# Patient Record
Sex: FEMALE | Race: WHITE | NOT HISPANIC OR LATINO | Employment: FULL TIME | URBAN - METROPOLITAN AREA
[De-identification: names, ages, dates, MRNs, and addresses within clinical notes are randomized per-mention and may not be internally consistent; named-entity substitution may affect disease eponyms.]

---

## 2017-01-09 ENCOUNTER — ALLSCRIPTS OFFICE VISIT (OUTPATIENT)
Dept: OTHER | Facility: OTHER | Age: 27
End: 2017-01-09

## 2017-02-01 ENCOUNTER — ALLSCRIPTS OFFICE VISIT (OUTPATIENT)
Dept: OTHER | Facility: OTHER | Age: 27
End: 2017-02-01

## 2017-06-28 ENCOUNTER — HOSPITAL ENCOUNTER (EMERGENCY)
Facility: HOSPITAL | Age: 27
Discharge: HOME | End: 2017-06-28
Attending: EMERGENCY MEDICINE | Admitting: EMERGENCY MEDICINE
Payer: COMMERCIAL

## 2017-06-28 VITALS
DIASTOLIC BLOOD PRESSURE: 83 MMHG | BODY MASS INDEX: 24.81 KG/M2 | RESPIRATION RATE: 18 BRPM | OXYGEN SATURATION: 100 % | WEIGHT: 168 LBS | SYSTOLIC BLOOD PRESSURE: 133 MMHG | TEMPERATURE: 97.6 F | HEART RATE: 99 BPM

## 2017-06-28 DIAGNOSIS — M54.9 BACK PAIN: Primary | ICD-10-CM

## 2017-06-28 PROCEDURE — 96372 THER/PROPH/DIAG INJ SC/IM: CPT

## 2017-06-28 PROCEDURE — 99283 EMERGENCY DEPT VISIT LOW MDM: CPT

## 2017-06-28 RX ORDER — DIAZEPAM 5 MG/1
5 TABLET ORAL EVERY 8 HOURS PRN
Qty: 20 TABLET | Refills: 0 | Status: SHIPPED | OUTPATIENT
Start: 2017-06-28 | End: 2018-01-30

## 2017-06-28 RX ORDER — OXYCODONE HYDROCHLORIDE AND ACETAMINOPHEN 5; 325 MG/1; MG/1
1 TABLET ORAL EVERY 6 HOURS PRN
Qty: 28 TABLET | Refills: 0 | Status: SHIPPED | OUTPATIENT
Start: 2017-06-28 | End: 2017-07-08

## 2017-06-28 RX ORDER — METHYLPREDNISOLONE 4 MG/1
TABLET ORAL
Qty: 21 TABLET | Refills: 0 | Status: SHIPPED | OUTPATIENT
Start: 2017-06-28 | End: 2018-01-30

## 2017-06-28 RX ORDER — DEXAMETHASONE SODIUM PHOSPHATE 4 MG/ML
4 INJECTION, SOLUTION INTRA-ARTICULAR; INTRALESIONAL; INTRAMUSCULAR; INTRAVENOUS; SOFT TISSUE ONCE
Status: COMPLETED | OUTPATIENT
Start: 2017-06-28 | End: 2017-06-28

## 2017-06-28 RX ORDER — DIAZEPAM 5 MG/ML
5 INJECTION, SOLUTION INTRAMUSCULAR; INTRAVENOUS ONCE
Status: COMPLETED | OUTPATIENT
Start: 2017-06-28 | End: 2017-06-28

## 2017-06-28 RX ADMIN — DEXAMETHASONE SODIUM PHOSPHATE 4 MG: 4 INJECTION, SOLUTION INTRAMUSCULAR; INTRAVENOUS at 14:53

## 2017-06-28 RX ADMIN — HYDROMORPHONE HYDROCHLORIDE 1 MG: 1 INJECTION, SOLUTION INTRAMUSCULAR; INTRAVENOUS; SUBCUTANEOUS at 14:54

## 2017-06-28 RX ADMIN — DIAZEPAM 5 MG: 5 INJECTION, SOLUTION INTRAMUSCULAR; INTRAVENOUS at 15:00

## 2017-07-25 ENCOUNTER — TRANSCRIBE ORDERS (OUTPATIENT)
Dept: ADMINISTRATIVE | Facility: HOSPITAL | Age: 27
End: 2017-07-25

## 2017-07-25 ENCOUNTER — ALLSCRIPTS OFFICE VISIT (OUTPATIENT)
Dept: OTHER | Facility: OTHER | Age: 27
End: 2017-07-25

## 2017-07-25 DIAGNOSIS — M54.16 LUMBAR RADICULOPATHY: Primary | ICD-10-CM

## 2017-07-25 DIAGNOSIS — Z98.890 OTHER SPECIFIED POSTPROCEDURAL STATES: ICD-10-CM

## 2017-07-25 DIAGNOSIS — M54.41 LOW BACK PAIN WITH RIGHT-SIDED SCIATICA: ICD-10-CM

## 2017-07-25 DIAGNOSIS — M54.16 RADICULOPATHY OF LUMBAR REGION: ICD-10-CM

## 2017-08-08 ENCOUNTER — HOSPITAL ENCOUNTER (OUTPATIENT)
Dept: MRI IMAGING | Facility: HOSPITAL | Age: 27
Discharge: HOME | End: 2017-08-08
Payer: COMMERCIAL

## 2017-08-08 DIAGNOSIS — M54.16 LUMBAR RADICULOPATHY: ICD-10-CM

## 2017-08-08 PROCEDURE — A9585 GADOBUTROL INJECTION: HCPCS | Performed by: RADIOLOGY

## 2017-08-08 PROCEDURE — 72158 MRI LUMBAR SPINE W/O & W/DYE: CPT

## 2017-08-08 RX ADMIN — GADOBUTROL 7 ML: 604.72 INJECTION INTRAVENOUS at 21:07

## 2017-08-25 ENCOUNTER — ALLSCRIPTS OFFICE VISIT (OUTPATIENT)
Dept: OTHER | Facility: OTHER | Age: 27
End: 2017-08-25

## 2017-08-25 DIAGNOSIS — M54.16 RADICULOPATHY OF LUMBAR REGION: ICD-10-CM

## 2017-08-25 DIAGNOSIS — Z00.00 ENCOUNTER FOR GENERAL ADULT MEDICAL EXAMINATION WITHOUT ABNORMAL FINDINGS: ICD-10-CM

## 2017-09-06 ENCOUNTER — HOSPITAL ENCOUNTER (OUTPATIENT)
Dept: RADIOLOGY | Facility: HOSPITAL | Age: 27
Discharge: HOME | End: 2017-09-06
Attending: RADIOLOGY | Admitting: RADIOLOGY
Payer: COMMERCIAL

## 2017-09-06 VITALS — SYSTOLIC BLOOD PRESSURE: 124 MMHG | OXYGEN SATURATION: 100 % | DIASTOLIC BLOOD PRESSURE: 82 MMHG | HEART RATE: 80 BPM

## 2017-09-06 DIAGNOSIS — M54.16 LUMBAR RADICULOPATHY: ICD-10-CM

## 2017-09-06 PROCEDURE — 64483 NJX AA&/STRD TFRM EPI L/S 1: CPT

## 2017-09-06 RX ORDER — METHYLPREDNISOLONE ACETATE 80 MG/ML
80 INJECTION, SUSPENSION INTRA-ARTICULAR; INTRALESIONAL; INTRAMUSCULAR; SOFT TISSUE ONCE
Status: COMPLETED | OUTPATIENT
Start: 2017-09-06 | End: 2017-09-06

## 2017-09-06 RX ORDER — BUPIVACAINE HYDROCHLORIDE 2.5 MG/ML
20 INJECTION, SOLUTION EPIDURAL; INFILTRATION; INTRACAUDAL ONCE
Status: COMPLETED | OUTPATIENT
Start: 2017-09-06 | End: 2017-09-06

## 2017-09-06 RX ADMIN — METHYLPREDNISOLONE ACETATE 80 MG: 80 INJECTION, SUSPENSION INTRA-ARTICULAR; INTRALESIONAL; INTRAMUSCULAR; SOFT TISSUE at 13:26

## 2017-09-06 RX ADMIN — IOHEXOL 4 ML: 300 INJECTION, SOLUTION INTRAVENOUS at 13:30

## 2017-09-06 RX ADMIN — BUPIVACAINE HYDROCHLORIDE 1 ML: 2.5 INJECTION, SOLUTION EPIDURAL; INFILTRATION; INTRACAUDAL at 13:28

## 2017-09-07 ENCOUNTER — GENERIC CONVERSION - ENCOUNTER (OUTPATIENT)
Dept: OTHER | Facility: OTHER | Age: 27
End: 2017-09-07

## 2018-01-12 ENCOUNTER — GENERIC CONVERSION - ENCOUNTER (OUTPATIENT)
Dept: OTHER | Facility: OTHER | Age: 28
End: 2018-01-12

## 2018-01-12 ENCOUNTER — ALLSCRIPTS OFFICE VISIT (OUTPATIENT)
Dept: OTHER | Facility: OTHER | Age: 28
End: 2018-01-12

## 2018-01-12 VITALS
SYSTOLIC BLOOD PRESSURE: 116 MMHG | DIASTOLIC BLOOD PRESSURE: 70 MMHG | WEIGHT: 170 LBS | BODY MASS INDEX: 25.18 KG/M2 | HEIGHT: 69 IN

## 2018-01-12 VITALS
DIASTOLIC BLOOD PRESSURE: 85 MMHG | WEIGHT: 176 LBS | HEART RATE: 105 BPM | TEMPERATURE: 99 F | RESPIRATION RATE: 16 BRPM | SYSTOLIC BLOOD PRESSURE: 152 MMHG | HEIGHT: 69 IN | BODY MASS INDEX: 26.07 KG/M2

## 2018-01-12 DIAGNOSIS — R53.83 OTHER FATIGUE: ICD-10-CM

## 2018-01-12 DIAGNOSIS — M25.549 ARTHRALGIA OF HAND: ICD-10-CM

## 2018-01-12 NOTE — RESULT NOTES
Verified Results  IR OTHER [id number] 11:55AM [facility]     Test Name Result Flag Reference   IR OTHER (Report)     Procedure: Right L5-S1 transforaminal epidural steroid injection  OPERATORS: Ania  Medications:   2 0 ml Omnipaque 300   1 0 mL 0 25% Bupivicaine    1 mL depo-medrol 80 mg/mL     Fluoroscopy time: 3 2 minutes     Complications: None  Indication: Right lower extremity radiculopathy  Procedure: The site was marked by the attending physician  Next, after standard time out procedure, the skin overlying the target was prepped and draped in the usual sterile technique  Using a 3 5-in 22-gauge needle, with fluoroscopic guidance, the right L5-S1    neural foramen was targeted and advanced to the appropriate landmarks  Contrast was injected under subtraction fluoroscopy  There was contrast opacification of the nerve root sleeve and epidural space  No vascular enhancement demonstrated  1 ml of    bupivacaine was injected through the needle, followed by 1 0 ml of depo-medrol  The needle was removed  The patient tolerated the procedure well without complications  IMPRESSION:   Impression:    Successful right L5-S1 transforaminal epidural steroid injection         Workstation performed: [id number]     Signed by:   Khadra Diaz MD   9/6/17

## 2018-01-13 NOTE — CONSULTS
Assessment    1  Chronic right-sided low back pain with right-sided sciatica (724 2,724 3,338 29)   (M54 41,G89 29)   2  Lumbar radicular pain (724 4) (M54 16)   3  History of lumbar surgery (V15 29) (Z98 890)    Plan   Chronic right-sided low back pain with right-sided sciatica, History of lumbar surgery,  Lumbar radicular pain    · Follow Up After Tests Complete Evaluation and Treatment  Follow-up, after MRI lumbar  spine with and without, Dr Lilliam Flood, [facility])  Status: Hold For - Scheduling   Requested for: [id number]   Ordered; For: Chronic right-sided low back pain with right-sided sciatica, History of lumbar surgery, Lumbar radicular pain; Ordered By: Roman Eugene Performed:  Due: 03BQH1360   · * MRI LUMBAR SPINE W WO CONTRAST; Status:Need Information - Financial  Authorization; Requested for:[id number];    Perform:[facility] Radiology; Due:85Ykv5078; Last Updated By:Delmy Michaels; 7/25/2017 10:11:01 AM;Ordered; For:Chronic right-sided low back pain with right-sided sciatica, History of lumbar surgery, Lumbar radicular pain; Ordered By:Billy Cabrera;    1 Amina Bahena MD, Erik CAMPOS (Pain Management) Co-Management  Consult and treat  Status: Active  Requested for: [id number]  Ordered; For: Chronic right-sided low back pain with right-sided sciatica, History of lumbar surgery, Lumbar radicular pain;  Ordered By: Roman Eugene  Performed:   Due: 98NPU1527     Discussion/Summary    Very pleasant 29-year-old female, accompanied by her mother, presents for evaluation of right low back, right leg, and right foot pain  There is no recent imaging for review  Patient has had a recent approximate 3 month course of chiropractic care with Dr Jimmye Merlin in the [facility] area with no significant improvement  MRI lumbar spine with and without contrast is requested to assess for the etiology of her chronic pain, and clinical follow-up  Consultation with pain management is also requested with Dr Tolu Contreras also understands should she have significant increase/persistent pain, difficulty with gait or balance, motor or sensory difficulties in the lower extremities, or bowel or bladder incontinence recent call and return sooner for reassessment  These findings, impressions and recommendations are reviewed in great detail with the patient, she and her mother, expressed understanding and agreement, their questions were answered completely and to their satisfaction  Follow up has been scheduled  The patient has the current Goals: Improvement/resolution chronic right low back, let right leg, and foot pain  Patient is able to Self-Care  Chief Complaint    1  Back Pain  Initial consultation, referral by ER, right low back pain, right leg pain, right foot pain      History of Present Illness  Very pleasant 51-year-old female, accompanied by her mother, presents as noted above  She reports a history of sudden onset of right low back, right leg, in radiation to the right foot pain on 6/28/17, while playing golf, she reported she had ball out of a bunker and had sudden onset of this pain  She was seen at [place] [address] emergency department 7/28/17, treated with a dexamethasone injection, [place] injection, and diazepam  She was discharged with methylprednisolone pack (Medrol Dosepak), diazepam as well as Percocet  She has history of surgery for a "disc herniation" in [place] in 2012, this was while she was a student at the [employer], [place], [place], and was also associated with a golfing injury  (records have been requested from the [employer] athletic department)    She reports following the surgery she had a 4-6 month physical therapy rehabilitation program prior to returning to golf  She reports she continues to be occasionally symptomatic since the event, particularly with chronic right sciatic, associated with twisting while golfing      She reports she did see her chiropractor in the [facility] area Dr Td Sanchez and receive therapy for 4-6 weeks prior to this injury in and attempt to settle things down with her low back, with no significant improvement  Celester Letha presents with complaints of sudden onset of intermittent episodes of severe bilateral lower back pain, radiating to the right buttock, right thigh, right lower leg and right foot  On a scale of 1 to 10, the patient rates the pain as 9  The symptoms resulted from a direct blow  Symptoms are improved by rest and NSAIDs, but not by ice and heat Symptoms are made worse by standing and twisting (plays golf in college, noticed when playing golf, 2011, after an impact in a golf swing, hasn't been golfing in past month)   Associated symptoms include spasm, stiffness, general malaise and leg numbness, but no fever, no night sweats, no abdominal pain, no weight loss, no arm numbness, no arm weakness, no leg weakness, no urinary incontinence, no fecal incontinence, no urinary retention and no rash localized to the area of pain  Review of Systems    Constitutional: feeling poorly and feeling tired  Eyes: No complaints of eye pain, no red eyes, no eyesight problems, no discharge, no dry eyes, no itching of eyes  ENT: no complaints of earache, no loss of hearing, no nose bleeds, no nasal discharge, no sore throat, no hoarseness  Cardiovascular: No complaints of slow heart rate, no fast heart rate, no chest pain, no palpitations, no leg claudication, no lower extremity edema  Respiratory: No complaints of shortness of breath, no wheezing, no cough, no SOB on exertion, no orthopnea, no PND  Gastrointestinal: No complaints of abdominal pain, no constipation, no nausea or vomiting, no diarrhea, no bloody stools  Genitourinary: No complaints of dysuria, no incontinence, no pelvic pain, no dysmenorrhea, no vaginal discharge or bleeding     Musculoskeletal: No complaints of arthralgias, no myalgias, no joint swelling or stiffness, no limb pain or swelling  Integumentary: No complaints of skin rash or lesions, no itching, no skin wounds, no breast pain or lump  Neurological: numbness, tingling, difficulty walking and tingling just back side of right leg  Psychiatric: depression and back pain keeps from doing normal activities, but no sleep disturbances  Endocrine: No complaints of proptosis, no hot flashes, no muscle weakness, no deepening of the voice, no feelings of weakness  Hematologic/Lymphatic: No complaints of swollen glands, no swollen glands in the neck, does not bleed easily, does not bruise easily  ROS reviewed  Active Problems    1  Abdominal discomfort, bilateral lower quadrant (789 03,789 04) (R10 31,R10 32)   2  Anxiety (300 00) (F41 9)   3  Arthralgia of hand, unspecified laterality (719 44) (M25 549)   4  Celiac disease (579 0) (K90 0)   5  Change in bowel habits (787 99) (R19 4)   6  Cold intolerance (780 99) (R68 89)   7  Constipation, unspecified constipation type (564 00) (K59 00)   8  Depression (311) (F32 9)   9  Fatigue (780 79) (R53 83)   10  OCD (obsessive compulsive disorder) (300 3) (F42 9)   11  Other constipation (564 09) (K59 09)   12  Other fatigue (780 79) (R53 83)   13  Weight gain (783 1) (R63 5)    Past Medical History    1  History of Abdominal bloating (787 3) (R14 0)   2  History of Abdominal discomfort, bilateral lower quadrant (789 03,789 04)   (R10 31,R10 32)   3  History of Abnormal weight gain (783 1) (R63 5)   4  History of Acute frontal sinusitis (461 1) (J01 10)   5  History of Anxiety (300 00) (F41 9)   6  History of acute pharyngitis (V12 69) (Z87 09)    The active problems and past medical history were reviewed and updated today  Surgical History    1  History of Back Surgery    The surgical history was reviewed and updated today  Family History  Father    1  Family history of diabetes mellitus (V18 0) (Z83 3)   2   Family history of hypertension (V17 49) (Z82 49)    The family history was reviewed and updated today  Social History    · Never a smoker   · Occasional alcohol use  The social history was reviewed and updated today  Current Meds   1  DULoxetine HCl - 60 MG Oral Capsule Delayed Release Particles; take 1 capsule daily; Therapy: [id number] to (Lucía Atkinson)  Requested for: 85Ulh2804; Last   Rx:[id number] Ordered   2  Seasonique 0 15-0 03 &0 01 MG Oral Tablet; Therapy: (Recorded:16Tgv2226) to Recorded    The medication list was reviewed and updated today  Allergies    1  Amoxicillin TABS    2  Gluten    Vitals  Vital Signs    Recorded: 25Jul2017 09:25AM   Temperature 98 3 F, Tympanic   Heart Rate 96, L Radial   Respiration 16   Systolic 281, LUE, Sitting   Diastolic 80, LUE, Sitting   Height 5 ft 8 5 in   Weight 168 lb 9 6 oz   BMI Calculated 25 26   BSA Calculated 1 91   Pain Scale 0     Physical Exam     Constitutional Patient appears healthy and well developed  No signs of acute distress present  appears healthy, comfortable, within normal limits of ideal weight and appearance reflects stated age  Respiratory Respiratory effort: Normal   Auscultation of lungs: Clear to auscultation bilaterally  Cardiovascular Auscultation of heart: Rate is regular  Rhythm is regular  No heart murmur appreciated  Musculo: Spine   Spine:    Lumbar/Sacral Spine examination demonstrates Lumbosacral Spine: Appearance: Normal  Tenderness: right sciatic notch, but not the lumbar spine, not the left paraspinal and not the right paraspinal  Palpatory Findings include no bilateral muscle spasms  ROM: Full  Motor: Normal    Motor Exam: all motor groups within normal limits of strength & tone bilaterally  Sensory Exam: all sensory within normal limits bilaterally  Deep Tendon Reflexes: all reflexes within normal limits bilaterally  Neurologic - Mental Status: Alert and Oriented x3    Mood and affect: Affect is normal  Grossly nonfocal    Motor System General Motor Strength: No pronator drift and no parietal drift  Motor System - Upper Extremities: Normal to inspection and palpation  Strength: Deltoids 5/5 bilaterally  Biceps 5/5 bilaterally  Triceps 5/5 bilaterally  Extensor carpi radials is 5/5 bilaterally  Extensor digitorum 5/5 bilaterally  Intrinsic 5/5 bilaterally   5/5 bilaterally  Muscle tone: Normal bilaterally  Muscle Bulk: Normal bilaterally  Motor System - Lower Extremities: Normal to inspection and palpation  Strength: iliopsoas 5/5 bilaterally  Quadriceps 5/5 bilaterally  Hamstrings 5/5 bilaterally  Gastrocnemius 5/5 bilaterally  Muscle tone: Normal bilaterally  Muscle Bulk: Normal bilaterally  Reflexes: Biceps reflexes are 2+ bilaterally  Triceps reflexes are 2+ bilaterally  Achilles reflexes are 2+ bilaterally  Babinski's reflex is 2+ down going bilaterally  Ankle clonus is absent bilaterally  Coordination: Finger to nose was normal    Sensory: Sensation grossly intact to light touch  Sensation grossly intact to light touch  Gait and Station: Jennings with a normal gait  Future Appointments    Date/Time Provider Specialty Site   08/15/2017 08:30 AM Juventino Cabrera, [facility] Neurosurgery [facility] NEUR[facility]   08/15/2017 08:45 AM MARY JANE Sosa   Neurosurgery [facility]     Signatures   Electronically signed by : Kam Hobbs [facility]; Jul 25 2017 10:54AM EST                       (Author)    Electronically signed by : MARY JANE Martin ; Jul 25 2017  5:00PM EST

## 2018-01-13 NOTE — RESULT NOTES
Verified Results  (Q) TSH, 3RD GENERATION [id number] 07:38AM Deshawn Contreras     Test Name Result Flag Reference   TSH 1 41 mIU/L     Reference Range                         > or = 20 Years  0 40-4 50                              Pregnancy Ranges            First trimester    0 26-2 66            Second trimester   0 55-2 73            Third trimester    0 43-2 91     (Q) IDA BARR VIRUS ANTIBODY PANEL [id number] 07:38AM Deshawn Contreras     Test Name Result Flag Reference   IDA BAEZA VIRUS VCA$ANTIBODY (IGM) < OR = 0 90     Value         Interpretation                             -----         --------------                             < or = 0 90   Negative                             0  91-1 09     Equivocal                             > or = 1 10   Positive   IDA BAEZA VIRUS VCA$ANTIBODY (IGG) 3 20 H    Value         Interpretation                             -----         --------------                             < or = 0 90   Negative                             0  91-1 09     Equivocal                             > or = 1 10   Positive   IDA BARR VIRUS (EBNA)$ANTIBODY (IGG) 3 78 H    Value         Interpretation                             -----         --------------                             < or = 0 90   Negative                             0  91-1 09     Equivocal                             > or = 1 10   Positive   INTERPRETATION:      Suggestive of a past Ida-Barr virus infection  In infants, a similar pattern may occur as a result  of passive maternal transfer of antibody       (Q) LYME DISEASE ANTIBODIES (IGG, IGM) WESTERN BLOT [id number] 07:38AM Deshawn Contreras     Test Name Result Flag Reference   LYME DISEASE AB (IGG) WB NEGATIVE  NEGATIVE   18 KD (IGG) BAND NON-REACTIVE     23 KD (IGG) BAND NON-REACTIVE     28 KD (IGG) BAND NON-REACTIVE     30 KD (IGG) BAND NON-REACTIVE     39 KD (IGG) BAND NON-REACTIVE     23 KD (IGM) BAND NON-REACTIVE     39 KD (IGM) BAND NON-REACTIVE     41 KD (IGM) BAND NON-REACTIVE     As per CDC criteria, a Lyme disease IgG Immunoblot must  show reactivity to at least 5 of 10 specific borrelial  proteins to be considered positive; similarly, a   positive Lyme disease IgM immunoblot requires  reactivity to 2 of 3 specific borrelial proteins  Although considered negative, IgG reactivity to fewer  specific borrelial proteins or IgM reactivity to only  1 protein may indicate recent B  burgdorferi infection  and warrant testing of a later sample  A positive IgM  but negative IgG result obtained more than a month  after onset of symptoms likely represents a false-  positive IgM result rather than acute Lyme disease  In rare instances, Lyme disease immunoblot reactivity  may represent antibodies induced by exposure to other  spirochetes  41 KD (IGG) BAND REACTIVE A    45 KD (IGG) BAND NON-REACTIVE     58 KD (IGG) BAND REACTIVE A    66 KD (IGG) BAND NON-REACTIVE     93 KD (IGG) BAND NON-REACTIVE     LYME DISEASE AB (IGM) WB NEGATIVE  NEGATIVE     *(Q) VITAMIN D, 25-HYDROXY, LC/MS/MS [id number] 07:38AM CordManfred mora   REPORT COMMENT:  FASTING:YES     Test Name Result Flag Reference   VITAMIN D, 25-OH, TOTAL 27 ng/mL L    Vitamin D Status         25-OH Vitamin D:     Deficiency:                    <20 ng/mL  Insufficiency:             20 - 29 ng/mL  Optimal:                 > or = 30 ng/mL     For 25-OH Vitamin D testing on patients on   D2-supplementation and patients for whom quantitation   of D2 and D3 fractions is required, the QuestAssureD(TM)  25-OH VIT D, (D2,D3), LC/MS/MS is recommended: order   code 53506 (patients >2yrs)  For more information on this test, go to:  http://Planana/faq/TWL700  (This link is being provided for   informational/educational purposes only )     (Q) VITAMIN B12/FOLATE, SERUM PANEL [id number] 07:38AM Cordisco, [place]     Test Name Result Flag Reference   VITAMIN B12 359 pg/mL  200-1100   Please Note: Although the reference range for vitamin  B12 is 200-1100 pg/mL, it has been reported that between  5 and 10% of patients with values between 200 and 400  pg/mL may experience neuropsychiatric and hematologic  abnormalities due to occult B12 deficiency; less than 1%  of patients with values above 400 pg/mL will have symptoms  FOLATE, SERUM 14 7 ng/mL     Reference Range                             Low:           <3 4                             Borderline:    3 4-5 4                             Normal:        >5 4     (1) COMPREHENSIVE METABOLIC PANEL [id number] 33:31NW [facility]     Test Name Result Flag Reference   GLUCOSE 89 mg/dL  65-99   Fasting reference interval   UREA NITROGEN (BUN) 15 mg/dL  7-25   CREATININE 0 78 mg/dL  0 50-1 10   eGFR NON-AFR   AMERICAN 106 mL/min/1 73m2  > OR = 60   eGFR AFRICAN AMERICAN 123 mL/min/1 73m2  > OR = 60   BUN/CREATININE RATIO   8-52   NOT APPLICABLE (calc)   ALT 11 U/L  6-29   ALBUMIN 4 6 g/dL  3 6-5 1   GLOBULIN 2 3 g/dL (calc)  1 9-3 7   ALBUMIN/GLOBULIN RATIO 2 0 (calc)  1 0-2 5   BILIRUBIN, TOTAL 0 3 mg/dL  0 2-1 2   ALKALINE PHOSPHATASE 37 U/L     AST 15 U/L  10-30   SODIUM 141 mmol/L  135-146   POTASSIUM 4 8 mmol/L  3 5-5 3   CHLORIDE 108 mmol/L     CARBON DIOXIDE 23 mmol/L  20-31   CALCIUM 9 5 mg/dL  8 6-10 2   PROTEIN, TOTAL 6 9 g/dL  6 1-8 1     (1) HEPATIC FUNCTION PANEL [id number] 07:38AM [facility]     Test Name Result Flag Reference   PROTEIN, TOTAL 6 9 g/dL  6 1-8 1   ALBUMIN 4 6 g/dL  3 6-5 1   GLOBULIN 2 3 g/dL (calc)  1 9-3 7   ALBUMIN/GLOBULIN RATIO 2 0 (calc)  1 0-2 5   BILIRUBIN, TOTAL 0 3 mg/dL  0 2-1 2   BILIRUBIN, DIRECT 0 1 mg/dL  < OR = 0 2   BILIRUBIN, INDIRECT 0 2 mg/dL (calc)  0 2-1 2   ALKALINE PHOSPHATASE 37 U/L     AST 15 U/L  10-30   ALT 11 U/L  6-29     (1) CBC/PLT/DIFF [id number] 07:38AM [facility]     Test Name Result Flag Reference   WHITE BLOOD CELL COUNT 4 6 Thousand/uL  3 8-10 8   RED BLOOD CELL COUNT 4 34 Million/uL 3  80-5 10   HEMOGLOBIN 13 6 g/dL  11 7-15 5   HEMATOCRIT 41 3 %  35 0-45 0   MCV 95 2 fL  80 0-100 0   MCH 31 4 pg  27 0-33 0   EOSINOPHILS 1 7 %     BASOPHILS 0 6 %     ABSOLUTE MONOCYTES 267 cells/uL  200-950   ABSOLUTE EOSINOPHILS 78 cells/uL     ABSOLUTE BASOPHILS 28 cells/uL  0-200   NEUTROPHILS 55 0 %     LYMPHOCYTES 36 9 %     MONOCYTES 5 8 %     MCHC 33 0 g/dL  32 0-36 0   RDW 12 9 %  11 0-15 0   PLATELET COUNT 650 Thousand/uL  140-400   MPV 9 2 fL  7 5-11 5   ABSOLUTE NEUTROPHILS 2530 cells/uL  9353-0976   ABSOLUTE LYMPHOCYTES 1697 cells/uL  850-3900     (1) OP RICHARD FERRITIN [id number] 07:38AM Cordisco, Lethea Herter     Test Name Result Flag Reference   FERRITIN 19 ng/mL

## 2018-01-14 VITALS
DIASTOLIC BLOOD PRESSURE: 80 MMHG | SYSTOLIC BLOOD PRESSURE: 122 MMHG | HEART RATE: 96 BPM | RESPIRATION RATE: 16 BRPM | WEIGHT: 168.6 LBS | HEIGHT: 69 IN | BODY MASS INDEX: 24.97 KG/M2 | TEMPERATURE: 98.3 F

## 2018-01-14 VITALS
WEIGHT: 171.25 LBS | SYSTOLIC BLOOD PRESSURE: 132 MMHG | OXYGEN SATURATION: 99 % | HEIGHT: 69 IN | DIASTOLIC BLOOD PRESSURE: 74 MMHG | HEART RATE: 84 BPM | RESPIRATION RATE: 16 BRPM | TEMPERATURE: 97.7 F | BODY MASS INDEX: 25.36 KG/M2

## 2018-01-16 ENCOUNTER — LAB CONVERSION - ENCOUNTER (OUTPATIENT)
Dept: OTHER | Facility: OTHER | Age: 28
End: 2018-01-16

## 2018-01-16 LAB
A/G RATIO (HISTORICAL): 2.2 (CALC) (ref 1–2.5)
ALBUMIN SERPL BCP-MCNC: 5 G/DL (ref 3.6–5.1)
ALP SERPL-CCNC: 39 U/L (ref 33–115)
ALT SERPL W P-5'-P-CCNC: 14 U/L (ref 6–29)
ANTI DNA DOUBLE STRANDED (HISTORICAL): <1 IU/ML
ANTI-NUCLEAR ANTIBODY (ANA) (HISTORICAL): NEGATIVE
AST SERPL W P-5'-P-CCNC: 19 U/L (ref 10–30)
BASOPHILS # BLD AUTO: 1.2 %
BASOPHILS # BLD AUTO: 52 CELLS/UL (ref 0–200)
BILIRUB SERPL-MCNC: 0.5 MG/DL (ref 0.2–1.2)
BUN SERPL-MCNC: 14 MG/DL (ref 7–25)
BUN/CREA RATIO (HISTORICAL): NORMAL (CALC) (ref 6–22)
CALCIUM SERPL-MCNC: 9.9 MG/DL (ref 8.6–10.2)
CHLORIDE SERPL-SCNC: 104 MMOL/L (ref 98–110)
CHOLEST SERPL-MCNC: 175 MG/DL
CHOLEST/HDLC SERPL: 3.2 (CALC)
CO2 SERPL-SCNC: 26 MMOL/L (ref 20–31)
CREAT SERPL-MCNC: 0.93 MG/DL (ref 0.5–1.1)
DEPRECATED RDW RBC AUTO: 11.9 % (ref 11–15)
EGFR AFRICAN AMERICAN (HISTORICAL): 99 ML/MIN/1.73M2
EGFR-AMERICAN CALC (HISTORICAL): 85 ML/MIN/1.73M2
EOSINOPHIL # BLD AUTO: 1.4 %
EOSINOPHIL # BLD AUTO: 60 CELLS/UL (ref 15–500)
ERYTHROCYTE SEDIMENTATION RATE (HISTORICAL): 6 MM/H
GAMMA GLOBULIN (HISTORICAL): 2.3 G/DL (CALC) (ref 1.9–3.7)
GLUCOSE (HISTORICAL): 87 MG/DL (ref 65–99)
HCT VFR BLD AUTO: 40.9 % (ref 35–45)
HDLC SERPL-MCNC: 54 MG/DL
HGB BLD-MCNC: 13.8 G/DL (ref 11.7–15.5)
LDL CHOLESTEROL (HISTORICAL): 102 MG/DL (CALC)
LYMPHOCYTES # BLD AUTO: 1630 CELLS/UL (ref 850–3900)
LYMPHOCYTES # BLD AUTO: 37.9 %
MCH RBC QN AUTO: 31.6 PG (ref 27–33)
MCHC RBC AUTO-ENTMCNC: 33.7 G/DL (ref 32–36)
MCV RBC AUTO: 93.6 FL (ref 80–100)
MONOCYTES # BLD AUTO: 314 CELLS/UL (ref 200–950)
MONOCYTES (HISTORICAL): 7.3 %
NEUTROPHILS # BLD AUTO: 2245 CELLS/UL (ref 1500–7800)
NEUTROPHILS # BLD AUTO: 52.2 %
NON-HDL-CHOL (CHOL-HDL) (HISTORICAL): 121 MG/DL (CALC)
PLATELET # BLD AUTO: 350 THOUSAND/UL (ref 140–400)
PMV BLD AUTO: 10.6 FL (ref 7.5–12.5)
POTASSIUM SERPL-SCNC: 4.4 MMOL/L (ref 3.5–5.3)
RBC # BLD AUTO: 4.37 MILLION/UL (ref 3.8–5.1)
RHEUMATOID FACTOR (HISTORICAL): <14 IU/ML
SODIUM SERPL-SCNC: 139 MMOL/L (ref 135–146)
T4 FREE SERPL-MCNC: 1 NG/DL (ref 0.8–1.8)
TOTAL PROTEIN (HISTORICAL): 7.3 G/DL (ref 6.1–8.1)
TRIGL SERPL-MCNC: 98 MG/DL
TSH SERPL DL<=0.05 MIU/L-ACNC: 1.09 MIU/L
WBC # BLD AUTO: 4.3 THOUSAND/UL (ref 3.8–10.8)

## 2018-01-16 NOTE — RESULT NOTES
Verified Results  (1) CBC/PLT/DIFF [id number] 08:30AM Molly Contreras     Test Name Result Flag Reference   WHITE BLOOD CELL COUNT 4 8 Thousand/uL  3 8-10 8   RED BLOOD CELL COUNT 4 09 Million/uL  3 80-5 10   HEMOGLOBIN 12 7 g/dL  11 7-15 5   HEMATOCRIT 39 0 %  35 0-45 0   MCV 95 2 fL  80 0-100 0   MCH 31 1 pg  27 0-33 0   MCHC 32 7 g/dL  32 0-36 0   RDW 13 2 %  11 0-15 0   PLATELET COUNT 875 Thousand/uL  140-400   MPV 9 3 fL  7 5-11 5   ABSOLUTE NEUTROPHILS 2952 cells/uL  6804-7307   ABSOLUTE LYMPHOCYTES 1450 cells/uL  850-3900   ABSOLUTE MONOCYTES 307 cells/uL  200-950   ABSOLUTE EOSINOPHILS 77 cells/uL     ABSOLUTE BASOPHILS 14 cells/uL  0-200   NEUTROPHILS 61 5 %     LYMPHOCYTES 30 2 %     MONOCYTES 6 4 %     EOSINOPHILS 1 6 %     BASOPHILS 0 3 %     WE RECEIVED YOUR HANDWRITTEN TEST ORDER AND  PERFORMED A CBC (INCLUDES HEMOGRAM, PLATELET  AND DIFFERENTIAL)  IF THIS IS NOT WHAT YOU  INTENDED TO ORDER, PLEASE CONTACT YOUR LOCAL   IMMEDIATELY SO   THAT WE CAN ADJUST OUR BILLING APPROPRIATELY  YOU MAY ALSO INQUIRE ABOUT ALTERNATIVE OR   ADDITIONAL TESTING  (1) COMPREHENSIVE METABOLIC PANEL [id number] 80:66BA Molly Contreras     Test Name Result Flag Reference   GLUCOSE 84 mg/dL  65-99   Fasting reference interval   UREA NITROGEN (BUN) 8 mg/dL  7-25   CREATININE 0 85 mg/dL  0 50-1 10   eGFR NON-AFR   AMERICAN 96 mL/min/1 73m2  > OR = 60   eGFR AFRICAN AMERICAN 111 mL/min/1 73m2  > OR = 60   BUN/CREATININE RATIO   4-97   NOT APPLICABLE (calc)   SODIUM 139 mmol/L  135-146   POTASSIUM 4 2 mmol/L  3 5-5 3   CHLORIDE 106 mmol/L     CARBON DIOXIDE 23 mmol/L  19-30   CALCIUM 9 1 mg/dL  8 6-10 2   PROTEIN, TOTAL 6 7 g/dL  6 1-8 1   ALBUMIN 4 4 g/dL  3 6-5 1   GLOBULIN 2 3 g/dL (calc)  1 9-3 7   ALBUMIN/GLOBULIN RATIO 1 9 (calc)  1 0-2 5   BILIRUBIN, TOTAL 0 4 mg/dL  0 2-1 2   ALKALINE PHOSPHATASE 33 U/L     AST 16 U/L  10-30   ALT 13 U/L  6-29     (Q) TSH, 3RD GENERATION [id number] 08:30AM Alma Rosa Contreras   REPORT COMMENT:  FASTING:YES     Test Name Result Flag Reference   TSH 0 59 mIU/L     Reference Range                         > or = 20 Years  0 40-4 50                              Pregnancy Ranges            First trimester    0 26-2 66            Second trimester   0 55-2 73            Third trimester    0 43-2 91

## 2018-01-24 VITALS
OXYGEN SATURATION: 98 % | HEART RATE: 90 BPM | DIASTOLIC BLOOD PRESSURE: 90 MMHG | SYSTOLIC BLOOD PRESSURE: 124 MMHG | WEIGHT: 176 LBS | BODY MASS INDEX: 26.07 KG/M2 | HEIGHT: 69 IN | RESPIRATION RATE: 16 BRPM | TEMPERATURE: 98.1 F

## 2018-01-29 ENCOUNTER — TELEPHONE (OUTPATIENT)
Dept: GASTROENTEROLOGY | Facility: CLINIC | Age: 28
End: 2018-01-29

## 2018-01-30 ENCOUNTER — HOSPITAL ENCOUNTER (EMERGENCY)
Facility: HOSPITAL | Age: 28
Discharge: HOME | End: 2018-01-30
Attending: EMERGENCY MEDICINE | Admitting: EMERGENCY MEDICINE
Payer: COMMERCIAL

## 2018-01-30 ENCOUNTER — APPOINTMENT (EMERGENCY)
Dept: ULTRASOUND IMAGING | Facility: HOSPITAL | Age: 28
End: 2018-01-30
Payer: COMMERCIAL

## 2018-01-30 ENCOUNTER — APPOINTMENT (EMERGENCY)
Dept: CT IMAGING | Facility: HOSPITAL | Age: 28
End: 2018-01-30
Payer: COMMERCIAL

## 2018-01-30 VITALS
BODY MASS INDEX: 26.13 KG/M2 | RESPIRATION RATE: 16 BRPM | TEMPERATURE: 98.3 F | DIASTOLIC BLOOD PRESSURE: 69 MMHG | OXYGEN SATURATION: 98 % | HEART RATE: 85 BPM | SYSTOLIC BLOOD PRESSURE: 124 MMHG | WEIGHT: 174.38 LBS

## 2018-01-30 DIAGNOSIS — N83.202 CYST OF LEFT OVARY: ICD-10-CM

## 2018-01-30 DIAGNOSIS — N84.0 ENDOMETRIAL POLYP: ICD-10-CM

## 2018-01-30 DIAGNOSIS — R10.9 ABDOMINAL PAIN: Primary | ICD-10-CM

## 2018-01-30 LAB
ALBUMIN SERPL BCP-MCNC: 3.8 G/DL (ref 3.5–5)
ALP SERPL-CCNC: 33 U/L (ref 46–116)
ALT SERPL W P-5'-P-CCNC: 22 U/L (ref 12–78)
ANION GAP SERPL CALCULATED.3IONS-SCNC: 6 MMOL/L (ref 4–13)
AST SERPL W P-5'-P-CCNC: 15 U/L (ref 5–45)
BASOPHILS # BLD AUTO: 0.02 THOUSANDS/ΜL (ref 0–0.1)
BASOPHILS NFR BLD AUTO: 0 % (ref 0–1)
BILIRUB SERPL-MCNC: 0.3 MG/DL (ref 0.2–1)
BUN SERPL-MCNC: 7 MG/DL (ref 5–25)
CALCIUM SERPL-MCNC: 8.9 MG/DL (ref 8.3–10.1)
CHLORIDE SERPL-SCNC: 105 MMOL/L (ref 100–108)
CLARITY, POC: CLEAR
CO2 SERPL-SCNC: 27 MMOL/L (ref 21–32)
COLOR, POC: YELLOW
CREAT SERPL-MCNC: 0.78 MG/DL (ref 0.6–1.3)
EOSINOPHIL # BLD AUTO: 0.07 THOUSAND/ΜL (ref 0–0.61)
EOSINOPHIL NFR BLD AUTO: 1 % (ref 0–6)
ERYTHROCYTE [DISTWIDTH] IN BLOOD BY AUTOMATED COUNT: 12.5 % (ref 11.6–15.1)
EXT BILIRUBIN, UA: NORMAL
EXT BLOOD URINE: NORMAL
EXT GLUCOSE, UA: NORMAL
EXT KETONES: NORMAL
EXT NITRITE, UA: NORMAL
EXT PH, UA: 6
EXT PREG TEST URINE: NEGATIVE
EXT PROTEIN, UA: NORMAL
EXT SPECIFIC GRAVITY, UA: 1.02
EXT UROBILINOGEN: 0.2
GFR SERPL CREATININE-BSD FRML MDRD: 106 ML/MIN/1.73SQ M
GLUCOSE SERPL-MCNC: 91 MG/DL (ref 65–140)
HCT VFR BLD AUTO: 37 % (ref 34.8–46.1)
HGB BLD-MCNC: 12.4 G/DL (ref 11.5–15.4)
LIPASE SERPL-CCNC: 119 U/L (ref 73–393)
LYMPHOCYTES # BLD AUTO: 1.47 THOUSANDS/ΜL (ref 0.6–4.47)
LYMPHOCYTES NFR BLD AUTO: 25 % (ref 14–44)
MCH RBC QN AUTO: 31.3 PG (ref 26.8–34.3)
MCHC RBC AUTO-ENTMCNC: 33.5 G/DL (ref 31.4–37.4)
MCV RBC AUTO: 93 FL (ref 82–98)
MONOCYTES # BLD AUTO: 0.38 THOUSAND/ΜL (ref 0.17–1.22)
MONOCYTES NFR BLD AUTO: 7 % (ref 4–12)
NEUTROPHILS # BLD AUTO: 3.94 THOUSANDS/ΜL (ref 1.85–7.62)
NEUTS SEG NFR BLD AUTO: 67 % (ref 43–75)
PLATELET # BLD AUTO: 280 THOUSANDS/UL (ref 149–390)
PMV BLD AUTO: 9.7 FL (ref 8.9–12.7)
POTASSIUM SERPL-SCNC: 3.8 MMOL/L (ref 3.5–5.3)
PROT SERPL-MCNC: 6.8 G/DL (ref 6.4–8.2)
RBC # BLD AUTO: 3.96 MILLION/UL (ref 3.81–5.12)
SODIUM SERPL-SCNC: 138 MMOL/L (ref 136–145)
WBC # BLD AUTO: 5.88 THOUSAND/UL (ref 4.31–10.16)
WBC # BLD EST: NORMAL 10*3/UL

## 2018-01-30 PROCEDURE — 87591 N.GONORRHOEAE DNA AMP PROB: CPT | Performed by: PHYSICIAN ASSISTANT

## 2018-01-30 PROCEDURE — 96376 TX/PRO/DX INJ SAME DRUG ADON: CPT

## 2018-01-30 PROCEDURE — 96375 TX/PRO/DX INJ NEW DRUG ADDON: CPT

## 2018-01-30 PROCEDURE — 99284 EMERGENCY DEPT VISIT MOD MDM: CPT

## 2018-01-30 PROCEDURE — 81002 URINALYSIS NONAUTO W/O SCOPE: CPT | Performed by: PHYSICIAN ASSISTANT

## 2018-01-30 PROCEDURE — 80053 COMPREHEN METABOLIC PANEL: CPT | Performed by: PHYSICIAN ASSISTANT

## 2018-01-30 PROCEDURE — 96361 HYDRATE IV INFUSION ADD-ON: CPT

## 2018-01-30 PROCEDURE — 76856 US EXAM PELVIC COMPLETE: CPT

## 2018-01-30 PROCEDURE — 83690 ASSAY OF LIPASE: CPT | Performed by: PHYSICIAN ASSISTANT

## 2018-01-30 PROCEDURE — 85025 COMPLETE CBC W/AUTO DIFF WBC: CPT | Performed by: PHYSICIAN ASSISTANT

## 2018-01-30 PROCEDURE — 87491 CHLMYD TRACH DNA AMP PROBE: CPT | Performed by: PHYSICIAN ASSISTANT

## 2018-01-30 PROCEDURE — 36415 COLL VENOUS BLD VENIPUNCTURE: CPT | Performed by: PHYSICIAN ASSISTANT

## 2018-01-30 PROCEDURE — 74177 CT ABD & PELVIS W/CONTRAST: CPT

## 2018-01-30 PROCEDURE — 96374 THER/PROPH/DIAG INJ IV PUSH: CPT

## 2018-01-30 PROCEDURE — 81025 URINE PREGNANCY TEST: CPT | Performed by: PHYSICIAN ASSISTANT

## 2018-01-30 PROCEDURE — 76830 TRANSVAGINAL US NON-OB: CPT

## 2018-01-30 RX ORDER — OXYCODONE HYDROCHLORIDE 5 MG/1
5 TABLET ORAL EVERY 6 HOURS PRN
Qty: 8 TABLET | Refills: 0 | Status: SHIPPED | OUTPATIENT
Start: 2018-01-30 | End: 2018-02-01

## 2018-01-30 RX ORDER — MORPHINE SULFATE 4 MG/ML
4 INJECTION, SOLUTION INTRAMUSCULAR; INTRAVENOUS ONCE
Status: COMPLETED | OUTPATIENT
Start: 2018-01-30 | End: 2018-01-30

## 2018-01-30 RX ORDER — DULOXETIN HYDROCHLORIDE 60 MG/1
20 CAPSULE, DELAYED RELEASE ORAL DAILY
COMMUNITY
End: 2018-07-12 | Stop reason: SDUPTHER

## 2018-01-30 RX ORDER — KETOROLAC TROMETHAMINE 30 MG/ML
30 INJECTION, SOLUTION INTRAMUSCULAR; INTRAVENOUS ONCE
Status: COMPLETED | OUTPATIENT
Start: 2018-01-30 | End: 2018-01-30

## 2018-01-30 RX ORDER — ONDANSETRON 2 MG/ML
4 INJECTION INTRAMUSCULAR; INTRAVENOUS ONCE
Status: COMPLETED | OUTPATIENT
Start: 2018-01-30 | End: 2018-01-30

## 2018-01-30 RX ADMIN — IOHEXOL 50 ML: 240 INJECTION, SOLUTION INTRATHECAL; INTRAVASCULAR; INTRAVENOUS; ORAL at 13:50

## 2018-01-30 RX ADMIN — ONDANSETRON 4 MG: 2 INJECTION INTRAMUSCULAR; INTRAVENOUS at 12:41

## 2018-01-30 RX ADMIN — IOHEXOL 100 ML: 350 INJECTION, SOLUTION INTRAVENOUS at 15:11

## 2018-01-30 RX ADMIN — MORPHINE SULFATE 4 MG: 4 INJECTION INTRAVENOUS at 15:50

## 2018-01-30 RX ADMIN — SODIUM CHLORIDE 1000 ML: 0.9 INJECTION, SOLUTION INTRAVENOUS at 12:41

## 2018-01-30 RX ADMIN — MORPHINE SULFATE 4 MG: 4 INJECTION INTRAVENOUS at 12:45

## 2018-01-30 RX ADMIN — KETOROLAC TROMETHAMINE 30 MG: 30 INJECTION, SOLUTION INTRAMUSCULAR at 17:29

## 2018-01-30 NOTE — ED NOTES
D/C instructions discussed  Educated on f/u, medication, and s/s of when to return to the ED  All questions answered  Ambulatory off unit with steady gait and no s/s of acute distress  Pt understands she may not drive  Friend at bedside to drive pt home        Trixie Hernandez RN  01/30/18 1327

## 2018-01-30 NOTE — ED PROVIDER NOTES
History  Chief Complaint   Patient presents with    Abdominal Pain     Pt  reports low abdominal pain, worse on right  Pt  reports nausea/gas  Symptoms started on Saturday  History of celiac     79-year-old female presents to the emergency department with complaints of abdominal pain  States she has had abdominal pain over the past 3 days which seems to be worse on the right side but radiates throughout the entire abdomen  States she has some nausea and feels like she needs to have a bowel movement when she had an episode of intense pain  Describes a constant dull and aching pain with intermittent sharp and squeezing pain in the right lower quadrant  No previous abdominal surgeries  No fevers  Denies any urinary symptoms  States that pain initially was every 4-6 hours and lasted approximately 20 minutes but now seems to occur every 2 hours lasting approximately 20 minutes  Did try Percocet that she had at home from a previous prescription without relief of her symptoms  History provided by:  Patient   used: No    Abdominal Pain   Pain location:  RLQ  Pain quality: aching and sharp    Pain severity:  Moderate  Onset quality:  Gradual  Duration:  3 days  Timing:  Intermittent  Progression:  Waxing and waning  Chronicity:  New  Context: not alcohol use, not awakening from sleep, not diet changes, not eating, not laxative use, not medication withdrawal, not previous surgeries, not recent illness, not recent sexual activity, not retching, not sick contacts, not suspicious food intake and not trauma    Relieved by:  Nothing  Ineffective treatments: Percocet    Associated symptoms: nausea    Associated symptoms: no anorexia, no belching, no chest pain, no chills, no constipation, no cough, no diarrhea, no dysuria, no fatigue, no fever, no flatus, no hematemesis, no hematochezia, no hematuria, no melena, no shortness of breath, no sore throat, no vaginal bleeding, no vaginal discharge and no vomiting        Prior to Admission Medications   Prescriptions Last Dose Informant Patient Reported? Taking? ALPRAZolam (XANAX) 0 25 mg tablet   Yes No   Sig: Take 0 25 mg by mouth daily at bedtime as needed for anxiety  DULoxetine (CYMBALTA) 60 mg delayed release capsule   Yes Yes   Sig: Take 20 mg by mouth daily   Levonorgest-Eth Estrad 91-Day (SEASONIQUE PO)   Yes Yes   Sig: Take 1 tablet by mouth daily  Methylprednisolone 4 MG TBPK   No No   Sig: Use as directed on package   diazepam (VALIUM) 5 mg tablet   No No   Sig: Take 1 tablet by mouth every 8 (eight) hours as needed for muscle spasms for up to 10 days   omeprazole (PriLOSEC) 20 mg delayed release capsule   Yes No   Sig: Take 20 mg by mouth daily  ondansetron (ZOFRAN) 4 mg tablet   Yes No   Sig: Take 4 mg by mouth every 8 (eight) hours as needed for nausea or vomiting    sertraline (ZOLOFT) 25 mg tablet   Yes No   Sig: Take 25 mg by mouth daily  Facility-Administered Medications: None       Past Medical History:   Diagnosis Date    Anxiety        Past Surgical History:   Procedure Laterality Date    BACK SURGERY      discectomy L5-S1    VT COLONOSCOPY FLX DX W/COLLJ SPEC WHEN PFRMD N/A 7/28/2016    Procedure: COLONOSCOPY;  Surgeon: Stone Meeks MD;  Location: [facility]; Service: Gastroenterology       History reviewed  No pertinent family history  I have reviewed and agree with the history as documented  Social History   Substance Use Topics    Smoking status: Never Smoker    Smokeless tobacco: Never Used    Alcohol use No        Review of Systems   Constitutional: Negative for activity change, appetite change, chills, fatigue and fever  HENT: Negative for congestion, dental problem, drooling, ear discharge, ear pain, mouth sores, nosebleeds, rhinorrhea, sore throat and trouble swallowing  Eyes: Negative for pain, discharge and itching     Respiratory: Negative for cough, chest tightness, shortness of breath and wheezing  Cardiovascular: Negative for chest pain and palpitations  Gastrointestinal: Positive for abdominal pain and nausea  Negative for anorexia, blood in stool, constipation, diarrhea, flatus, hematemesis, hematochezia, melena and vomiting  Endocrine: Negative for cold intolerance and heat intolerance  Genitourinary: Negative for difficulty urinating, dysuria, flank pain, frequency, hematuria, urgency, vaginal bleeding and vaginal discharge  Skin: Negative for rash and wound  Allergic/Immunologic: Negative for food allergies and immunocompromised state  Neurological: Negative for dizziness, seizures, syncope, weakness, numbness and headaches  Psychiatric/Behavioral: Negative for agitation, behavioral problems and confusion  Physical Exam  ED Triage Vitals [01/30/18 0943]   Temperature Pulse Respirations Blood Pressure SpO2   98 3 °F (36 8 °C) 94 18 135/76 97 %      Temp Source Heart Rate Source Patient Position - Orthostatic VS BP Location FiO2 (%)   Oral Monitor Sitting Left arm --      Pain Score       2           Orthostatic Vital Signs  Vitals:    01/30/18 0943 01/30/18 1349   BP: 135/76 128/83   Pulse: 94 89   Patient Position - Orthostatic VS: Sitting Lying       Physical Exam   Constitutional: She is oriented to person, place, and time  She appears well-developed and well-nourished  No distress  HENT:   Head: Normocephalic and atraumatic  Right Ear: External ear normal    Left Ear: External ear normal    Mouth/Throat: Oropharynx is clear and moist  No oropharyngeal exudate  Eyes: Conjunctivae are normal    Neck: No JVD present  No tracheal deviation present  Cardiovascular: Normal rate, regular rhythm and normal heart sounds  Exam reveals no gallop and no friction rub  No murmur heard  Pulmonary/Chest: Effort normal and breath sounds normal  No respiratory distress  She has no wheezes  She has no rales  She exhibits no tenderness  Abdominal: Soft   Bowel sounds are normal  She exhibits no distension  There is generalized tenderness and tenderness in the right lower quadrant  There is no guarding and no CVA tenderness  Musculoskeletal: Normal range of motion  She exhibits no edema, tenderness or deformity  Lymphadenopathy:     She has no cervical adenopathy  Neurological: She is alert and oriented to person, place, and time  Skin: Skin is warm and dry  No rash noted  She is not diaphoretic  No erythema  Psychiatric: She has a normal mood and affect  Her behavior is normal    Nursing note and vitals reviewed  ED Medications  Medications   sodium chloride 0 9 % bolus 1,000 mL (0 mL Intravenous Stopped 1/30/18 1349)   ondansetron (ZOFRAN) injection 4 mg (4 mg Intravenous Given 1/30/18 1241)   morphine (PF) 4 mg/mL injection 4 mg (4 mg Intravenous Given 1/30/18 1245)   iohexol (OMNIPAQUE) 240 MG/ML solution 50 mL (50 mL Oral Given 1/30/18 1350)   iohexol (OMNIPAQUE) 350 MG/ML injection (MULTI-DOSE) 100 mL (100 mL Intravenous Given 1/30/18 1511)   morphine (PF) 4 mg/mL injection 4 mg (4 mg Intravenous Given 1/30/18 1550)       Diagnostic Studies  Results Reviewed     Procedure Component Value Units Date/Time    Chlamydia/GC amplified DNA by PCR [[id number]] Collected:  01/30/18 1638    Lab Status:   In process Specimen:  Urine from Urine, Other Updated:  01/30/18 1642    Comprehensive metabolic panel [[id number]]  (Abnormal) Collected:  01/30/18 1241    Lab Status:  Final result Specimen:  Blood from Arm, Right Updated:  01/30/18 1308     Sodium 138 mmol/L      Potassium 3 8 mmol/L      Chloride 105 mmol/L      CO2 27 mmol/L      Anion Gap 6 mmol/L      BUN 7 mg/dL      Creatinine 0 78 mg/dL      Glucose 91 mg/dL      Calcium 8 9 mg/dL      AST 15 U/L      ALT 22 U/L      Alkaline Phosphatase 33 (L) U/L      Total Protein 6 8 g/dL      Albumin 3 8 g/dL      Total Bilirubin 0 30 mg/dL      eGFR 106 ml/min/1 73sq m     Narrative:         National Kidney Disease Education Program recommendations are as follows:  GFR calculation is accurate only with a steady state creatinine  Chronic Kidney disease less than 60 ml/min/1 73 sq  meters  Kidney failure less than 15 ml/min/1 73 sq  meters      Lipase [[id number]]  (Normal) Collected:  01/30/18 1241    Lab Status:  Final result Specimen:  Blood from Arm, Right Updated:  01/30/18 1308     Lipase 119 u/L     CBC and differential [[id number]]  (Normal) Collected:  01/30/18 1241    Lab Status:  Final result Specimen:  Blood from Arm, Right Updated:  01/30/18 1253     WBC 5 88 Thousand/uL      RBC 3 96 Million/uL      Hemoglobin 12 4 g/dL      Hematocrit 37 0 %      MCV 93 fL      MCH 31 3 pg      MCHC 33 5 g/dL      RDW 12 5 %      MPV 9 7 fL      Platelets 604 Thousands/uL      Neutrophils Relative 67 %      Lymphocytes Relative 25 %      Monocytes Relative 7 %      Eosinophils Relative 1 %      Basophils Relative 0 %      Neutrophils Absolute 3 94 Thousands/µL      Lymphocytes Absolute 1 47 Thousands/µL      Monocytes Absolute 0 38 Thousand/µL      Eosinophils Absolute 0 07 Thousand/µL      Basophils Absolute 0 02 Thousands/µL     POCT pregnancy, urine [[id number]]  (Normal) Resulted:  01/30/18 1220    Lab Status:  Final result Updated:  01/30/18 1220     EXT PREG TEST UR (Ref: Negative) negative    POCT urinalysis dipstick [[id number]]  (Normal) Resulted:  01/30/18 1219    Lab Status:  Final result Specimen:  Urine Updated:  01/30/18 1220     Color, UA yellow     Clarity, UA clear     EXT Glucose, UA neg     EXT Bilirubin, UA (Ref: Negative) neg     EXT Ketones, UA (Ref: Negative) neg     EXT Spec Grav, UA 1 025     EXT Blood, UA (Ref: Negative) neg     EXT pH, UA 6 0     EXT Protein, UA (Ref: Negative) neg     EXT Urobilinogen, UA (Ref: 0 2- 1 0) 0 2     EXT Leukocytes, UA (Ref: Negative) neg     EXT Nitrite, UA (Ref: Negative) neg                 US pelvis complete w transvaginal   Final Result by Gabe Kohli MD (01/30 1658)       Rounded echogenic lesion in the endometrial measuring 6 mm x 6 mm x 2 mm without vascularity of uncertain etiology but could represent a small polyp or submucosal fibroid  Simple left ovarian cyst measures 3 2 x 2 9 x 2 7 cm  Workstation performed: [id number]         CT abdomen pelvis with contrast   Final Result by Srinivasan Gar MD (01/30 1457)      3 4 cm left ovarian cyst/corpus luteum  Workstation performed: [id number]                    Procedures  Procedures       Phone Contacts  ED Phone Contact    ED Course  ED Course                                MDM  Number of Diagnoses or Management Options  Abdominal pain:   Cyst of left ovary:   Endometrial polyp:   Diagnosis management comments: Differential diagnosis includes but not limited to:  Appendicitis, volvulus, intussusception, kidney stone, ovarian cyst, ovarian cyst with rupture  Ovarian torsion less likely  Amount and/or Complexity of Data Reviewed  Clinical lab tests: ordered and reviewed  Tests in the radiology section of CPT®: ordered and reviewed      CritCare Time    Disposition  Final diagnoses:   Abdominal pain   Endometrial polyp   Cyst of left ovary     Time reflects when diagnosis was documented in both MDM as applicable and the Disposition within this note     Time User Action Codes Description Comment    1/30/2018  5:13 PM Harvey Section Add [R10 9] Abdominal pain     1/30/2018  5:13 PM Harvey Section Add [N84 0] Endometrial polyp     1/30/2018  5:13 PM Harvey Section Add [N83 202] Cyst of left ovary       ED Disposition     ED Disposition Condition Comment    Discharge  [address] discharge to home/self care  Condition at discharge: Stable        Follow-up Information     Follow up With Specialties Details Why Noah Matthew MD Gastroenterology Schedule an appointment as soon as possible for a visit  [address] [place] [address]  [phone]      Jefe Barksdale MD Obstetrics and Gynecology Schedule an appointment as soon as possible for a visit  [facility]  [address]          Patient's Medications   Discharge Prescriptions    OXYCODONE (ROXICODONE) 5 MG IMMEDIATE RELEASE TABLET    Take 1 tablet (5 mg total) by mouth every 6 (six) hours as needed for moderate pain for up to 2 days Max Daily Amount: 20 mg       Start Date: 1/30/2018 End Date: 2/1/2018       Order Dose: 5 mg       Quantity: 8 tablet    Refills: 0     No discharge procedures on file      ED Provider  Electronically Signed by           Dheeraj George PA-C  01/30/18 8158

## 2018-01-30 NOTE — DISCHARGE INSTRUCTIONS
Abdominal Pain   WHAT YOU NEED TO KNOW:   Abdominal pain can be dull, achy, or sharp  You may have pain in one area of your abdomen, or in your entire abdomen  Your pain may be caused by a condition such as constipation, food sensitivity or poisoning, infection, or a blockage  Abdominal pain can also be from a hernia, appendicitis, or an ulcer  Liver, gallbladder, or kidney conditions can also cause abdominal pain  The cause of your abdominal pain may be unknown  DISCHARGE INSTRUCTIONS:   Return to the emergency department if:   · You have new chest pain or shortness of breath  · You have pulsing pain in your upper abdomen or lower back that suddenly becomes constant  · Your pain is in the right lower abdominal area and worsens with movement  · You have a fever over 100 4°F (38°C) or shaking chills  · You are vomiting and cannot keep food or liquids down  · Your pain does not improve or gets worse over the next 8 to 12 hours  · You see blood in your vomit or bowel movements, or they look black and tarry  · Your skin or the whites of your eyes turn yellow  · You are a woman and have a large amount of vaginal bleeding that is not your monthly period  Contact your healthcare provider if:   · You have pain in your lower back  · You are a man and have pain in your testicles  · You have pain when you urinate  · You have questions or concerns about your condition or care  Follow up with your healthcare provider within 24 hours or as directed:  Write down your questions so you remember to ask them during your visits  Medicines:   · Medicines  may be given to calm your stomach and prevent vomiting or to decrease pain  Ask how to take pain medicine safely  · Take your medicine as directed  Contact your healthcare provider if you think your medicine is not helping or if you have side effects  Tell him of her if you are allergic to any medicine   Keep a list of the medicines, vitamins, and herbs you take  Include the amounts, and when and why you take them  Bring the list or the pill bottles to follow-up visits  Carry your medicine list with you in case of an emergency  © 2017 [employer] Information is for End User's use only and may not be sold, redistributed or otherwise used for commercial purposes  All illustrations and images included in CareNotes® are the copyrighted property of A D A M , Inc  or [employer]  The above information is an  only  It is not intended as medical advice for individual conditions or treatments  Talk to your doctor, nurse or pharmacist before following any medical regimen to see if it is safe and effective for you  Ovarian Cyst   WHAT YOU NEED TO KNOW:   An ovarian cyst is a sac that grows on an ovary  This sac usually contains fluid, but may sometimes have blood or tissue in it  Most ovarian cysts are harmless and go away without treatment in a few months  Some cysts can grow large, cause pain, or break open  DISCHARGE INSTRUCTIONS:   Call 911 for any of the following:   · You are too weak or dizzy to stand up  Return to the emergency department if:   · You have severe abdominal pain  The pain may be sharp and sudden  · You have a fever  Contact your healthcare provider if:   · Your periods are early, late, or more painful than usual     · You have bleeding from your vagina that is not your period  · You have abdominal pain all the time  · Your abdomen is swollen  · You have feelings of fullness, pressure, or discomfort in your abdomen  · You have trouble urinating or emptying your bladder completely  · You have pain during sex  · You are losing weight without trying  · You have questions or concerns about your condition or care  Medicines: You may need any of the following:  · NSAIDs , such as ibuprofen, help decrease swelling, pain, and fever   This medicine is available with or without a doctor's order  NSAIDs can cause stomach bleeding or kidney problems in certain people  If you take blood thinner medicine, always ask if NSAIDs are safe for you  Always read the medicine label and follow directions  Do not give these medicines to children under 10months of age without direction from your child's healthcare provider  · Birth control pills  may help to control your periods, prevent cysts, or cause them to shrink  · Take your medicine as directed  Contact your healthcare provider if you think your medicine is not helping or if you have side effects  Tell him or her if you are allergic to any medicine  Keep a list of the medicines, vitamins, and herbs you take  Include the amounts, and when and why you take them  Bring the list or the pill bottles to follow-up visits  Carry your medicine list with you in case of an emergency  Follow up with your healthcare provider as directed:  Write down your questions so you remember to ask them during your visits  Apply heat to decrease pain and cramping:  Sit in a warm bath, or place a heating pad (turned on low) or a hot water bottle on your abdomen  Do this for 15 to 20 minutes every hour for as many days as directed  © 2017 [address] [employer]  Information is for End User's use only and may not be sold, redistributed or otherwise used for commercial purposes  All illustrations and images included in CareNotes® are the copyrighted property of A D [employer] M , Inc  or [employer] Morrison  The above information is an  only  It is not intended as medical advice for individual conditions or treatments  Talk to your doctor, nurse or pharmacist before following any medical regimen to see if it is safe and effective for you

## 2018-01-31 LAB
CHLAMYDIA DNA CVX QL NAA+PROBE: NORMAL
N GONORRHOEA DNA GENITAL QL NAA+PROBE: NORMAL

## 2018-04-22 ENCOUNTER — APPOINTMENT (EMERGENCY)
Dept: CT IMAGING | Facility: HOSPITAL | Age: 28
End: 2018-04-22
Payer: COMMERCIAL

## 2018-04-22 ENCOUNTER — OFFICE VISIT (OUTPATIENT)
Dept: URGENT CARE | Facility: MEDICAL CENTER | Age: 28
End: 2018-04-22
Payer: COMMERCIAL

## 2018-04-22 ENCOUNTER — HOSPITAL ENCOUNTER (EMERGENCY)
Facility: HOSPITAL | Age: 28
Discharge: HOME | End: 2018-04-22
Attending: EMERGENCY MEDICINE
Payer: COMMERCIAL

## 2018-04-22 VITALS
TEMPERATURE: 98.6 F | HEART RATE: 82 BPM | RESPIRATION RATE: 16 BRPM | BODY MASS INDEX: 28.44 KG/M2 | WEIGHT: 189.82 LBS | SYSTOLIC BLOOD PRESSURE: 121 MMHG | DIASTOLIC BLOOD PRESSURE: 73 MMHG | OXYGEN SATURATION: 97 %

## 2018-04-22 VITALS
HEART RATE: 106 BPM | DIASTOLIC BLOOD PRESSURE: 84 MMHG | SYSTOLIC BLOOD PRESSURE: 126 MMHG | RESPIRATION RATE: 18 BRPM | TEMPERATURE: 98.5 F | BODY MASS INDEX: 25.92 KG/M2 | WEIGHT: 173 LBS | OXYGEN SATURATION: 98 %

## 2018-04-22 DIAGNOSIS — S06.0X9A CONCUSSION: Primary | ICD-10-CM

## 2018-04-22 DIAGNOSIS — S09.90XA TRAUMATIC INJURY OF HEAD, INITIAL ENCOUNTER: Primary | ICD-10-CM

## 2018-04-22 LAB — EXT PREG TEST URINE: NEGATIVE

## 2018-04-22 PROCEDURE — 99284 EMERGENCY DEPT VISIT MOD MDM: CPT

## 2018-04-22 PROCEDURE — 99213 OFFICE O/P EST LOW 20 MIN: CPT

## 2018-04-22 PROCEDURE — 81025 URINE PREGNANCY TEST: CPT | Performed by: EMERGENCY MEDICINE

## 2018-04-22 PROCEDURE — 70450 CT HEAD/BRAIN W/O DYE: CPT

## 2018-04-22 RX ORDER — DEXAMETHASONE 4 MG/1
10 TABLET ORAL ONCE
Status: COMPLETED | OUTPATIENT
Start: 2018-04-22 | End: 2018-04-22

## 2018-04-22 RX ORDER — IBUPROFEN 400 MG/1
400 TABLET ORAL ONCE
Status: COMPLETED | OUTPATIENT
Start: 2018-04-22 | End: 2018-04-22

## 2018-04-22 RX ADMIN — DEXAMETHASONE 10 MG: 4 TABLET ORAL at 22:36

## 2018-04-22 RX ADMIN — IBUPROFEN 400 MG: 400 TABLET ORAL at 22:31

## 2018-04-22 NOTE — PROGRESS NOTES
[facility]        NAME: Hailey Joshua is a 24 y o  female  : 1990    MRN: [id number]  DATE: 2018  TIME: 7:17 PM    Assessment and Plan   Traumatic injury of head, initial encounter [S09 90XA]  1  Traumatic injury of head, initial encounter           Patient Instructions       Follow up with PCP in 3-5 days  Proceed to  ER if symptoms worsen  Chief Complaint     Chief Complaint   Patient presents with    Head Injury         History of Present Illness       This is a 23 y/o F here c/o headache, dizziness x 1 day  Pt reports yesterday she bent down and hit her head on a shelf  Pt reports it is the worst headache of her life  Pt denies any previous concussion or head injuries  Pt denies any nausea, vomiting  Pt reports she is unsure if she had LOC  Pt reports she fell to floor instantly  Review of Systems   Review of Systems   Constitutional: Negative for chills, fatigue and fever  HENT: Negative for congestion, ear pain, hearing loss, postnasal drip, sinus pain, sinus pressure and sore throat  Eyes: Negative for pain and discharge  Respiratory: Negative for chest tightness and shortness of breath  Cardiovascular: Negative for chest pain  Gastrointestinal: Negative for abdominal pain, constipation, nausea and vomiting  Genitourinary: Negative for difficulty urinating  Musculoskeletal: Negative for arthralgias and myalgias  Skin: Negative for rash  Neurological: Positive for dizziness, light-headedness and headaches  Psychiatric/Behavioral: Negative for behavioral problems  Current Medications       Current Outpatient Prescriptions:     ALPRAZolam (XANAX) 0 25 mg tablet, Take 0 25 mg by mouth daily at bedtime as needed for anxiety  , Disp: , Rfl:     DULoxetine (CYMBALTA) 60 mg delayed release capsule, Take 20 mg by mouth daily, Disp: , Rfl:     Levonorgest-Eth Estrad -Day (SEASONIQUE PO), Take 1 tablet by mouth daily  , Disp: , Rfl:    omeprazole (PriLOSEC) 20 mg delayed release capsule, Take 20 mg by mouth daily  , Disp: , Rfl:     sertraline (ZOLOFT) 25 mg tablet, Take 25 mg by mouth daily  , Disp: , Rfl:     Current Allergies     Allergies as of 04/22/2018 - Reviewed 04/22/2018   Allergen Reaction Noted    Gluten meal  06/28/2017    Hyoscyamine  07/28/2016    Amoxicillin Rash 07/28/2016            The following portions of the patient's history were reviewed and updated as appropriate: allergies, current medications, past family history, past medical history, past social history, past surgical history and problem list      Past Medical History:   Diagnosis Date    Anxiety        Past Surgical History:   Procedure Laterality Date    BACK SURGERY      discectomy L5-S1    [place] COLONOSCOPY FLX DX W/COLLJ SPEC WHEN PFRMD N/A 7/28/2016    Procedure: COLONOSCOPY;  Surgeon: Earlene Villagomez MD;  Location: [facility]; Service: Gastroenterology       No family history on file  Medications have been verified  Objective   /84   Pulse (!) 106   Temp 98 5 °F (36 9 °C) (Temporal)   Resp 18   Wt 78 5 kg (173 lb)   SpO2 98%   BMI 25 92 kg/m²        Physical Exam     Physical Exam   Constitutional: She is oriented to person, place, and time  She appears well-developed and well-nourished  HENT:   Right Ear: Tympanic membrane and external ear normal    Left Ear: Tympanic membrane and external ear normal    Eyes: EOM are normal  Pupils are equal, round, and reactive to light  Neck: Normal range of motion  No edema present  Cardiovascular: Normal rate, regular rhythm, S1 normal, S2 normal and normal heart sounds  No murmur heard  Pulmonary/Chest: Effort normal and breath sounds normal  No respiratory distress  She has no wheezes  She has no rales  She exhibits no tenderness  Lymphadenopathy:     She has no cervical adenopathy  Neurological: She is alert and oriented to person, place, and time  No cranial nerve deficit  Skin: Skin is warm, dry and intact  No rash noted  Psychiatric: She has a normal mood and affect  Her speech is normal and behavior is normal    Nursing note and vitals reviewed

## 2018-04-22 NOTE — PATIENT INSTRUCTIONS
Pt sent to the ER for further evaluation and work up that could not be performed at our facility  Pt is stable to go by car  Pt is going to [id number] [facility]

## 2018-04-22 NOTE — PROGRESS NOTES
Pt  States she hit her head on glass shelf yesterday, No LOC  C/o headache and photophobia, denies dizziness  Nausea from "pain" no relief with tylenol and motrin

## 2018-04-23 RX ORDER — CYCLOBENZAPRINE HCL 5 MG
TABLET ORAL
COMMUNITY
Start: 2018-02-05 | End: 2018-12-14

## 2018-04-23 NOTE — ED PROVIDER NOTES
History  Chief Complaint   Patient presents with    Head Injury     Patient states yesterday around 2:30 pm she struck her forehead on a glass shelf in a store, denies any LOC at that time  Here tonight for continued severe pain in front of head and also reports sensitivity to light and sound  History provided by:  Patient and parent   used: No    23 y/o female sent from urgent care for further eval of injury occurring yesterday  Patient on vacation in [address] [place] [address] and was shopping, struck head on thick glass shelf in a store  Was ok yesterday but today had worsening frontal pain, photophobia  No changes in mental status per mom  No hx of significant injuries  No anticoagulation  Photophobic here  Normal neuro exam  Plan CT head, pain control  Prior to Admission Medications   Prescriptions Last Dose Informant Patient Reported? Taking? ALPRAZolam (XANAX) 0 25 mg tablet  Self Yes Yes   Sig: Take 0 25 mg by mouth daily at bedtime as needed for anxiety  DULoxetine (CYMBALTA) 60 mg delayed release capsule  Self Yes Yes   Sig: Take 20 mg by mouth daily   Levonorgest-Eth Estrad 91-Day (SEASONIQUE PO)  Self Yes Yes   Sig: Take 1 tablet by mouth daily  Facility-Administered Medications: None       Past Medical History:   Diagnosis Date    Anxiety        Past Surgical History:   Procedure Laterality Date    BACK SURGERY      discectomy L5-S1    AK COLONOSCOPY FLX DX W/COLLJ SPEC WHEN PFRMD N/A 7/28/2016    Procedure: COLONOSCOPY;  Surgeon: Madai Ariza MD;  [facility]; Service: Gastroenterology       History reviewed  No pertinent family history  I have reviewed and agree with the history as documented  Social History   Substance Use Topics    Smoking status: Never Smoker    Smokeless tobacco: Never Used    Alcohol use Yes      Comment: socially        Review of Systems   Constitutional: Negative for activity change and appetite change     HENT: Negative for hearing loss  Eyes: Positive for photophobia  Negative for visual disturbance  Respiratory: Negative for chest tightness and shortness of breath  Gastrointestinal: Negative for abdominal pain, nausea and vomiting  Musculoskeletal: Negative for back pain, gait problem and neck pain  Skin: Negative for color change and wound  Neurological: Positive for headaches  Negative for dizziness and weakness  Psychiatric/Behavioral: Negative for behavioral problems and confusion  All other systems reviewed and are negative  Physical Exam  ED Triage Vitals   Temperature Pulse Respirations Blood Pressure SpO2   04/22/18 2006 04/22/18 2006 04/22/18 2006 04/22/18 2006 04/22/18 2006   98 6 °F (37 °C) 81 18 125/77 97 %      Temp Source Heart Rate Source Patient Position - Orthostatic VS BP Location FiO2 (%)   04/22/18 2006 04/22/18 2030 04/22/18 2006 04/22/18 2006 --   Oral Monitor Lying Right arm       Pain Score       04/22/18 2006       6           Orthostatic Vital Signs  Vitals:    04/22/18 2006 04/22/18 2030   BP: 125/77 121/73   Pulse: 81 82   Patient Position - Orthostatic VS: Lying Lying       Physical Exam   Constitutional: She is oriented to person, place, and time  She appears well-developed and well-nourished  No distress  HENT:   Head: Normocephalic and atraumatic  Eyes: Pupils are equal, round, and reactive to light  Photophobic  Neck: Normal range of motion  Nontender c-spine  Cardiovascular: Normal rate and regular rhythm  Pulmonary/Chest: Effort normal  No respiratory distress  Abdominal: Soft  She exhibits no distension  Musculoskeletal: Normal range of motion  She exhibits no deformity  Neurological: She is alert and oriented to person, place, and time  No cranial nerve deficit or sensory deficit  She exhibits normal muscle tone  Coordination normal    Skin: Skin is warm and dry  Psychiatric: She has a normal mood and affect   Her behavior is normal    Nursing note and vitals reviewed  ED Medications  Medications   ibuprofen (MOTRIN) tablet 400 mg (400 mg Oral Given 4/22/18 2231)   dexamethasone (DECADRON) tablet 10 mg (10 mg Oral Given 4/22/18 2236)       Diagnostic Studies  Results Reviewed     Procedure Component Value Units Date/Time    POCT pregnancy, urine [[id number]]  (Normal) Resulted:  04/22/18 2136    Lab Status:  Final result Updated:  04/22/18 2136     EXT PREG TEST UR (Ref: Negative) negative                 CT head without contrast   ED Interpretation by Valentina Vanessa MD (04/22 2219)   Normal       Final Result by Leora Haskins DO (04/23 0013)   No acute intracranial abnormality  Findings are consistent with the preliminary report from [facility] which was provided shortly after completion of the exam                Workstation performed: [id number]                    Procedures  Procedures       Phone Contacts  ED Phone Contact    ED Course  ED Course                                MDM  Number of Diagnoses or Management Options  Concussion:   Diagnosis management comments: 25 y/o female with head injury occurring yesterday with worsening headaches and photophobia  Normal neuro exam  Normal head CT  Suspect concussion  Given note for work, sports med f/u as needed  Amount and/or Complexity of Data Reviewed  Tests in the radiology section of CPT®: ordered and reviewed  Obtain history from someone other than the patient: yes    Patient Progress  Patient progress: stable    CritCare Time    Disposition  Final diagnoses:   Concussion     Time reflects when diagnosis was documented in both MDM as applicable and the Disposition within this note     Time User Action Codes Description Comment    4/22/2018 10:20  Doctor Santo Altamirano [facility] 24  [[id number]] Concussion       ED Disposition     ED Disposition Condition Comment    Discharge  [address] discharge to home/self care      Condition at discharge: Stable        Follow-up Information Follow up With Specialties Details Why Contact Info Additional Information    Jody Carl [id number]  [phone] [facility], [address], [facility], [address]        Discharge Medication List as of 4/22/2018 10:20 PM      CONTINUE these medications which have NOT CHANGED    Details   ALPRAZolam (XANAX) 0 25 mg tablet Take 0 25 mg by mouth daily at bedtime as needed for anxiety  , Historical Med      DULoxetine (CYMBALTA) 60 mg delayed release capsule Take 20 mg by mouth daily, Historical Med      Levonorgest-Eth Estrad 91-Day (SEASONIQUE PO) Take 1 tablet by mouth daily  , Historical Med           No discharge procedures on file      ED Provider  Electronically Signed by           Myron Ji MD  04/23/18 5022

## 2018-04-23 NOTE — DISCHARGE INSTRUCTIONS
Concussion   WHAT YOU NEED TO KNOW:   A concussion is a mild brain injury  It is usually caused by a bump or blow to the head from a fall, a motor vehicle crash, or a sports injury  Sometimes being shaken forcefully may cause a concussion  DISCHARGE INSTRUCTIONS:   Have someone else call 911 for the following:   · Someone tries to wake you and cannot do so  · You have a seizure, increasing confusion, or a change in personality  · Your speech becomes slurred, or you have new vision problems  Return to the emergency department if:   · You have a severe headache that does not go away  · You have arm or leg weakness, numbness, or new problems with coordination  · You have blood or clear fluid coming out of the ears or nose  Contact your healthcare provider if:   · You have nausea or are vomiting  · You feel more sleepy than usual     · Your symptoms get worse  · Your symptoms last longer than 6 weeks after the injury  · You have questions or concerns about your condition or care  Medicines:   · Acetaminophen  helps to decrease pain  It is available without a doctor's order  Ask how much to take and how often to take it  Follow directions  Acetaminophen can cause liver damage if not taken correctly  · NSAIDs , such as ibuprofen, help decrease swelling and pain  NSAIDs can cause stomach bleeding or kidney problems in certain people  If you take blood thinner medicine, always ask your healthcare provider if NSAIDs are safe for you  Always read the medicine label and follow directions  · Take your medicine as directed  Contact your healthcare provider if you think your medicine is not helping or if you have side effects  Tell him or her if you are allergic to any medicine  Keep a list of the medicines, vitamins, and herbs you take  Include the amounts, and when and why you take them  Bring the list or the pill bottles to follow-up visits   Carry your medicine list with you in case of an emergency  Follow up with your healthcare provider as directed:  Write down your questions so you remember to ask them during your visits  Self-care:   · Rest  from physical and mental activities as directed  Mental activities are those that require thinking, concentration, and attention  You will need to rest until your symptoms are gone  Rest will allow you to recover from your concussion  Ask your healthcare provider when you can return to work and other daily activities  · Have someone stay with you for the first 24 hours after your injury  Your healthcare provider should be contacted if your symptoms get worse, or you develop new symptoms  · Do not participate in sports and physical activities until your healthcare provider says it is okay  They could make your symptoms worse or lead to another concussion  Your healthcare provider will tell you when it is okay for you to return to sports or physical activities  Prevent another concussion:   · Wear protective sports equipment that fit properly  Helmets help decrease your risk of a serious brain injury  Talk to your healthcare provider about ways you can decrease your risk for a concussion if you play sports  · Wear your seat belt  every time you travel  This helps to decrease your risk of a head injury if you are in a car accident  © 2017 [facility] Information is for End User's use only and may not be sold, redistributed or otherwise used for commercial purposes  All illustrations and images included in CareNotes® are the copyrighted property of [employer] , [employer]  or [employer]  The above information is an  only  It is not intended as medical advice for individual conditions or treatments  Talk to your doctor, nurse or pharmacist before following any medical regimen to see if it is safe and effective for you

## 2018-04-24 ENCOUNTER — OFFICE VISIT (OUTPATIENT)
Dept: FAMILY MEDICINE CLINIC | Facility: CLINIC | Age: 28
End: 2018-04-24
Payer: COMMERCIAL

## 2018-04-24 VITALS
OXYGEN SATURATION: 98 % | DIASTOLIC BLOOD PRESSURE: 78 MMHG | RESPIRATION RATE: 16 BRPM | WEIGHT: 178 LBS | SYSTOLIC BLOOD PRESSURE: 120 MMHG | HEART RATE: 102 BPM | BODY MASS INDEX: 26.36 KG/M2 | TEMPERATURE: 99.1 F | HEIGHT: 69 IN

## 2018-04-24 DIAGNOSIS — F41.9 ANXIETY: ICD-10-CM

## 2018-04-24 DIAGNOSIS — S06.0X0D CONCUSSION WITHOUT LOSS OF CONSCIOUSNESS, SUBSEQUENT ENCOUNTER: Primary | ICD-10-CM

## 2018-04-24 DIAGNOSIS — R11.0 NAUSEA: ICD-10-CM

## 2018-04-24 PROCEDURE — 3008F BODY MASS INDEX DOCD: CPT | Performed by: INTERNAL MEDICINE

## 2018-04-24 PROCEDURE — 99214 OFFICE O/P EST MOD 30 MIN: CPT | Performed by: INTERNAL MEDICINE

## 2018-04-24 RX ORDER — ONDANSETRON 4 MG/1
4 TABLET, ORALLY DISINTEGRATING ORAL EVERY 6 HOURS PRN
Qty: 20 TABLET | Refills: 0 | Status: SHIPPED | OUTPATIENT
Start: 2018-04-24 | End: 2018-12-14

## 2018-04-24 RX ORDER — OXYCODONE HYDROCHLORIDE AND ACETAMINOPHEN 5; 325 MG/1; MG/1
1 TABLET ORAL EVERY 4 HOURS PRN
Qty: 20 TABLET | Refills: 0 | Status: SHIPPED | OUTPATIENT
Start: 2018-04-24 | End: 2018-12-14

## 2018-04-24 NOTE — PROGRESS NOTES
Assessment/Plan:         Diagnoses and all orders for this visit:    Concussion without loss of consciousness, subsequent encounter  Comments:  she will continue aleve  to take prilosec 20mg daily  add zofran for nausea  Anxiety    Other orders  -     cyclobenzaprine (FLEXERIL) 5 mg tablet;           Subjective:      Patient ID: Cee Millard is a 24 y o  female  Pt states no real improvement with her h/a since Sunday  She was diagnosed with concussion  The following portions of the patient's history were reviewed and updated as appropriate:   She  has a past medical history of Abnormal weight gain; Anxiety; and Depression  She   Patient Active Problem List    Diagnosis Date Noted    Anxiety 12/09/2016    Celiac disease 11/30/2016    Depression 07/07/2015     She  has a past surgical history that includes Back surgery; pr colonoscopy flx dx w/collj spec when pfrmd (N/A, 7/28/2016); and Lumbar discectomy (2012)  Her family history includes Depression in her brother and mother; Diabetes in her father; Heart attack in her paternal grandmother; Hypertension in her father; Stroke in her maternal grandmother  She  reports that she has never smoked  She has never used smokeless tobacco  She reports that she drinks alcohol  She reports that she does not use drugs  Current Outpatient Prescriptions   Medication Sig Dispense Refill    ALPRAZolam (XANAX) 0 25 mg tablet Take 0 25 mg by mouth daily at bedtime as needed for anxiety   DULoxetine (CYMBALTA) 60 mg delayed release capsule Take 20 mg by mouth daily      Levonorgest-Eth Estrad 91-Day (SEASONIQUE PO) Take 1 tablet by mouth daily   cyclobenzaprine (FLEXERIL) 5 mg tablet        No current facility-administered medications for this visit  Current Outpatient Prescriptions on File Prior to Visit   Medication Sig    ALPRAZolam (XANAX) 0 25 mg tablet Take 0 25 mg by mouth daily at bedtime as needed for anxiety      DULoxetine (CYMBALTA) 60 mg delayed release capsule Take 20 mg by mouth daily    Levonorgest-Eth Estrad 91-Day (SEASONIQUE PO) Take 1 tablet by mouth daily  No current facility-administered medications on file prior to visit  She is allergic to gluten meal; hyoscyamine; and amoxicillin       Review of Systems   Constitutional: Negative  HENT: Negative  Eyes: Positive for photophobia  Respiratory: Negative  Objective:      /78 (BP Location: Left arm, Patient Position: Sitting, Cuff Size: Large)   Pulse 102   Temp 99 1 °F (37 3 °C) (Tympanic)   Resp 16   Ht 5' 9" (1 753 m)   Wt 80 7 kg (178 lb)   LMP 03/20/2018 (Approximate)   SpO2 98%   BMI 26 29 kg/m²          Physical Exam   Constitutional: She is oriented to person, place, and time  She appears well-developed and well-nourished  HENT:   Head: Normocephalic and atraumatic  Right Ear: External ear normal    Left Ear: External ear normal    Nose: Nose normal    Mouth/Throat: Oropharynx is clear and moist    Neck: Normal range of motion  Neck supple  Cardiovascular: Normal rate and regular rhythm  Pulmonary/Chest: Effort normal and breath sounds normal    Abdominal: Soft  Bowel sounds are normal    Neurological: She is alert and oriented to person, place, and time  She has normal reflexes  She displays normal reflexes  No cranial nerve deficit  She exhibits normal muscle tone   Coordination normal

## 2018-04-24 NOTE — LETTER
April 24, 2018     Patient: Vanessa Paige   YOB: 1990   Date of Visit: 4/24/2018       To Whom it May Concern:    Nano Eis is under my professional care  She was seen in my office on 4/24/2018  She may return to work on 4/27/18  If you have any questions or concerns, please don't hesitate to call           Sincerely,          Afshan Garrett, DO        CC: No Recipients

## 2018-06-25 ENCOUNTER — OFFICE VISIT (OUTPATIENT)
Dept: URGENT CARE | Facility: MEDICAL CENTER | Age: 28
End: 2018-06-25
Payer: COMMERCIAL

## 2018-06-25 VITALS
RESPIRATION RATE: 20 BRPM | WEIGHT: 173.8 LBS | BODY MASS INDEX: 25.67 KG/M2 | HEART RATE: 102 BPM | TEMPERATURE: 98.1 F | DIASTOLIC BLOOD PRESSURE: 82 MMHG | OXYGEN SATURATION: 98 % | SYSTOLIC BLOOD PRESSURE: 124 MMHG

## 2018-06-25 DIAGNOSIS — L23.9 ALLERGIC CONTACT DERMATITIS, UNSPECIFIED TRIGGER: Primary | ICD-10-CM

## 2018-06-25 PROCEDURE — 99203 OFFICE O/P NEW LOW 30 MIN: CPT | Performed by: PHYSICIAN ASSISTANT

## 2018-06-25 RX ORDER — TRIAMCINOLONE ACETONIDE 1 MG/G
CREAM TOPICAL 2 TIMES DAILY
Qty: 30 G | Refills: 0 | Status: SHIPPED | OUTPATIENT
Start: 2018-06-25 | End: 2018-12-14

## 2018-06-25 NOTE — PROGRESS NOTES
[facility]        NAME: Marge Rodriguez is a 34 y o  female  : 1990    MRN: [id number]      Assessment and Plan   Allergic contact dermatitis, unspecified trigger [L23 9]  1  Allergic contact dermatitis, unspecified trigger  triamcinolone (KENALOG) 0 1 % cream         Patient Instructions     Patient Instructions     Use steroid cream as directed  No steroid cream in or on genitals  Follow up with PCP in 3-5 days  Go to the ER for worsening symptoms  Contact Dermatitis   AMBULATORY CARE:   Contact dermatitis  is a rash  It develops when you touch something that irritates your skin or causes an allergic reaction  Common signs and symptoms include the following:   · Red, swollen, painful rash           · Skin that itches, stings, or burns    · Dry, scaly, or crusty skin patches    · Bumps or blisters    · Fluid draining from blisters  Call 911 for any of the following:   · You have sudden trouble breathing  · Your throat swells and you have trouble eating  · Your face is swollen  Contact your healthcare provider if:   · You have a fever  · Your blisters are draining pus  · Your rash spreads or does not get better, even after treatment  · You have questions or concerns about your condition or care  Treatment for contact dermatitis  involves removing any irritants or allergens that cause your rash  You may also need medicines to decrease itching and swelling  They will be given as a topical medicine to apply to your rash or as a pill  Manage contact dermatitis:   · Take short baths or showers in cool water  Use mild soap or soap-free cleansers  Add oatmeal, baking soda, or cornstarch to the bath water to help decrease skin irritation  · Avoid skin irritants , such as makeup, hair products, soaps, and cleansers  Use products that do not contain perfume or dye  · Apply a cool compress to your rash  This will help soothe your skin       · Keep your skin moist   Rub unscented cream or lotion on your skin to prevent dryness and itching  Do this right after a bath or shower when your skin is still damp  Follow up with your healthcare provider as directed:  Write down your questions so you remember to ask them during your visits  © 2017 [address] [employer] Information is for End User's use only and may not be sold, redistributed or otherwise used for commercial purposes  All illustrations and images included in CareNotes® are the copyrighted property of [employer]  or [employer]  The above information is an  only  It is not intended as medical advice for individual conditions or treatments  Talk to your doctor, nurse or pharmacist before following any medical regimen to see if it is safe and effective for you  Chief Complaint     Chief Complaint   Patient presents with    Rash     Rash on upper inner thighs X 4 weeks  Does not itch or burn; not painful  History of Present Illness       Rash   This is a new problem  The current episode started 1 to 4 weeks ago  The problem is unchanged (Pt reports she has been running outside the past month  Pt wears shorts while running)  The affected locations include the groin, left upper leg and right upper leg  Pertinent negatives include no diarrhea, facial edema, joint pain or shortness of breath  Past treatments include nothing  The treatment provided no relief  Review of Systems   Review of Systems   Constitutional: Negative  Respiratory: Negative for shortness of breath  Gastrointestinal: Negative for diarrhea  Musculoskeletal: Negative for joint pain  Skin: Positive for rash  Current Medications       Current Outpatient Prescriptions:     ALPRAZolam (XANAX) 0 25 mg tablet, Take 0 25 mg by mouth daily at bedtime as needed for anxiety  , Disp: , Rfl:     DULoxetine (CYMBALTA) 60 mg delayed release capsule, Take 20 mg by mouth daily, Disp: , Rfl:    Levonorgest-Eth Estrad 91-Day (SEASONIQUE PO), Take 1 tablet by mouth daily  , Disp: , Rfl:     cyclobenzaprine (FLEXERIL) 5 mg tablet, , Disp: , Rfl:     ondansetron (ZOFRAN-ODT) 4 mg disintegrating tablet, Take 1 tablet (4 mg total) by mouth every 6 (six) hours as needed for nausea or vomiting, Disp: 20 tablet, Rfl: 0    oxyCODONE-acetaminophen (PERCOCET) 5-325 mg per tablet, Take 1 tablet by mouth every 4 (four) hours as needed for moderate pain Max Daily Amount: 6 tablets, Disp: 20 tablet, Rfl: 0    triamcinolone (KENALOG) 0 1 % cream, Apply topically 2 (two) times a day for 7 days, Disp: 30 g, Rfl: 0    Current Allergies     Allergies as of 06/25/2018 - Reviewed 06/25/2018   Allergen Reaction Noted    Amoxicillin Rash 03/05/2015    Gluten meal GI Intolerance 06/28/2017    Hyoscyamine Hallucinations 07/21/2016            The following portions of the patient's history were reviewed and updated as appropriate: allergies, current medications, past family history, past medical history, past social history, past surgical history and problem list      Past Medical History:   Diagnosis Date    Abnormal weight gain     last assessed: 11/30/2016    Anxiety     last assessed: 12/09/2016    Depression        Past Surgical History:   Procedure Laterality Date    BACK SURGERY      discectomy L5-S1; last assessed: 07/07/2015    LUMBAR DIS[facility]CTOMY  2012    [facility] @ L5/S1    AK COLONOSCOPY FLX DX W/COLLJ [facility] WHEN PFRMD N/A 7/28/2016    Procedure: COLONOSCOPY;  Surgeon: Surya Guidry MD;  Location: [facility]; Service: Gastroenterology       Family History   Problem Relation Age of Onset    Depression Mother     Hypertension Father     Diabetes Father     Depression Brother     Stroke Maternal Grandmother     Heart attack Paternal Grandmother          Medications have been verified          Objective   /82 (BP Location: Right arm, Patient Position: Sitting, Cuff Size: Standard)   Pulse 102   Temp 98 1 °F (36 7 °C) (Temporal)   Resp 20   Wt 78 8 kg (173 lb 12 8 oz)   SpO2 98%   BMI 25 67 kg/m²        Physical Exam     Physical Exam   Constitutional: She is oriented to person, place, and time  She appears well-developed and well-nourished  No distress  Cardiovascular: Normal rate, regular rhythm and normal heart sounds  Pulmonary/Chest: Effort normal and breath sounds normal  No respiratory distress  Neurological: She is alert and oriented to person, place, and time  Skin: Rash noted  Rash is maculopapular  Rash is not pustular  There is erythema  Nursing note and vitals reviewed

## 2018-06-25 NOTE — PATIENT INSTRUCTIONS
Use steroid cream as directed  No steroid cream in or on genitals  Follow up with PCP in 3-5 days  Go to the ER for worsening symptoms  Contact Dermatitis   AMBULATORY CARE:   Contact dermatitis  is a rash  It develops when you touch something that irritates your skin or causes an allergic reaction  Common signs and symptoms include the following:   · Red, swollen, painful rash           · Skin that itches, stings, or burns    · Dry, scaly, or crusty skin patches    · Bumps or blisters    · Fluid draining from blisters  Call 911 for any of the following:   · You have sudden trouble breathing  · Your throat swells and you have trouble eating  · Your face is swollen  Contact your healthcare provider if:   · You have a fever  · Your blisters are draining pus  · Your rash spreads or does not get better, even after treatment  · You have questions or concerns about your condition or care  Treatment for contact dermatitis  involves removing any irritants or allergens that cause your rash  You may also need medicines to decrease itching and swelling  They will be given as a topical medicine to apply to your rash or as a pill  Manage contact dermatitis:   · Take short baths or showers in cool water  Use mild soap or soap-free cleansers  Add oatmeal, baking soda, or cornstarch to the bath water to help decrease skin irritation  · Avoid skin irritants , such as makeup, hair products, soaps, and cleansers  Use products that do not contain perfume or dye  · Apply a cool compress to your rash  This will help soothe your skin  · Keep your skin moist   Rub unscented cream or lotion on your skin to prevent dryness and itching  Do this right after a bath or shower when your skin is still damp  Follow up with your healthcare provider as directed:  Write down your questions so you remember to ask them during your visits     © 2017 [employer] Information is for End User's use only and may not be sold, redistributed or otherwise used for commercial purposes  All illustrations and images included in CareNotes® are the copyrighted property of [employer]  or [employer]  The above information is an  only  It is not intended as medical advice for individual conditions or treatments  Talk to your doctor, nurse or pharmacist before following any medical regimen to see if it is safe and effective for you

## 2018-07-12 DIAGNOSIS — F32.A DEPRESSION, UNSPECIFIED DEPRESSION TYPE: ICD-10-CM

## 2018-07-12 DIAGNOSIS — F33.9 EPISODE OF RECURRENT MAJOR DEPRESSIVE DISORDER, UNSPECIFIED DEPRESSION EPISODE SEVERITY (HCC): Primary | ICD-10-CM

## 2018-07-14 RX ORDER — DULOXETIN HYDROCHLORIDE 60 MG/1
60 CAPSULE, DELAYED RELEASE ORAL DAILY
Qty: 30 CAPSULE | Refills: 5 | Status: SHIPPED | OUTPATIENT
Start: 2018-07-14 | End: 2018-12-14 | Stop reason: SDUPTHER

## 2018-12-14 ENCOUNTER — OFFICE VISIT (OUTPATIENT)
Dept: FAMILY MEDICINE CLINIC | Facility: CLINIC | Age: 28
End: 2018-12-14
Payer: COMMERCIAL

## 2018-12-14 VITALS
SYSTOLIC BLOOD PRESSURE: 134 MMHG | HEART RATE: 99 BPM | DIASTOLIC BLOOD PRESSURE: 96 MMHG | RESPIRATION RATE: 16 BRPM | OXYGEN SATURATION: 99 % | HEIGHT: 69 IN | TEMPERATURE: 98.6 F | WEIGHT: 175 LBS | BODY MASS INDEX: 25.92 KG/M2

## 2018-12-14 DIAGNOSIS — F32.A DEPRESSION, UNSPECIFIED DEPRESSION TYPE: Primary | ICD-10-CM

## 2018-12-14 DIAGNOSIS — F41.9 ANXIETY: ICD-10-CM

## 2018-12-14 PROCEDURE — 99214 OFFICE O/P EST MOD 30 MIN: CPT | Performed by: INTERNAL MEDICINE

## 2018-12-14 RX ORDER — LEVONORGESTREL AND ETHINYL ESTRADIOL 150-30(84)
1 KIT ORAL DAILY
Refills: 4 | COMMUNITY
Start: 2018-10-06 | End: 2021-10-04 | Stop reason: ALTCHOICE

## 2018-12-14 RX ORDER — ALPRAZOLAM 0.25 MG/1
0.25 TABLET ORAL 3 TIMES DAILY PRN
Qty: 30 TABLET | Refills: 0 | Status: SHIPPED | OUTPATIENT
Start: 2018-12-14 | End: 2019-10-28 | Stop reason: SDUPTHER

## 2018-12-14 RX ORDER — DULOXETIN HYDROCHLORIDE 60 MG/1
60 CAPSULE, DELAYED RELEASE ORAL 2 TIMES DAILY
Qty: 60 CAPSULE | Refills: 1 | Status: SHIPPED | OUTPATIENT
Start: 2018-12-14 | End: 2018-12-27 | Stop reason: SDUPTHER

## 2018-12-14 NOTE — PATIENT INSTRUCTIONS
We have signed a contract(mom/pt/me) that she will not act on harming herself or anyone unless she speaks to me or maom  Will give mom crisis #

## 2018-12-14 NOTE — PROGRESS NOTES
Assessment/Plan:         There are no diagnoses linked to this encounter  Subjective:      Patient ID: Audie Rinne is a 34 y o  female  Pt in said that she is filing a harrassment suit against her boss  She said she was having suicidal thoughts  She said that she will not act on it  She said that her boss tried to intinidate her yesterday and she left crying  She said that she will not act on this  Depression         The following portions of the patient's history were reviewed and updated as appropriate:   She  has a past medical history of Abnormal weight gain; Anxiety; and Depression  She   Patient Active Problem List    Diagnosis Date Noted    Anxiety 12/09/2016    Celiac disease 11/30/2016    Depression 07/07/2015     She  has a past surgical history that includes Back surgery; pr colonoscopy flx dx w/collj spec when pfrmd (N/A, 7/28/2016); and Lumbar discectomy (2012)  Her family history includes Depression in her brother and mother; Diabetes in her father; Heart attack in her paternal grandmother; Hypertension in her father; Stroke in her maternal grandmother  She  reports that she has never smoked  She has never used smokeless tobacco  She reports that she drinks alcohol  She reports that she does not use drugs  Current Outpatient Prescriptions   Medication Sig Dispense Refill    ALPRAZolam (XANAX) 0 25 mg tablet Take 0 25 mg by mouth daily at bedtime as needed for anxiety   DAYSEE 0 15-0 03 &0 01 MG TABS Take 1 tablet by mouth daily  4    DULoxetine (CYMBALTA) 60 mg delayed release capsule Take 1 capsule (60 mg total) by mouth daily 30 capsule 5     No current facility-administered medications for this visit  Current Outpatient Prescriptions on File Prior to Visit   Medication Sig    ALPRAZolam (XANAX) 0 25 mg tablet Take 0 25 mg by mouth daily at bedtime as needed for anxiety      DULoxetine (CYMBALTA) 60 mg delayed release capsule Take 1 capsule (60 mg total) by mouth daily    [DISCONTINUED] Levonorgest-Eth Estrad 91-Day (SEASONIQUE PO) Take 1 tablet by mouth daily   [DISCONTINUED] cyclobenzaprine (FLEXERIL) 5 mg tablet     [DISCONTINUED] ondansetron (ZOFRAN-ODT) 4 mg disintegrating tablet Take 1 tablet (4 mg total) by mouth every 6 (six) hours as needed for nausea or vomiting    [DISCONTINUED] oxyCODONE-acetaminophen (PERCOCET) 5-325 mg per tablet Take 1 tablet by mouth every 4 (four) hours as needed for moderate pain Max Daily Amount: 6 tablets    [DISCONTINUED] triamcinolone (KENALOG) 0 1 % cream Apply topically 2 (two) times a day for 7 days     No current facility-administered medications on file prior to visit  She is allergic to amoxicillin; gluten meal; and hyoscyamine       Review of Systems   Constitutional: Negative  HENT: Negative  Respiratory: Negative  Cardiovascular: Negative  Genitourinary: Negative  Psychiatric/Behavioral: Positive for depression  Objective:      /96 (BP Location: Left arm, Patient Position: Sitting, Cuff Size: Large)   Pulse 99   Temp 98 6 °F (37 °C) (Tympanic)   Resp 16   Ht 5' 9" (1 753 m)   Wt 79 4 kg (175 lb)   LMP 10/29/2018 (Approximate)   SpO2 99%   BMI 25 84 kg/m²          Physical Exam   Constitutional: She appears well-developed and well-nourished  HENT:   Head: Normocephalic and atraumatic  Right Ear: External ear normal    Left Ear: External ear normal    Nose: Nose normal    Mouth/Throat: Oropharynx is clear and moist    Neck: Normal range of motion  Neck supple  Cardiovascular: Normal rate, regular rhythm and normal heart sounds  Pulmonary/Chest: Effort normal and breath sounds normal    Abdominal: Soft  Bowel sounds are normal    Musculoskeletal: Normal range of motion

## 2018-12-27 ENCOUNTER — OFFICE VISIT (OUTPATIENT)
Dept: FAMILY MEDICINE CLINIC | Facility: CLINIC | Age: 28
End: 2018-12-27
Payer: COMMERCIAL

## 2018-12-27 VITALS
BODY MASS INDEX: 26.36 KG/M2 | HEIGHT: 69 IN | SYSTOLIC BLOOD PRESSURE: 126 MMHG | RESPIRATION RATE: 16 BRPM | HEART RATE: 97 BPM | OXYGEN SATURATION: 99 % | WEIGHT: 178 LBS | TEMPERATURE: 98 F | DIASTOLIC BLOOD PRESSURE: 84 MMHG

## 2018-12-27 DIAGNOSIS — F32.A DEPRESSION, UNSPECIFIED DEPRESSION TYPE: ICD-10-CM

## 2018-12-27 PROCEDURE — 99213 OFFICE O/P EST LOW 20 MIN: CPT | Performed by: INTERNAL MEDICINE

## 2018-12-27 RX ORDER — DULOXETIN HYDROCHLORIDE 30 MG/1
30 CAPSULE, DELAYED RELEASE ORAL DAILY
Qty: 30 CAPSULE | Refills: 1 | Status: SHIPPED | OUTPATIENT
Start: 2018-12-27 | End: 2019-01-28

## 2018-12-27 RX ORDER — DULOXETIN HYDROCHLORIDE 60 MG/1
60 CAPSULE, DELAYED RELEASE ORAL DAILY
Qty: 30 CAPSULE | Refills: 0 | Status: SHIPPED | OUTPATIENT
Start: 2018-12-27 | End: 2019-05-09 | Stop reason: ALTCHOICE

## 2018-12-27 NOTE — PROGRESS NOTES
Assessment/Plan:         Diagnoses and all orders for this visit:    Depression, unspecified depression type  -     DULoxetine (CYMBALTA) 30 mg delayed release capsule; Take 1 capsule (30 mg total) by mouth daily Take one cap daily in addition to one 60mg cap daily(total of 90mg)  -     DULoxetine (CYMBALTA) 60 mg delayed release capsule; Take 1 capsule (60 mg total) by mouth daily Take in addition to 30mg/day(total of 90mg daily)          Subjective:      Patient ID: Shelley Bryant is a 34 y o  female  Pt feels improved now that she is not at school  The following portions of the patient's history were reviewed and updated as appropriate:   She  has a past medical history of Abnormal weight gain; Anxiety; and Depression  She   Patient Active Problem List    Diagnosis Date Noted    Anxiety 12/09/2016    Celiac disease 11/30/2016    Depression 07/07/2015     She  has a past surgical history that includes Back surgery; pr colonoscopy flx dx w/collj spec when pfrmd (N/A, 7/28/2016); and Lumbar discectomy (2012)  Her family history includes Depression in her brother and mother; Diabetes in her father; Heart attack in her paternal grandmother; Hypertension in her father; Stroke in her maternal grandmother  She  reports that she has never smoked  She has never used smokeless tobacco  She reports that she drinks alcohol  She reports that she does not use drugs  Current Outpatient Prescriptions   Medication Sig Dispense Refill    ALPRAZolam (XANAX) 0 25 mg tablet Take 0 25 mg by mouth daily at bedtime as needed for anxiety        ALPRAZolam (XANAX) 0 25 mg tablet Take 1 tablet (0 25 mg total) by mouth 3 (three) times a day as needed for anxiety One tab po q 8 hr prn anxiety 30 tablet 0    DAYSEE 0 15-0 03 &0 01 MG TABS Take 1 tablet by mouth daily  4    DULoxetine (CYMBALTA) 60 mg delayed release capsule Take 1 capsule (60 mg total) by mouth daily Take in addition to 30mg/day(total of 90mg daily) 30 capsule 0    DULoxetine (CYMBALTA) 30 mg delayed release capsule Take 1 capsule (30 mg total) by mouth daily Take one cap daily in addition to one 60mg cap daily(total of 90mg) 30 capsule 1     No current facility-administered medications for this visit  Current Outpatient Prescriptions on File Prior to Visit   Medication Sig    ALPRAZolam (XANAX) 0 25 mg tablet Take 0 25 mg by mouth daily at bedtime as needed for anxiety   ALPRAZolam (XANAX) 0 25 mg tablet Take 1 tablet (0 25 mg total) by mouth 3 (three) times a day as needed for anxiety One tab po q 8 hr prn anxiety    DAYSEE 0 15-0 03 &0 01 MG TABS Take 1 tablet by mouth daily     No current facility-administered medications on file prior to visit  She is allergic to amoxicillin; gluten meal; and hyoscyamine       Review of Systems   Constitutional: Negative  HENT: Negative  Respiratory: Negative  Cardiovascular: Negative  Psychiatric/Behavioral: Negative for self-injury and suicidal ideas  Objective:      /84 (BP Location: Right arm, Patient Position: Sitting, Cuff Size: Large)   Pulse 97   Temp 98 °F (36 7 °C) (Tympanic)   Resp 16   Ht 5' 9" (1 753 m)   Wt 80 7 kg (178 lb)   LMP 12/22/2018 (Exact Date)   SpO2 99%   BMI 26 29 kg/m²          Physical Exam   Constitutional: She appears well-developed and well-nourished  HENT:   Head: Normocephalic and atraumatic  Right Ear: External ear normal    Left Ear: External ear normal    Cardiovascular: Normal rate, regular rhythm and normal heart sounds      Pulmonary/Chest: Effort normal and breath sounds normal

## 2019-01-24 ENCOUNTER — TELEPHONE (OUTPATIENT)
Dept: FAMILY MEDICINE CLINIC | Facility: CLINIC | Age: 29
End: 2019-01-24

## 2019-01-28 ENCOUNTER — OFFICE VISIT (OUTPATIENT)
Dept: FAMILY MEDICINE CLINIC | Facility: CLINIC | Age: 29
End: 2019-01-28
Payer: COMMERCIAL

## 2019-01-28 VITALS
SYSTOLIC BLOOD PRESSURE: 128 MMHG | DIASTOLIC BLOOD PRESSURE: 88 MMHG | WEIGHT: 182 LBS | HEART RATE: 103 BPM | TEMPERATURE: 98.1 F | RESPIRATION RATE: 16 BRPM | BODY MASS INDEX: 26.96 KG/M2 | OXYGEN SATURATION: 98 % | HEIGHT: 69 IN

## 2019-01-28 DIAGNOSIS — F32.A DEPRESSION, UNSPECIFIED DEPRESSION TYPE: Primary | ICD-10-CM

## 2019-01-28 DIAGNOSIS — F41.9 ANXIETY: ICD-10-CM

## 2019-01-28 PROCEDURE — 99213 OFFICE O/P EST LOW 20 MIN: CPT | Performed by: INTERNAL MEDICINE

## 2019-01-28 PROCEDURE — 3008F BODY MASS INDEX DOCD: CPT | Performed by: INTERNAL MEDICINE

## 2019-01-28 NOTE — PROGRESS NOTES
Assessment/Plan:         Diagnoses and all orders for this visit:    Depression, unspecified depression type    Anxiety          Subjective:      Patient ID: Wyona Olszewski is a 34 y o  female  Pt on cymbalta  She never went to 90mg  She wants to try to d/c cymbalta  Discussed will decrease to 30mg and if she feels okay on 30mg to d/c  The following portions of the patient's history were reviewed and updated as appropriate: She  has a past medical history of Abnormal weight gain; Anxiety; and Depression  She   Patient Active Problem List    Diagnosis Date Noted    Anxiety 12/09/2016    Celiac disease 11/30/2016    Depression 07/07/2015     She  has a past surgical history that includes Back surgery; pr colonoscopy flx dx w/collj spec when pfrmd (N/A, 7/28/2016); and Lumbar discectomy (2012)  Her family history includes Depression in her brother and mother; Diabetes in her father; Heart attack in her paternal grandmother; Hypertension in her father; Stroke in her maternal grandmother  She  reports that she has never smoked  She has never used smokeless tobacco  She reports that she drinks alcohol  She reports that she does not use drugs  Current Outpatient Prescriptions   Medication Sig Dispense Refill    ALPRAZolam (XANAX) 0 25 mg tablet Take 1 tablet (0 25 mg total) by mouth 3 (three) times a day as needed for anxiety One tab po q 8 hr prn anxiety 30 tablet 0    DAYSEE 0 15-0 03 &0 01 MG TABS Take 1 tablet by mouth daily  4    DULoxetine (CYMBALTA) 60 mg delayed release capsule Take 1 capsule (60 mg total) by mouth daily Take in addition to 30mg/day(total of 90mg daily) 30 capsule 0     No current facility-administered medications for this visit        Current Outpatient Prescriptions on File Prior to Visit   Medication Sig    ALPRAZolam (XANAX) 0 25 mg tablet Take 1 tablet (0 25 mg total) by mouth 3 (three) times a day as needed for anxiety One tab po q 8 hr prn anxiety    Patricia Lopez 0  15-0 03 &0 01 MG TABS Take 1 tablet by mouth daily    DULoxetine (CYMBALTA) 60 mg delayed release capsule Take 1 capsule (60 mg total) by mouth daily Take in addition to 30mg/day(total of 90mg daily)     No current facility-administered medications on file prior to visit  She is allergic to amoxicillin; gluten meal; and hyoscyamine       Review of Systems   Constitutional: Negative  HENT: Negative  Eyes: Negative  Respiratory: Negative  Cardiovascular: Negative  Gastrointestinal: Negative  Objective:      /88 (BP Location: Left arm, Patient Position: Sitting, Cuff Size: Large)   Pulse 103   Temp 98 1 °F (36 7 °C) (Tympanic)   Resp 16   Ht 5' 9" (1 753 m)   Wt 82 6 kg (182 lb)   LMP 01/07/2019 (Approximate)   SpO2 98%   BMI 26 88 kg/m²          Physical Exam   Constitutional: She appears well-developed and well-nourished  No distress  HENT:   Head: Normocephalic and atraumatic  Right Ear: External ear normal    Left Ear: External ear normal    Nose: Nose normal    Mouth/Throat: No oropharyngeal exudate  Neck: Normal range of motion  Neck supple  No thyromegaly present  Cardiovascular: Normal rate and regular rhythm  Pulmonary/Chest: Effort normal and breath sounds normal  No respiratory distress  She has no wheezes  She has no rales  Abdominal: Soft  Bowel sounds are normal    Skin: She is not diaphoretic

## 2019-01-29 DIAGNOSIS — F32.A DEPRESSION, UNSPECIFIED DEPRESSION TYPE: ICD-10-CM

## 2019-01-29 RX ORDER — DULOXETIN HYDROCHLORIDE 60 MG/1
60 CAPSULE, DELAYED RELEASE ORAL DAILY
Qty: 30 CAPSULE | Refills: 0 | OUTPATIENT
Start: 2019-01-29

## 2019-02-08 DIAGNOSIS — F32.A DEPRESSION, UNSPECIFIED DEPRESSION TYPE: Primary | ICD-10-CM

## 2019-02-08 RX ORDER — DULOXETIN HYDROCHLORIDE 30 MG/1
30 CAPSULE, DELAYED RELEASE ORAL DAILY
Qty: 90 CAPSULE | Refills: 0 | Status: SHIPPED | OUTPATIENT
Start: 2019-02-08 | End: 2019-05-09 | Stop reason: HOSPADM

## 2019-03-26 ENCOUNTER — OFFICE VISIT (OUTPATIENT)
Dept: FAMILY MEDICINE CLINIC | Facility: CLINIC | Age: 29
End: 2019-03-26
Payer: COMMERCIAL

## 2019-03-26 VITALS
HEART RATE: 100 BPM | RESPIRATION RATE: 16 BRPM | TEMPERATURE: 99.2 F | BODY MASS INDEX: 26.07 KG/M2 | WEIGHT: 176 LBS | HEIGHT: 69 IN | SYSTOLIC BLOOD PRESSURE: 110 MMHG | OXYGEN SATURATION: 99 % | DIASTOLIC BLOOD PRESSURE: 76 MMHG

## 2019-03-26 DIAGNOSIS — E66.3 OVERWEIGHT: ICD-10-CM

## 2019-03-26 DIAGNOSIS — J02.9 SORE THROAT: Primary | ICD-10-CM

## 2019-03-26 DIAGNOSIS — J02.0 PHARYNGITIS DUE TO STREPTOCOCCUS SPECIES: ICD-10-CM

## 2019-03-26 LAB — S PYO AG THROAT QL: POSITIVE

## 2019-03-26 PROCEDURE — 87880 STREP A ASSAY W/OPTIC: CPT | Performed by: INTERNAL MEDICINE

## 2019-03-26 PROCEDURE — 3008F BODY MASS INDEX DOCD: CPT | Performed by: INTERNAL MEDICINE

## 2019-03-26 PROCEDURE — 1036F TOBACCO NON-USER: CPT | Performed by: INTERNAL MEDICINE

## 2019-03-26 PROCEDURE — 99213 OFFICE O/P EST LOW 20 MIN: CPT | Performed by: INTERNAL MEDICINE

## 2019-03-26 RX ORDER — CLARITHROMYCIN 500 MG/1
500 TABLET, COATED ORAL EVERY 12 HOURS SCHEDULED
Qty: 20 TABLET | Refills: 0 | Status: SHIPPED | OUTPATIENT
Start: 2019-03-26 | End: 2019-04-05

## 2019-03-26 NOTE — PROGRESS NOTES
Assessment/Plan:         Diagnoses and all orders for this visit:    Sore throat  -     POCT rapid strepA    Pharyngitis due to Streptococcus species  -     clarithromycin (BIAXIN) 500 mg tablet; Take 1 tablet (500 mg total) by mouth every 12 (twelve) hours for 10 days  -     CBC; Future    Overweight  -     Comprehensive metabolic panel; Future  -     Lipid panel; Future  -     TSH, 3rd generation with Free T4 reflex; Future          Subjective:      Patient ID: Caterina Potter is a 34 y o  female  Patient complains of feeling ill she states that she has had 4-positive chills 5 major illnesses in the last 6 months  Sore throat myalgias positive for headache loose bowel movements   +chills  Worried that something is the matter  Denies weight loss  +strep      The following portions of the patient's history were reviewed and updated as appropriate: She  has a past medical history of Abnormal weight gain, Anxiety, and Depression  She   Patient Active Problem List    Diagnosis Date Noted    Anxiety 12/09/2016    Celiac disease 11/30/2016    Depression 07/07/2015     She  has a past surgical history that includes Back surgery; pr colonoscopy flx dx w/collj spec when pfrmd (N/A, 7/28/2016); and Lumbar discectomy (2012)  Her family history includes Depression in her brother and mother; Diabetes in her father; Heart attack in her paternal grandmother; Hypertension in her father; Stroke in her maternal grandmother  She  reports that she has never smoked  She has never used smokeless tobacco  She reports that she drinks alcohol  She reports that she does not use drugs    Current Outpatient Medications   Medication Sig Dispense Refill    ALPRAZolam (XANAX) 0 25 mg tablet Take 1 tablet (0 25 mg total) by mouth 3 (three) times a day as needed for anxiety One tab po q 8 hr prn anxiety 30 tablet 0    DAYSEE 0 15-0 03 &0 01 MG TABS Take 1 tablet by mouth daily  4    clarithromycin (BIAXIN) 500 mg tablet Take 1 tablet (500 mg total) by mouth every 12 (twelve) hours for 10 days 20 tablet 0    DULoxetine (CYMBALTA) 30 mg delayed release capsule Take 1 capsule (30 mg total) by mouth daily (Patient not taking: Reported on 3/26/2019) 90 capsule 0    DULoxetine (CYMBALTA) 60 mg delayed release capsule Take 1 capsule (60 mg total) by mouth daily Take in addition to 30mg/day(total of 90mg daily) (Patient not taking: Reported on 3/26/2019) 30 capsule 0     No current facility-administered medications for this visit  Current Outpatient Medications on File Prior to Visit   Medication Sig    ALPRAZolam (XANAX) 0 25 mg tablet Take 1 tablet (0 25 mg total) by mouth 3 (three) times a day as needed for anxiety One tab po q 8 hr prn anxiety    DAYSEE 0 15-0 03 &0 01 MG TABS Take 1 tablet by mouth daily    DULoxetine (CYMBALTA) 30 mg delayed release capsule Take 1 capsule (30 mg total) by mouth daily (Patient not taking: Reported on 3/26/2019)    DULoxetine (CYMBALTA) 60 mg delayed release capsule Take 1 capsule (60 mg total) by mouth daily Take in addition to 30mg/day(total of 90mg daily) (Patient not taking: Reported on 3/26/2019)     No current facility-administered medications on file prior to visit  She is allergic to amoxicillin; gluten meal; and hyoscyamine       Review of Systems   Constitutional: Positive for chills  Negative for fever  HENT: Positive for sore throat  Negative for congestion and postnasal drip  Respiratory: Negative for cough  Musculoskeletal: Positive for myalgias  Neurological: Positive for headaches  Objective:      /76 (BP Location: Right arm, Patient Position: Sitting, Cuff Size: Large)   Pulse 100   Temp 99 2 °F (37 3 °C) (Tympanic)   Resp 16   Ht 5' 9" (1 753 m)   Wt 79 8 kg (176 lb)   LMP 01/15/2019 (Approximate)   SpO2 99%   BMI 25 99 kg/m²          Physical Exam   Constitutional: She appears well-developed and well-nourished  No distress     HENT:   Head: Normocephalic and atraumatic  Right Ear: External ear normal    Left Ear: External ear normal    Nose: Nose normal    Mouth/Throat: Oropharynx is clear and moist  No oropharyngeal exudate  Neck: Normal range of motion  Neck supple  No thyromegaly present  Cardiovascular: Normal rate, regular rhythm, normal heart sounds and intact distal pulses  Exam reveals no gallop and no friction rub  No murmur heard  Pulmonary/Chest: Effort normal and breath sounds normal  No respiratory distress  She has no wheezes  She has no rales  She exhibits no tenderness  Abdominal: Soft  Bowel sounds are normal  She exhibits no distension and no mass  There is no tenderness  There is no rebound and no guarding  Lymphadenopathy:     She has no cervical adenopathy  Skin: She is not diaphoretic

## 2019-05-09 ENCOUNTER — OFFICE VISIT (OUTPATIENT)
Dept: FAMILY MEDICINE CLINIC | Facility: CLINIC | Age: 29
End: 2019-05-09
Payer: COMMERCIAL

## 2019-05-09 VITALS
TEMPERATURE: 97.2 F | BODY MASS INDEX: 25.53 KG/M2 | OXYGEN SATURATION: 98 % | HEIGHT: 69 IN | HEART RATE: 82 BPM | DIASTOLIC BLOOD PRESSURE: 74 MMHG | SYSTOLIC BLOOD PRESSURE: 112 MMHG | WEIGHT: 172.4 LBS | RESPIRATION RATE: 16 BRPM

## 2019-05-09 DIAGNOSIS — F41.9 ANXIETY: Primary | ICD-10-CM

## 2019-05-09 DIAGNOSIS — F42.2 MIXED OBSESSIONAL THOUGHTS AND ACTS: ICD-10-CM

## 2019-05-09 DIAGNOSIS — F33.0 MILD EPISODE OF RECURRENT MAJOR DEPRESSIVE DISORDER (HCC): ICD-10-CM

## 2019-05-09 PROCEDURE — 99214 OFFICE O/P EST MOD 30 MIN: CPT | Performed by: FAMILY MEDICINE

## 2019-05-31 DIAGNOSIS — F41.9 ANXIETY: ICD-10-CM

## 2019-05-31 DIAGNOSIS — F42.2 MIXED OBSESSIONAL THOUGHTS AND ACTS: ICD-10-CM

## 2019-05-31 DIAGNOSIS — F33.0 MILD EPISODE OF RECURRENT MAJOR DEPRESSIVE DISORDER (HCC): ICD-10-CM

## 2019-06-13 ENCOUNTER — OFFICE VISIT (OUTPATIENT)
Dept: FAMILY MEDICINE CLINIC | Facility: CLINIC | Age: 29
End: 2019-06-13
Payer: COMMERCIAL

## 2019-06-13 VITALS
WEIGHT: 169.4 LBS | TEMPERATURE: 97.6 F | RESPIRATION RATE: 16 BRPM | DIASTOLIC BLOOD PRESSURE: 70 MMHG | BODY MASS INDEX: 25.09 KG/M2 | HEART RATE: 76 BPM | OXYGEN SATURATION: 97 % | SYSTOLIC BLOOD PRESSURE: 106 MMHG | HEIGHT: 69 IN

## 2019-06-13 DIAGNOSIS — F33.0 MILD EPISODE OF RECURRENT MAJOR DEPRESSIVE DISORDER (HCC): ICD-10-CM

## 2019-06-13 DIAGNOSIS — F42.2 MIXED OBSESSIONAL THOUGHTS AND ACTS: ICD-10-CM

## 2019-06-13 DIAGNOSIS — F41.9 ANXIETY: ICD-10-CM

## 2019-06-13 PROCEDURE — 3008F BODY MASS INDEX DOCD: CPT | Performed by: FAMILY MEDICINE

## 2019-06-13 PROCEDURE — 1036F TOBACCO NON-USER: CPT | Performed by: FAMILY MEDICINE

## 2019-06-13 PROCEDURE — 99213 OFFICE O/P EST LOW 20 MIN: CPT | Performed by: FAMILY MEDICINE

## 2019-08-16 ENCOUNTER — OFFICE VISIT (OUTPATIENT)
Dept: URGENT CARE | Facility: MEDICAL CENTER | Age: 29
End: 2019-08-16
Payer: COMMERCIAL

## 2019-08-16 VITALS
BODY MASS INDEX: 25.18 KG/M2 | WEIGHT: 170 LBS | TEMPERATURE: 98.5 F | RESPIRATION RATE: 20 BRPM | HEART RATE: 83 BPM | OXYGEN SATURATION: 100 % | HEIGHT: 69 IN

## 2019-08-16 DIAGNOSIS — T20.10XA SUPERFICIAL BURN OF FACE, INITIAL ENCOUNTER: Primary | ICD-10-CM

## 2019-08-16 PROCEDURE — 99213 OFFICE O/P EST LOW 20 MIN: CPT | Performed by: PHYSICIAN ASSISTANT

## 2019-08-16 PROCEDURE — 96372 THER/PROPH/DIAG INJ SC/IM: CPT | Performed by: PHYSICIAN ASSISTANT

## 2019-08-16 RX ORDER — METHYLPREDNISOLONE SODIUM SUCCINATE 40 MG/ML
40 INJECTION, POWDER, LYOPHILIZED, FOR SOLUTION INTRAMUSCULAR; INTRAVENOUS ONCE
Status: COMPLETED | OUTPATIENT
Start: 2019-08-16 | End: 2019-08-16

## 2019-08-16 RX ORDER — PREDNISONE 20 MG/1
40 TABLET ORAL DAILY
Qty: 10 TABLET | Refills: 0 | Status: SHIPPED | OUTPATIENT
Start: 2019-08-16 | End: 2019-08-21

## 2019-08-16 RX ADMIN — METHYLPREDNISOLONE SODIUM SUCCINATE 40 MG: 40 INJECTION, POWDER, LYOPHILIZED, FOR SOLUTION INTRAMUSCULAR; INTRAVENOUS at 21:09

## 2019-08-17 ENCOUNTER — HOSPITAL ENCOUNTER (EMERGENCY)
Facility: HOSPITAL | Age: 29
Discharge: HOME | End: 2019-08-17
Attending: EMERGENCY MEDICINE
Payer: COMMERCIAL

## 2019-08-17 VITALS
DIASTOLIC BLOOD PRESSURE: 82 MMHG | RESPIRATION RATE: 16 BRPM | SYSTOLIC BLOOD PRESSURE: 127 MMHG | TEMPERATURE: 98.2 F | HEART RATE: 87 BPM | OXYGEN SATURATION: 98 % | WEIGHT: 170 LBS | BODY MASS INDEX: 25.1 KG/M2

## 2019-08-17 DIAGNOSIS — T20.00XA: Primary | ICD-10-CM

## 2019-08-17 DIAGNOSIS — L24.1 IRRITANT CONTACT DERMATITIS DUE TO OILS: ICD-10-CM

## 2019-08-17 PROCEDURE — 99283 EMERGENCY DEPT VISIT LOW MDM: CPT | Performed by: EMERGENCY MEDICINE

## 2019-08-17 PROCEDURE — 99282 EMERGENCY DEPT VISIT SF MDM: CPT

## 2019-08-17 RX ORDER — GINSENG 100 MG
1 CAPSULE ORAL 2 TIMES DAILY
Qty: 15 G | Refills: 0 | Status: SHIPPED | OUTPATIENT
Start: 2019-08-17 | End: 2020-07-23 | Stop reason: ALTCHOICE

## 2019-08-17 NOTE — PROGRESS NOTES
[facility]        NAME: Jerry Thomas is a 34 y o  female  : 1990    MRN: [id number]  DATE: 2019  TIME: 8:51 PM    Assessment and Plan   Superficial burn of face, initial encounter [T20 10XA]  1  Superficial burn of face, initial encounter  methylPREDNISolone sodium succinate (Solu-MEDROL) injection 40 mg    predniSONE 20 mg tablet         Patient Instructions     Benadryl as directed  Cool compresses to affected areas  Apply Aloe and Vitamin E oil to promote healing  If swelling persists tomorrow, start the steroid prescription  Follow up with PCP in 3-5 days  Proceed to  ER if symptoms worsen  Chief Complaint     Chief Complaint   Patient presents with    Facial Burn     Pt put Oil of Oregano on her face 30 minutes ago  Now, pt states her face is burning and red  History of Present Illness       Patient is a 77-year-old female presenting for a burn on her face that occurred just before coming here  She applied oregano oil to her face and then felt immediate burning  She washed off the oral for right away with soapy water  There is still a burning sensation along with redness and swelling to the face  She reports a beautician advised she applied the oil to help with her skin however she did not know she was supposed to dilute the oil prior to applying it  Patient is concerned about scarring and she has never burning before  Review of Systems   Review of Systems   Constitutional: Negative for chills and fever  Respiratory: Negative for shortness of breath  Cardiovascular: Negative for chest pain  Skin: Positive for color change           Current Medications       Current Outpatient Medications:     ALPRAZolam (XANAX) 0 25 mg tablet, Take 1 tablet (0 25 mg total) by mouth 3 (three) times a day as needed for anxiety One tab po q 8 hr prn anxiety, Disp: 30 tablet, Rfl: 0    DAYSEE 0 15-0 03 &0 01 MG TABS, Take 1 tablet by mouth daily, Disp: , Rfl: 4    sertraline (ZOLOFT) 50 mg tablet, Take 1 tablet (50 mg total) by mouth daily, Disp: 90 tablet, Rfl: 0    predniSONE 20 mg tablet, Take 2 tablets (40 mg total) by mouth daily for 5 days, Disp: 10 tablet, Rfl: 0    Current Facility-Administered Medications:     methylPREDNISolone sodium succinate (Solu-MEDROL) injection 40 mg, 40 mg, Intramuscular, Once, Woodrow Rodrigues PA-C    Current Allergies     Allergies as of 08/16/2019 - Reviewed 08/16/2019   Allergen Reaction Noted    Amoxicillin Rash 03/05/2015    Gluten meal GI Intolerance 06/28/2017    Hyoscyamine Hallucinations 07/21/2016            The following portions of the patient's history were reviewed and updated as appropriate: allergies, current medications, past family history, past medical history, past social history, past surgical history and problem list      Past Medical History:   Diagnosis Date    Abnormal weight gain     last assessed: 11/30/2016    Anxiety     last assessed: 12/09/2016    Depression        Past Surgical History:   Procedure Laterality Date    BACK SURGERY      discectomy L5-S1; last assessed: 07/07/2015    [facility] L[facility]    VT COLONOSCOPY FLX DX W/COLLJ [facility] WHEN PFRMD N/A 7/28/2016    Procedure: COLONOSCOPY;  Surgeon: Gunjan Valenzuela MD;  [facility]; Service: Gastroenterology       Family History   Problem Relation Age of Onset    Depression Mother     Hypertension Father     Diabetes Father     Depression Brother     Stroke Maternal Grandmother     Heart attack Paternal Grandmother          Medications have been verified  Objective   Pulse 83   Temp 98 5 °F (36 9 °C) (Oral)   Resp 20   Ht 5' 9" (1 753 m)   Wt 77 1 kg (170 lb)   LMP 07/20/2019 (Exact Date)   SpO2 100%   BMI 25 10 kg/m²        Physical Exam     Physical Exam   Constitutional: She appears well-developed and well-nourished  No distress  HENT:   Head: Normocephalic and atraumatic  Eyes: Conjunctivae are normal    Pulmonary/Chest: Effort normal    Neurological: She is alert  Skin: She is not diaphoretic  Blotchy areas of mild erythema spread over face and neck  Slight swelling present underneath the left eye  Erythematous areas are slightly warm  Most of the areas feel cold however as she has been applying ice  There is no blistering

## 2019-08-17 NOTE — ED PROVIDER NOTES
History  Chief Complaint   Patient presents with    Facial Burn     applied oil of oregano to her face as advised by her  for acne  Has burns to her face from the application  81-year-old female presents to the emergency department for evaluation of facial pain  Patient states yesterday evening she put oil of oregano on her face  The patient had a facial at a local spa and her  recommended that she apply the oil of oregano to her face to treat acne  The patient purchased the oil and placed a her face last night and immediately developed a burning sensation  She washed her face with soap and water and her face continued to burn  She was evaluated at Urgent Care and was given a 40 mg injection of Solu-Medrol and a prescription for prednisone  She was instructed to apply aloe and vitamin E oil to the face for comfort  Patient presents this morning due to increased pain and swelling with redness and discoloration of the skin surrounding the eyes  Patient appears to have a 1st degree burn of the skin of the face around the eyes nose and neck  She has no respiratory distress  History provided by:  Patient and medical records   used: No    Burn   Burn location:  Head/neck and face  Facial burn location:  Face, L eyelid, R eyelid, L eyebrow and forehead  Burn quality:  Painful and red  Time since incident:  12 hours  Progression:  Worsening  Pain details:     Severity:  Moderate    Timing:  Constant    Progression:  Unchanged  Mechanism of burn:  Chemical  Incident location:  Home  Relieved by:  Nothing  Worsened by: Tactile pressure  Ineffective treatments:  Cold compresses  Associated symptoms: no difficulty swallowing, no eye pain, no nasal burns and no shortness of breath        Prior to Admission Medications   Prescriptions Last Dose Informant Patient Reported? Taking?    ALPRAZolam (XANAX) 0 25 mg tablet  Self No No   Sig: Take 1 tablet (0 25 mg total) by mouth 3 (three) times a day as needed for anxiety One tab po q 8 hr prn anxiety   DAYSEE 0 15-0 03 &0 01 MG TABS  Self Yes No   Sig: Take 1 tablet by mouth daily   predniSONE 20 mg tablet   No No   Sig: Take 2 tablets (40 mg total) by mouth daily for 5 days   sertraline (ZOLOFT) 50 mg tablet  Self No No   Sig: Take 1 tablet (50 mg total) by mouth daily      Facility-Administered Medications Last Administration Doses Remaining   methylPREDNISolone sodium succinate (Solu-MEDROL) injection 40 mg 8/16/2019  9:09 PM 0          Past Medical History:   Diagnosis Date    Abnormal weight gain     last assessed: 11/30/2016    Anxiety     last assessed: 12/09/2016    Depression        Past Surgical History:   Procedure Laterality Date    BACK SURGERY      discectomy L5-S1; last assessed: 07/07/2015    LUMBAR DISCECTOMY  2012    [facility] @ L5/S1    NV COLONOSCOPY FLX DX W/COLLJ [facility] WHEN PFRMD N/A 7/28/2016    Procedure: COLONOSCOPY;  Surgeon: Gunjan Valenzuela MD;  Location: [facility]; Service: Gastroenterology       Family History   Problem Relation Age of Onset    Depression Mother     Hypertension Father     Diabetes Father     Depression Brother     Stroke Maternal Grandmother     Heart attack Paternal Grandmother      I have reviewed and agree with the history as documented  Social History     Tobacco Use    Smoking status: Never Smoker    Smokeless tobacco: Never Used   Substance Use Topics    Alcohol use: Yes     Comment: socially (does not drink alcohol-as per [employer])    Drug use: No        Review of Systems   Constitutional: Negative for appetite change and fever  HENT: Negative for trouble swallowing  Eyes: Negative for pain  Respiratory: Negative for shortness of breath  Gastrointestinal: Negative for nausea and vomiting  Genitourinary: Negative for dysuria  Skin: Positive for color change  All other systems reviewed and are negative        Physical Exam  Physical Exam   Constitutional: She is oriented to person, place, and time  She appears well-developed and well-nourished  No distress  HENT:   Head: Normocephalic and atraumatic  Right Ear: External ear and ear canal normal    Left Ear: External ear and ear canal normal    Nose: Nose normal  No sinus tenderness or nasal deformity  Right sinus exhibits no maxillary sinus tenderness and no frontal sinus tenderness  Left sinus exhibits no maxillary sinus tenderness and no frontal sinus tenderness  Mouth/Throat: Uvula is midline, oropharynx is clear and moist and mucous membranes are normal  Normal dentition  No oropharyngeal exudate, posterior oropharyngeal edema or posterior oropharyngeal erythema  Eyes: Pupils are equal, round, and reactive to light  EOM are normal  Right eye exhibits no discharge  Left eye exhibits no discharge  No scleral icterus  Neck: Normal range of motion  Neck supple  Pulmonary/Chest: Effort normal  No respiratory distress  She has no wheezes  Musculoskeletal: Normal range of motion  She exhibits no deformity  Neurological: She is alert and oriented to person, place, and time  Skin: Skin is warm and dry  Burn noted  No bruising and no petechiae noted  Rash is not vesicular  There is erythema  There are several patches of well demarcated erythema with swelling and tenderness of the upper face sparing the mouth, jaw and nose  There are patches of erythema along the left anterior neck better tender to palpation  Findings consistent with 1st degree burn   Psychiatric: She has a normal mood and affect  Nursing note and vitals reviewed        Vital Signs  ED Triage Vitals [08/17/19 0819]   Temperature Pulse Respirations Blood Pressure SpO2   98 2 °F (36 8 °C) 87 16 127/82 98 %      Temp Source Heart Rate Source Patient Position - Orthostatic VS BP Location FiO2 (%)   Oral Monitor Sitting Right arm --      Pain Score       --           Vitals:    08/17/19 0819   BP: 127/82 Pulse: 87   Patient Position - Orthostatic VS: Sitting         Visual Acuity      ED Medications  Medications - No data to display    Diagnostic Studies  Results Reviewed     None                 No orders to display              Procedures  Procedures       ED Course                               MDM  Number of Diagnoses or Management Options  Face burns: new and requires workup  Irritant contact dermatitis due to oils: new and requires workup     Amount and/or Complexity of Data Reviewed  Decide to obtain previous medical records or to obtain history from someone other than the patient: yes  Obtain history from someone other than the patient: yes  Independent visualization of images, tracings, or specimens: yes    Risk of Complications, Morbidity, and/or Mortality  General comments: 56-year-old female presents with irritant dermatitis after placing oil of oregano on her face  Patient presents with 1st degree burns to the face, I did discuss that she may develop superficial 2nd degree burns has been less than 12 hours since the application of the will  Recommend keeping the skin clean and dry  Will recommend ice for comfort, topical bacitracin  Patient was given prednisone at the urgent care to take for inflammation  Recommend that she follow up with Dermatology  I did encourage her to use sunscreen when her skin heels but for now she should try to avoid all sun exposure if possible      Patient Progress  Patient progress: stable      Disposition  Final diagnoses:   Face burns   Irritant contact dermatitis due to oils     Time reflects when diagnosis was documented in both MDM as applicable and the Disposition within this note     Time User Action Codes Description Comment    8/17/2019  8:31 AM Windy Durbin Add [T20 00XA] Face burns     8/17/2019  8:35 AM Windy Durbin Add [L24 1] Irritant contact dermatitis due to oils       ED Disposition     ED Disposition Condition Date/Time Comment    Discharge Stable Sat Aug 17, 2019  8:30 AM Rito Taveras discharge to home/self care  Follow-up Information     Follow up With Specialties Details Why Contact Info Additional [address] [facility]   [place] [id number]  [facility] [address] , [place], [address]          Discharge Medication List as of 8/17/2019  8:38 AM      START taking these medications    Details   bacitracin topical ointment 500 units/g topical ointment Apply 1 large application topically 2 (two) times a day, Starting Sat 8/17/2019, Normal         CONTINUE these medications which have NOT CHANGED    Details   ALPRAZolam (XANAX) 0 25 mg tablet Take 1 tablet (0 25 mg total) by mouth 3 (three) times a day as needed for anxiety One tab po q 8 hr prn anxiety, Starting Fri 12/14/2018, Normal      DAYSEE 0 15-0 03 &0 01 MG TABS Take 1 tablet by mouth daily, Starting Sat 10/6/2018, Historical Med      predniSONE 20 mg tablet Take 2 tablets (40 mg total) by mouth daily for 5 days, Starting Fri 8/16/2019, Until Wed 8/21/2019, Normal      sertraline (ZOLOFT) 50 mg tablet Take 1 tablet (50 mg total) by mouth daily, Starting Thu 6/13/2019, Normal           No discharge procedures on file      ED Provider  Electronically Signed by           Madelyn Hassan DO  08/17/19 [id number]

## 2019-08-17 NOTE — PATIENT INSTRUCTIONS
Benadryl as directed  Cool compresses to affected areas  Apply Aloe and Vitamin E oil to promote healing  If swelling persists tomorrow, start the steroid prescription  Follow up with PCP in 3-5 days  Proceed to  ER if symptoms worsen

## 2019-10-28 ENCOUNTER — OFFICE VISIT (OUTPATIENT)
Dept: FAMILY MEDICINE CLINIC | Facility: CLINIC | Age: 29
End: 2019-10-28
Payer: COMMERCIAL

## 2019-10-28 VITALS
TEMPERATURE: 97.5 F | RESPIRATION RATE: 16 BRPM | HEIGHT: 69 IN | SYSTOLIC BLOOD PRESSURE: 126 MMHG | OXYGEN SATURATION: 97 % | DIASTOLIC BLOOD PRESSURE: 84 MMHG | HEART RATE: 87 BPM | BODY MASS INDEX: 25.62 KG/M2 | WEIGHT: 173 LBS

## 2019-10-28 DIAGNOSIS — F42.2 MIXED OBSESSIONAL THOUGHTS AND ACTS: ICD-10-CM

## 2019-10-28 DIAGNOSIS — F41.9 ANXIETY: ICD-10-CM

## 2019-10-28 DIAGNOSIS — F33.0 MILD EPISODE OF RECURRENT MAJOR DEPRESSIVE DISORDER (HCC): ICD-10-CM

## 2019-10-28 DIAGNOSIS — L70.0 ACNE VULGARIS: ICD-10-CM

## 2019-10-28 DIAGNOSIS — Z23 ENCOUNTER FOR IMMUNIZATION: Primary | ICD-10-CM

## 2019-10-28 PROCEDURE — 90686 IIV4 VACC NO PRSV 0.5 ML IM: CPT | Performed by: FAMILY MEDICINE

## 2019-10-28 PROCEDURE — 90471 IMMUNIZATION ADMIN: CPT | Performed by: FAMILY MEDICINE

## 2019-10-28 PROCEDURE — 3008F BODY MASS INDEX DOCD: CPT | Performed by: FAMILY MEDICINE

## 2019-10-28 PROCEDURE — 99214 OFFICE O/P EST MOD 30 MIN: CPT | Performed by: FAMILY MEDICINE

## 2019-10-28 RX ORDER — ALPRAZOLAM 0.25 MG/1
0.25 TABLET ORAL 3 TIMES DAILY PRN
Qty: 30 TABLET | Refills: 0 | Status: SHIPPED | OUTPATIENT
Start: 2019-10-28 | End: 2020-07-23 | Stop reason: SDUPTHER

## 2019-10-28 NOTE — PROGRESS NOTES
Assessment/Plan:         Diagnoses and all orders for this visit:    Encounter for immunization  -     influenza vaccine, [id number], quadrivalent, 0 5 mL, preservative-free, for adult and pediatric patients 6 mos+ (AFLURIA, FLUARIX, FLULAVAL, FLUZONE)    Mixed obsessional thoughts and acts  Comments:  cont the sertraline alternating doses if that feels the best  Orders:  -     sertraline (ZOLOFT) 50 mg tablet; Take 1 tablet (50 mg total) by mouth daily    Anxiety  Comments:  panic disorder, looking into CBT multidisciplinary program  Orders:  -     ALPRAZolam (XANAX) 0 25 mg tablet; Take 1 tablet (0 25 mg total) by mouth 3 (three) times a day as needed for anxiety One tab po q 8 hr prn anxiety  -     sertraline (ZOLOFT) 50 mg tablet; Take 1 tablet (50 mg total) by mouth daily    Mild episode of recurrent major depressive disorder (HCC)  -     ALPRAZolam (XANAX) 0 25 mg tablet; Take 1 tablet (0 25 mg total) by mouth 3 (three) times a day as needed for anxiety One tab po q 8 hr prn anxiety  -     sertraline (ZOLOFT) 50 mg tablet; Take 1 tablet (50 mg total) by mouth daily    Anxiety  -     ALPRAZolam (XANAX) 0 25 mg tablet; Take 1 tablet (0 25 mg total) by mouth 3 (three) times a day as needed for anxiety One tab po q 8 hr prn anxiety  -     sertraline (ZOLOFT) 50 mg tablet; Take 1 tablet (50 mg total) by mouth daily    Mixed obsessional thoughts and acts  Comments:  much improved  Orders:  -     sertraline (ZOLOFT) 50 mg tablet; Take 1 tablet (50 mg total) by mouth daily    Acne vulgaris  Comments:  related to OCP? not likely sertraline, minocin no help, or keeping it at bay? Subjective:      Patient ID: Leon Grace is a 34 y o  female  A month after starting the sertraline, noted increased cysts on the face, has seen derm  Switched to buspar, acne got better but the anxiety and panic got worse  Pt switched back to sertraline and stopped the buspar  Cysts have not come back   50mg is better then 25mg, only the cysts were the complication  Has seen a derm who can burn off the cysts prn, still unclear if it was related to the sertraline  Panic on the buspar and OCD was similar to before she was on the sertraline  Looking into CBT  Pt compulsively exercising for stress relief, not necessarily for weight loss  Purging due to chronic belly pain, now doesn't wasn't to eat bc belly pain  Has celiac, aggravated by stress? Has not tried PPI  Provo food ok, but anything savory or heavy upsets her stomach  Back on sertaline 25, 50mg alternating days  Could the OCP be related to the facial breakouts? More likely than the sertraline  Never had acne as a teen, only now in her 21's  The following portions of the patient's history were reviewed and updated as appropriate: allergies, current medications, past family history, past medical history, past social history, past surgical history and problem list     Review of Systems   Constitutional: Negative for fatigue  Respiratory: Positive for shortness of breath (during anxiety)  Negative for chest tightness  Cardiovascular: Positive for chest pain (during anxiety) and palpitations  Psychiatric/Behavioral: Positive for agitation and dysphoric mood  Negative for decreased concentration, hallucinations, self-injury and sleep disturbance  The patient is nervous/anxious  Objective:      /84 (BP Location: Right arm, Patient Position: Sitting, Cuff Size: Standard)   Pulse 87   Temp 97 5 °F (36 4 °C) (Tympanic)   Resp 16   Ht 5' 9" (1 753 m)   Wt 78 5 kg (173 lb)   LMP 09/07/2019 (Exact Date)   SpO2 97%   BMI 25 55 kg/m²          Physical Exam   Constitutional: She is oriented to person, place, and time  She appears well-developed and well-nourished  Cardiovascular: Normal rate, regular rhythm and normal heart sounds  Pulmonary/Chest: Effort normal and breath sounds normal    Neurological: She is alert and oriented to person, place, and time  Skin: Skin is warm  Psychiatric: She has a normal mood and affect   Her behavior is normal  Judgment and thought content normal

## 2019-10-29 NOTE — PROGRESS NOTES
Chart updated per office request  Discrete reportable data documented      *Updated Paps, DOS  7-17-18 4-20-17 12-19-13

## 2020-03-25 DIAGNOSIS — F33.0 MILD EPISODE OF RECURRENT MAJOR DEPRESSIVE DISORDER (HCC): ICD-10-CM

## 2020-03-25 DIAGNOSIS — F41.9 ANXIETY: ICD-10-CM

## 2020-03-25 DIAGNOSIS — F42.2 MIXED OBSESSIONAL THOUGHTS AND ACTS: ICD-10-CM

## 2020-07-07 DIAGNOSIS — F42.2 MIXED OBSESSIONAL THOUGHTS AND ACTS: ICD-10-CM

## 2020-07-07 DIAGNOSIS — F33.0 MILD EPISODE OF RECURRENT MAJOR DEPRESSIVE DISORDER (HCC): ICD-10-CM

## 2020-07-07 DIAGNOSIS — F41.9 ANXIETY: ICD-10-CM

## 2020-07-23 ENCOUNTER — OFFICE VISIT (OUTPATIENT)
Dept: FAMILY MEDICINE CLINIC | Facility: CLINIC | Age: 30
End: 2020-07-23
Payer: COMMERCIAL

## 2020-07-23 VITALS
HEART RATE: 80 BPM | OXYGEN SATURATION: 98 % | BODY MASS INDEX: 25.3 KG/M2 | DIASTOLIC BLOOD PRESSURE: 80 MMHG | SYSTOLIC BLOOD PRESSURE: 122 MMHG | WEIGHT: 170.8 LBS | HEIGHT: 69 IN | TEMPERATURE: 97.8 F

## 2020-07-23 DIAGNOSIS — F42.2 MIXED OBSESSIONAL THOUGHTS AND ACTS: ICD-10-CM

## 2020-07-23 DIAGNOSIS — F33.0 MILD EPISODE OF RECURRENT MAJOR DEPRESSIVE DISORDER (HCC): ICD-10-CM

## 2020-07-23 DIAGNOSIS — F41.9 ANXIETY: ICD-10-CM

## 2020-07-23 PROCEDURE — 99214 OFFICE O/P EST MOD 30 MIN: CPT | Performed by: FAMILY MEDICINE

## 2020-07-23 PROCEDURE — 3008F BODY MASS INDEX DOCD: CPT | Performed by: FAMILY MEDICINE

## 2020-07-23 PROCEDURE — 1036F TOBACCO NON-USER: CPT | Performed by: FAMILY MEDICINE

## 2020-07-23 RX ORDER — SULFAMETHOXAZOLE AND TRIMETHOPRIM 800; 160 MG/1; MG/1
1 TABLET ORAL 2 TIMES DAILY
COMMUNITY
Start: 2020-05-18 | End: 2021-01-27

## 2020-07-23 RX ORDER — ALPRAZOLAM 0.25 MG/1
0.25 TABLET ORAL 3 TIMES DAILY PRN
Qty: 30 TABLET | Refills: 0 | Status: SHIPPED | OUTPATIENT
Start: 2020-07-23 | End: 2022-06-07

## 2020-07-23 NOTE — PROGRESS NOTES
Assessment/Plan:    1  Anxiety  -     sertraline (ZOLOFT) 50 mg tablet; Take 0 5 tablets (25 mg total) by mouth daily  -     ALPRAZolam (XANAX) 0 25 mg tablet; Take 1 tablet (0 25 mg total) by mouth 3 (three) times a day as needed for anxiety One tab po q 8 hr prn anxiety    2  Mild episode of recurrent major depressive disorder (HCC)  -     sertraline (ZOLOFT) 50 mg tablet; Take 0 5 tablets (25 mg total) by mouth daily  -     ALPRAZolam (XANAX) 0 25 mg tablet; Take 1 tablet (0 25 mg total) by mouth 3 (three) times a day as needed for anxiety One tab po q 8 hr prn anxiety    3  Mixed obsessional thoughts and acts  Comments:  much improved  Orders:  -     sertraline (ZOLOFT) 50 mg tablet; Take 0 5 tablets (25 mg total) by mouth daily        There are no Patient Instructions on file for this visit  Return in about 6 months (around 1/23/2021)  Subjective:      Patient ID: Karen Forrest is a 31 y o  female  Chief Complaint   Patient presents with    Follow-up       Doing well on zoloft 25mg  Completed a self-guided course of Cognitive Behavioral Therapy 12 week program in the fall  Sx notably an issue more so in the winter  Derm put her on oral abx (Bactrim) for the facial cysts, which is helping, and OB switched her OCP to one that is better for hormonal acne  Pt is a teacher, 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  The following portions of the patient's history were reviewed and updated as appropriate: allergies, current medications, past family history, past medical history, past social history, past surgical history and problem list     Review of Systems   Constitutional: Negative for fatigue and fever  HENT: Negative for congestion  Eyes: Negative for visual disturbance  Respiratory: Negative for chest tightness and shortness of breath  Cardiovascular: Negative for chest pain and palpitations  Gastrointestinal: Negative for abdominal pain     Genitourinary: Negative for difficulty urinating  Musculoskeletal: Negative for arthralgias  Neurological: Negative for headaches  Hematological: Does not bruise/bleed easily  Psychiatric/Behavioral: Negative for agitation, behavioral problems, decreased concentration, dysphoric mood, hallucinations, self-injury, sleep disturbance and suicidal ideas  The patient is nervous/anxious  The patient is not hyperactive  Current Outpatient Medications   Medication Sig Dispense Refill    ALPRAZolam (XANAX) 0 25 mg tablet Take 1 tablet (0 25 mg total) by mouth 3 (three) times a day as needed for anxiety One tab po q 8 hr prn anxiety 30 tablet 0    DAYSEE 0 15-0 03 &0 01 MG TABS Take 1 tablet by mouth daily  4    sertraline (ZOLOFT) 50 mg tablet Take 0 5 tablets (25 mg total) by mouth daily 90 tablet 0    sulfamethoxazole-trimethoprim (BACTRIM DS) 800-160 mg per tablet Take 1 tablet by mouth 2 (two) times a day       No current facility-administered medications for this visit  Objective:    /80 (BP Location: Right arm, Patient Position: Sitting, Cuff Size: Standard)   Pulse 80   Temp 97 8 °F (36 6 °C)   Ht 5' 9" (1 753 m)   Wt 77 5 kg (170 lb 12 8 oz)   SpO2 98%   BMI 25 22 kg/m²        Physical Exam   Constitutional: She is oriented to person, place, and time  She appears well-developed and well-nourished  Cardiovascular: Normal rate, regular rhythm and normal heart sounds  Pulmonary/Chest: Effort normal and breath sounds normal    Neurological: She is alert and oriented to person, place, and time  She has normal reflexes  Skin: Skin is warm  Psychiatric: She has a normal mood and affect  Her behavior is normal  Judgment and thought content normal    Vitals reviewed               Nadia Bermudez MD

## 2020-10-03 DIAGNOSIS — F41.9 ANXIETY: ICD-10-CM

## 2020-10-03 DIAGNOSIS — F42.2 MIXED OBSESSIONAL THOUGHTS AND ACTS: ICD-10-CM

## 2020-10-03 DIAGNOSIS — F33.0 MILD EPISODE OF RECURRENT MAJOR DEPRESSIVE DISORDER (HCC): ICD-10-CM

## 2021-01-04 DIAGNOSIS — F33.0 MILD EPISODE OF RECURRENT MAJOR DEPRESSIVE DISORDER (HCC): ICD-10-CM

## 2021-01-04 DIAGNOSIS — F42.2 MIXED OBSESSIONAL THOUGHTS AND ACTS: ICD-10-CM

## 2021-01-04 DIAGNOSIS — F41.9 ANXIETY: ICD-10-CM

## 2021-01-27 ENCOUNTER — OFFICE VISIT (OUTPATIENT)
Dept: FAMILY MEDICINE CLINIC | Facility: CLINIC | Age: 31
End: 2021-01-27
Payer: COMMERCIAL

## 2021-01-27 VITALS
OXYGEN SATURATION: 98 % | BODY MASS INDEX: 25.77 KG/M2 | TEMPERATURE: 97.1 F | DIASTOLIC BLOOD PRESSURE: 68 MMHG | HEIGHT: 69 IN | WEIGHT: 174 LBS | HEART RATE: 92 BPM | SYSTOLIC BLOOD PRESSURE: 112 MMHG

## 2021-01-27 DIAGNOSIS — L70.0 ACNE VULGARIS: ICD-10-CM

## 2021-01-27 DIAGNOSIS — F41.9 ANXIETY: Primary | ICD-10-CM

## 2021-01-27 DIAGNOSIS — F33.0 MILD EPISODE OF RECURRENT MAJOR DEPRESSIVE DISORDER (HCC): ICD-10-CM

## 2021-01-27 DIAGNOSIS — F42.2 MIXED OBSESSIONAL THOUGHTS AND ACTS: ICD-10-CM

## 2021-01-27 DIAGNOSIS — R10.2 PELVIC PAIN: ICD-10-CM

## 2021-01-27 PROCEDURE — 3725F SCREEN DEPRESSION PERFORMED: CPT | Performed by: FAMILY MEDICINE

## 2021-01-27 PROCEDURE — 99214 OFFICE O/P EST MOD 30 MIN: CPT | Performed by: FAMILY MEDICINE

## 2021-01-27 PROCEDURE — 3008F BODY MASS INDEX DOCD: CPT | Performed by: FAMILY MEDICINE

## 2021-01-27 PROCEDURE — 1036F TOBACCO NON-USER: CPT | Performed by: FAMILY MEDICINE

## 2021-01-27 NOTE — PROGRESS NOTES
Assessment/Plan:    1  Anxiety    2  Mild episode of recurrent major depressive disorder ([facility] [address] )    3  Mixed obsessional thoughts and acts  Comments:  much improved with CBT, zoloft    4  Acne vulgaris    5  Pelvic pain  Comments:  will f/u with GYN if sx worsened        There are no Patient Instructions on file for this visit  Return in about 6 months (around 7/27/2021)  Subjective:      Patient ID: Leon Grace is a 31 y o  female  Chief Complaint   Patient presents with    Follow-up       Here for med check for anxiety  Has been working throughout the pandemic  Had COVID end of November  Still having some SOB with exertion during workout  Boyfriend moved in around thanksgiving some some initial stress  Work has been stressful, teacher  Completed the COVID questionnaire completed  Has taken the xanax only once a month  Winter is more anxiety provoking than summer  Does go tanning a few times in the winter to help with seasonal affective type sx  Ovarian cyst dx'd in Nov,   H/o celiac  Had same pain this morning as if she was ovulating, but already on OCP  Pain lasted most of the day, she took naproxen  OCP was switched in December 2019 to help with acne  Skin has improved but still having the same pain, ovulation? Use condoms as a back up  Also notes some periods irregularities in her menses  The following portions of the patient's history were reviewed and updated as appropriate: allergies, current medications, past family history, past medical history, past social history, past surgical history and problem list     Review of Systems   Constitutional: Negative for fatigue and fever  HENT: Negative for congestion  Eyes: Negative for visual disturbance  Respiratory: Negative for chest tightness and shortness of breath  Cardiovascular: Negative for chest pain and palpitations  Gastrointestinal: Negative for abdominal pain  Genitourinary: Positive for pelvic pain (ovarian cyst )  Negative for difficulty urinating  Musculoskeletal: Negative for arthralgias  Neurological: Negative for headaches  Hematological: Does not bruise/bleed easily  Current Outpatient Medications   Medication Sig Dispense Refill    ALPRAZolam (XANAX) 0 25 mg tablet Take 1 tablet (0 25 mg total) by mouth 3 (three) times a day as needed for anxiety One tab po q 8 hr prn anxiety 30 tablet 0    DAYSEE 0 15-0 03 &0 01 MG TABS Take 1 tablet by mouth daily  4    sertraline (ZOLOFT) 50 mg tablet TAKE 1 TABLET BY MOUTH EVERY DAY 90 tablet 0     No current facility-administered medications for this visit  Objective:    /68 (BP Location: Right arm, Patient Position: Sitting, Cuff Size: Standard)   Pulse 92   Temp (!) 97 1 °F (36 2 °C)   Ht 5' 9" (1 753 m)   Wt 78 9 kg (174 lb)   SpO2 98%   BMI 25 70 kg/m²        Physical Exam  Vitals signs reviewed  Constitutional:       Appearance: She is well-developed  Cardiovascular:      Rate and Rhythm: Normal rate and regular rhythm  Heart sounds: Normal heart sounds  Pulmonary:      Effort: Pulmonary effort is normal       Breath sounds: Normal breath sounds  Comments: Diminished b/l  Skin:     General: Skin is warm  Neurological:      Mental Status: She is alert and oriented to person, place, and time  Psychiatric:         Behavior: Behavior normal          Thought Content: Thought content normal          Judgment: Judgment normal          BMI Counseling: Body mass index is 25 7 kg/m²  The BMI is above normal  Nutrition recommendations include 3-5 servings of fruits/vegetables daily, consuming healthier snacks and moderation in carbohydrate intake  Exercise recommendations include moderate aerobic physical activity for 150 minutes/week, exercising 3-5 times per week and strength training exercises         Fatou Mitchell MD

## 2021-03-02 ENCOUNTER — VBI (OUTPATIENT)
Dept: ADMINISTRATIVE | Facility: OTHER | Age: 31
End: 2021-03-02

## 2021-04-09 DIAGNOSIS — Z23 ENCOUNTER FOR IMMUNIZATION: ICD-10-CM

## 2021-07-21 ENCOUNTER — VBI (OUTPATIENT)
Dept: ADMINISTRATIVE | Facility: OTHER | Age: 31
End: 2021-07-21

## 2021-09-15 ENCOUNTER — APPOINTMENT (EMERGENCY)
Dept: ULTRASOUND IMAGING | Facility: HOSPITAL | Age: 31
End: 2021-09-15
Payer: COMMERCIAL

## 2021-09-15 ENCOUNTER — HOSPITAL ENCOUNTER (EMERGENCY)
Facility: HOSPITAL | Age: 31
Discharge: HOME | End: 2021-09-15
Attending: EMERGENCY MEDICINE
Payer: COMMERCIAL

## 2021-09-15 ENCOUNTER — APPOINTMENT (EMERGENCY)
Dept: CT IMAGING | Facility: HOSPITAL | Age: 31
End: 2021-09-15
Payer: COMMERCIAL

## 2021-09-15 VITALS
OXYGEN SATURATION: 100 % | WEIGHT: 169.8 LBS | RESPIRATION RATE: 20 BRPM | HEART RATE: 87 BPM | HEIGHT: 69 IN | DIASTOLIC BLOOD PRESSURE: 63 MMHG | BODY MASS INDEX: 25.15 KG/M2 | SYSTOLIC BLOOD PRESSURE: 117 MMHG | TEMPERATURE: 98.5 F

## 2021-09-15 DIAGNOSIS — R10.30 LOWER ABDOMINAL PAIN: Primary | ICD-10-CM

## 2021-09-15 LAB
ALBUMIN SERPL BCP-MCNC: 4.4 G/DL (ref 3.5–5)
ALP SERPL-CCNC: 54 U/L (ref 46–116)
ALT SERPL W P-5'-P-CCNC: 31 U/L (ref 12–78)
ANION GAP SERPL CALCULATED.3IONS-SCNC: 11 MMOL/L (ref 4–13)
AST SERPL W P-5'-P-CCNC: 20 U/L (ref 5–45)
BASOPHILS # BLD AUTO: 0.02 THOUSANDS/ΜL (ref 0–0.1)
BASOPHILS NFR BLD AUTO: 0 % (ref 0–1)
BILIRUB SERPL-MCNC: 0.43 MG/DL (ref 0.2–1)
BILIRUB UR QL STRIP: NEGATIVE
BUN SERPL-MCNC: 12 MG/DL (ref 5–25)
CALCIUM SERPL-MCNC: 9.4 MG/DL (ref 8.3–10.1)
CHLORIDE SERPL-SCNC: 106 MMOL/L (ref 100–108)
CLARITY UR: CLEAR
CO2 SERPL-SCNC: 24 MMOL/L (ref 21–32)
COLOR UR: YELLOW
CREAT SERPL-MCNC: 0.7 MG/DL (ref 0.6–1.3)
EOSINOPHIL # BLD AUTO: 0.04 THOUSAND/ΜL (ref 0–0.61)
EOSINOPHIL NFR BLD AUTO: 1 % (ref 0–6)
ERYTHROCYTE [DISTWIDTH] IN BLOOD BY AUTOMATED COUNT: 12.3 % (ref 11.6–15.1)
EXT PREG TEST URINE: NEGATIVE
EXT. CONTROL ED NAV: NORMAL
GFR SERPL CREATININE-BSD FRML MDRD: 117 ML/MIN/1.73SQ M
GLUCOSE SERPL-MCNC: 91 MG/DL (ref 65–140)
GLUCOSE UR STRIP-MCNC: NEGATIVE MG/DL
HCT VFR BLD AUTO: 42.2 % (ref 34.8–46.1)
HGB BLD-MCNC: 13.9 G/DL (ref 11.5–15.4)
HGB UR QL STRIP.AUTO: NEGATIVE
IMM GRANULOCYTES # BLD AUTO: 0.02 THOUSAND/UL (ref 0–0.2)
IMM GRANULOCYTES NFR BLD AUTO: 0 % (ref 0–2)
KETONES UR STRIP-MCNC: NEGATIVE MG/DL
LEUKOCYTE ESTERASE UR QL STRIP: NEGATIVE
LYMPHOCYTES # BLD AUTO: 2.07 THOUSANDS/ΜL (ref 0.6–4.47)
LYMPHOCYTES NFR BLD AUTO: 26 % (ref 14–44)
MCH RBC QN AUTO: 31.1 PG (ref 26.8–34.3)
MCHC RBC AUTO-ENTMCNC: 32.9 G/DL (ref 31.4–37.4)
MCV RBC AUTO: 94 FL (ref 82–98)
MONOCYTES # BLD AUTO: 0.47 THOUSAND/ΜL (ref 0.17–1.22)
MONOCYTES NFR BLD AUTO: 6 % (ref 4–12)
NEUTROPHILS # BLD AUTO: 5.51 THOUSANDS/ΜL (ref 1.85–7.62)
NEUTS SEG NFR BLD AUTO: 67 % (ref 43–75)
NITRITE UR QL STRIP: NEGATIVE
NRBC BLD AUTO-RTO: 0 /100 WBCS
PH UR STRIP.AUTO: 5.5 [PH] (ref 4.5–8)
PLATELET # BLD AUTO: 291 THOUSANDS/UL (ref 149–390)
PMV BLD AUTO: 10 FL (ref 8.9–12.7)
POTASSIUM SERPL-SCNC: 3.9 MMOL/L (ref 3.5–5.3)
PROT SERPL-MCNC: 7.9 G/DL (ref 6.4–8.2)
PROT UR STRIP-MCNC: NEGATIVE MG/DL
RBC # BLD AUTO: 4.47 MILLION/UL (ref 3.81–5.12)
SODIUM SERPL-SCNC: 141 MMOL/L (ref 136–145)
SP GR UR STRIP.AUTO: 1.02 (ref 1–1.03)
UROBILINOGEN UR QL STRIP.AUTO: 0.2 E.U./DL
WBC # BLD AUTO: 8.13 THOUSAND/UL (ref 4.31–10.16)

## 2021-09-15 PROCEDURE — 81025 URINE PREGNANCY TEST: CPT | Performed by: PHYSICIAN ASSISTANT

## 2021-09-15 PROCEDURE — 99284 EMERGENCY DEPT VISIT MOD MDM: CPT

## 2021-09-15 PROCEDURE — 80053 COMPREHEN METABOLIC PANEL: CPT | Performed by: PHYSICIAN ASSISTANT

## 2021-09-15 PROCEDURE — 74177 CT ABD & PELVIS W/CONTRAST: CPT

## 2021-09-15 PROCEDURE — 36415 COLL VENOUS BLD VENIPUNCTURE: CPT | Performed by: PHYSICIAN ASSISTANT

## 2021-09-15 PROCEDURE — 76856 US EXAM PELVIC COMPLETE: CPT

## 2021-09-15 PROCEDURE — 81003 URINALYSIS AUTO W/O SCOPE: CPT

## 2021-09-15 PROCEDURE — 85025 COMPLETE CBC W/AUTO DIFF WBC: CPT | Performed by: PHYSICIAN ASSISTANT

## 2021-09-15 PROCEDURE — 99284 EMERGENCY DEPT VISIT MOD MDM: CPT | Performed by: PHYSICIAN ASSISTANT

## 2021-09-15 PROCEDURE — 96374 THER/PROPH/DIAG INJ IV PUSH: CPT

## 2021-09-15 RX ORDER — KETOROLAC TROMETHAMINE 30 MG/ML
30 INJECTION, SOLUTION INTRAMUSCULAR; INTRAVENOUS ONCE
Status: COMPLETED | OUTPATIENT
Start: 2021-09-15 | End: 2021-09-15

## 2021-09-15 RX ADMIN — KETOROLAC TROMETHAMINE 30 MG: 30 INJECTION, SOLUTION INTRAMUSCULAR; INTRAVENOUS at 16:28

## 2021-09-15 RX ADMIN — IOHEXOL 100 ML: 350 INJECTION, SOLUTION INTRAVENOUS at 15:47

## 2021-09-15 NOTE — ED PROVIDER NOTES
History  Chief Complaint   Patient presents with    Abdominal Pain     Pt reports bilateral lower abdominal pain and chills, Diarhea, and nausea  Pt reports had eposides like this in july for about 1 week  This time it started about 3 days ago  hx of cileac  53-year-old female presents emergency department with complaints of abdominal pain  She has been having lower abdominal and pelvic pain over the past several days  Describes severe pain in the left lower side with associated nausea and intermittent diarrhea  Notes she had similar symptoms for approximately 1 week in July 4 spontaneously resolving  Has not been taking anything at home for pain at this time  States that she has been trying to conceive a child and she is unsure she is pregnant at this time and does not take any medications  She denies any urinary symptoms  No vaginal bleeding        History provided by:  Patient   used: No    Abdominal Pain  Pain location:  LLQ  Pain quality: cramping, sharp and stabbing    Pain radiates to:  LUQ  Pain severity:  Moderate  Onset quality:  Gradual  Duration:  1 week  Timing:  Intermittent  Progression:  Waxing and waning  Chronicity:  New  Context: not alcohol use, not awakening from sleep, not diet changes, not eating, not laxative use, not medication withdrawal, not previous surgeries, not recent illness, not recent sexual activity, not recent travel, not retching, not sick contacts, not suspicious food intake and not trauma    Relieved by:  Nothing  Ineffective treatments:  None tried  Associated symptoms: diarrhea    Associated symptoms: no anorexia, no belching, no chest pain, no chills, no constipation, no cough, no dysuria, no fatigue, no fever, no flatus, no hematemesis, no hematochezia, no hematuria, no melena, no nausea, no shortness of breath, no sore throat, no vaginal bleeding, no vaginal discharge and no vomiting        Prior to Admission Medications   Prescriptions Last Dose Informant Patient Reported? Taking? ALPRAZolam (XANAX) 0 25 mg tablet   No No   Sig: Take 1 tablet (0 25 mg total) by mouth 3 (three) times a day as needed for anxiety One tab po q 8 hr prn anxiety   DAYSEE 0 15-0 03 &0 01 MG TABS  Self Yes No   Sig: Take 1 tablet by mouth daily   sertraline (ZOLOFT) 50 mg tablet   No No   Sig: TAKE 1 TABLET BY MOUTH EVERY DAY      Facility-Administered Medications: None       Past Medical History:   Diagnosis Date    Abnormal weight gain     last assessed: 11/30/2016    Anxiety     last assessed: 12/09/2016    Depression        Past Surgical History:   Procedure Laterality Date    BACK SURGERY      discectomy L5-S1; last assessed: 07/07/2015    LUMBAR DISCECTOMY  2012    [facility] @ L5/S1    HI COLONOSCOPY FLX DX W[facility] WHEN PFRMD N/A 7/28/2016    Procedure: COLONOSCOPY;  Surgeon: Erin Timmons MD;  Location: [facility]; Service: Gastroenterology       Family History   Problem Relation Age of Onset    Depression Mother     Hypertension Father     Diabetes Father     Depression Brother     Stroke Maternal Grandmother     Heart attack Paternal Grandmother      I have reviewed and agree with the history as documented  E-Cigarette/Vaping    E-Cigarette Use Never User      E-Cigarette/Vaping Substances     Social History     Tobacco Use    Smoking status: Never Smoker    Smokeless tobacco: Never Used   Vaping Use    Vaping Use: Never used   Substance Use Topics    Alcohol use: Yes     Comment: socially (does not drink alcohol-as per [employer])    Drug use: No       Review of Systems   Constitutional: Negative for activity change, appetite change, chills, fatigue and fever  HENT: Negative for congestion, dental problem, drooling, ear discharge, ear pain, mouth sores, nosebleeds, rhinorrhea, sore throat and trouble swallowing  Eyes: Negative for pain, discharge and itching     Respiratory: Negative for cough, chest tightness, shortness of breath and wheezing  Cardiovascular: Negative for chest pain and palpitations  Gastrointestinal: Positive for abdominal pain and diarrhea  Negative for anorexia, blood in stool, constipation, flatus, hematemesis, hematochezia, melena, nausea and vomiting  Endocrine: Negative for cold intolerance and heat intolerance  Genitourinary: Negative for difficulty urinating, dysuria, flank pain, frequency, hematuria, urgency, vaginal bleeding and vaginal discharge  Skin: Negative for rash and wound  Allergic/Immunologic: Negative for food allergies and immunocompromised state  Neurological: Negative for dizziness, seizures, syncope, weakness, numbness and headaches  Psychiatric/Behavioral: Negative for agitation, behavioral problems and confusion  Physical Exam  Physical Exam  Vitals and nursing note reviewed  Constitutional:       General: She is not in acute distress  Appearance: She is not diaphoretic  HENT:      Head: Normocephalic and atraumatic  Right Ear: External ear normal       Left Ear: External ear normal    Eyes:      Conjunctiva/sclera: Conjunctivae normal    Neck:      Vascular: No JVD  Trachea: No tracheal deviation  Cardiovascular:      Rate and Rhythm: Normal rate and regular rhythm  Heart sounds: Normal heart sounds  No murmur heard  No friction rub  No gallop  Pulmonary:      Effort: No respiratory distress  Breath sounds: No wheezing or rales  Chest:      Chest wall: No tenderness  Abdominal:      General: Bowel sounds are normal  There is no distension  Palpations: Abdomen is soft  Tenderness: There is abdominal tenderness in the left upper quadrant and left lower quadrant  There is no guarding  Musculoskeletal:         General: No tenderness or deformity  Normal range of motion  Lymphadenopathy:      Cervical: No cervical adenopathy  Skin:     General: Skin is warm and dry  Findings: No erythema or rash  Neurological:      General: No focal deficit present  Mental Status: She is alert and oriented to person, place, and time     Psychiatric:         Mood and Affect: Mood normal          Behavior: Behavior normal          Vital Signs  ED Triage Vitals [09/15/21 1151]   Temperature Pulse Respirations Blood Pressure SpO2   98 5 °F (36 9 °C) 87 20 120/72 100 %      Temp Source Heart Rate Source Patient Position - Orthostatic VS BP Location FiO2 (%)   Oral Monitor Sitting Left arm --      Pain Score       9           Vitals:    09/15/21 1151 09/15/21 1604   BP: 120/72 117/63   Pulse: 87 81   Patient Position - Orthostatic VS: Sitting          Visual Acuity      ED Medications  Medications   iohexol (OMNIPAQUE) 350 MG/ML injection (SINGLE-DOSE) 100 mL (100 mL Intravenous Given 9/15/21 1547)   ketorolac (TORADOL) injection 30 mg (30 mg Intravenous Given 9/15/21 1628)       Diagnostic Studies  Results Reviewed     Procedure Component Value Units Date/Time    POCT pregnancy, urine [[id number]]  (Normal) Resulted: 09/15/21 1533    Lab Status: Final result Updated: 09/15/21 1533     EXT PREG TEST UR (Ref: Negative) negative     Control valid    Comprehensive metabolic panel [[id number]] Collected: 09/15/21 1337    Lab Status: Final result Specimen: Blood from Hand, Left Updated: 09/15/21 1425     Sodium 141 mmol/L      Potassium 3 9 mmol/L      Chloride 106 mmol/L      CO2 24 mmol/L      ANION GAP 11 mmol/L      BUN 12 mg/dL      Creatinine 0 70 mg/dL      Glucose 91 mg/dL      Calcium 9 4 mg/dL      AST 20 U/L      ALT 31 U/L      Alkaline Phosphatase 54 U/L      Total Protein 7 9 g/dL      Albumin 4 4 g/dL      Total Bilirubin 0 43 mg/dL      eGFR 117 ml/min/1 73sq m     Narrative:      Petty guidelines for Chronic Kidney Disease (CKD):     Stage 1 with normal or high GFR (GFR > 90 mL/min/1 73 square meters)    Stage 2 Mild CKD (GFR = 60-89 mL/min/1 73 square meters)    Stage 3A Moderate CKD (GFR = 45-59 mL/min/1 73 square meters)    Stage 3B Moderate CKD (GFR = 30-44 mL/min/1 73 square meters)    Stage 4 Severe CKD (GFR = 15-29 mL/min/1 73 square meters)    Stage 5 End Stage CKD (GFR <15 mL/min/1 73 square meters)  Note: GFR calculation is accurate only with a steady state creatinine    CBC and differential [[id number]] Collected: 09/15/21 1337    Lab Status: Final result Specimen: Blood from Hand, Left Updated: 09/15/21 1343     WBC 8 13 Thousand/uL      RBC 4 47 Million/uL      Hemoglobin 13 9 g/dL      Hematocrit 42 2 %      MCV 94 fL      MCH 31 1 pg      MCHC 32 9 g/dL      RDW 12 3 %      MPV 10 0 fL      Platelets 571 Thousands/uL      nRBC 0 /100 WBCs      Neutrophils Relative 67 %      Immat GRANS % 0 %      Lymphocytes Relative 26 %      Monocytes Relative 6 %      Eosinophils Relative 1 %      Basophils Relative 0 %      Neutrophils Absolute 5 51 Thousands/µL      Immature Grans Absolute 0 02 Thousand/uL      Lymphocytes Absolute 2 07 Thousands/µL      Monocytes Absolute 0 47 Thousand/µL      Eosinophils Absolute 0 04 Thousand/µL      Basophils Absolute 0 02 Thousands/µL     Urine Macroscopic, POC [[id number]] Collected: 09/15/21 1217    Lab Status: Final result Specimen: Urine Updated: 09/15/21 1219     Color, UA Yellow     Clarity, UA Clear     pH, UA 5 5     Leukocytes, UA Negative     Nitrite, UA Negative     Protein, UA Negative mg/dl      Glucose, UA Negative mg/dl      Ketones, UA Negative mg/dl      Urobilinogen, UA 0 2 E U /dl      Bilirubin, UA Negative     Blood, UA Negative     Specific Gravity, UA 1 025    Narrative:      CLIN[facility] RESULT                 CT abdomen pelvis with contrast   Final Result by Leon Thomas MD (09/15 1609)      Small right corpus luteal cyst    Otherwise, no acute abnormality in the abdomen or pelvis  Additional chronic/incidental findings as detailed above              Workstation performed: [id number]         US pelvis complete Final Result by Colby Oakes MD (09/15 1343)       Complex right ovarian 1 8 cm cyst may suggest complex luteum cyst or hemorrhagic cyst    No follow-up  Small amount of free fluid in the pelvis  Workstation performed: [id number]                    Procedures  Procedures         ED Course                                           MDM  Number of Diagnoses or Management Options  Diagnosis management comments: Differential diagnosis includes but not limited to:  Ovarian cyst, ovarian cyst with rupture, intermittent ovarian torsion, colitis         Amount and/or Complexity of Data Reviewed  Clinical lab tests: ordered  Tests in the radiology section of CPT®: ordered        Disposition  Final diagnoses:   Lower abdominal pain     Time reflects when diagnosis was documented in both MDM as applicable and the Disposition within this note     Time User Action Codes Description Comment    9/15/2021  4:21 PM Stephen Shahid Add [R10 30] Lower abdominal pain       ED Disposition     ED Disposition Condition Date/Time Comment    Discharge Stable Wed Sep 15, 2021  4:21 PM [address] discharge to home/self care  Follow-up Information     Follow up With Specialties Details Why Contact Info    Rishabh Payne MD Family Medicine   [address] [place] [address]  [phone]            Patient's Medications   Discharge Prescriptions    No medications on file     No discharge procedures on file      PDMP Review       Value Time User    PDMP Reviewed  Yes 7/23/2020 10:12 AM Rishabh Payne MD          ED Provider  Electronically Signed by           Reymundo Pryor PA-C  09/15/21 [place] RONDA Contreras  09/15/21 5720

## 2021-09-28 ENCOUNTER — VBI (OUTPATIENT)
Dept: ADMINISTRATIVE | Facility: OTHER | Age: 31
End: 2021-09-28

## 2021-10-04 ENCOUNTER — OFFICE VISIT (OUTPATIENT)
Dept: GASTROENTEROLOGY | Facility: CLINIC | Age: 31
End: 2021-10-04
Payer: COMMERCIAL

## 2021-10-04 ENCOUNTER — TELEPHONE (OUTPATIENT)
Dept: GASTROENTEROLOGY | Facility: CLINIC | Age: 31
End: 2021-10-04

## 2021-10-04 VITALS
HEIGHT: 69 IN | DIASTOLIC BLOOD PRESSURE: 78 MMHG | BODY MASS INDEX: 26.07 KG/M2 | WEIGHT: 176 LBS | SYSTOLIC BLOOD PRESSURE: 128 MMHG | HEART RATE: 84 BPM

## 2021-10-04 DIAGNOSIS — K59.00 CONSTIPATION, UNSPECIFIED CONSTIPATION TYPE: ICD-10-CM

## 2021-10-04 DIAGNOSIS — R10.9 ABDOMINAL PAIN, UNSPECIFIED ABDOMINAL LOCATION: Primary | ICD-10-CM

## 2021-10-04 DIAGNOSIS — K90.0 CELIAC DISEASE: ICD-10-CM

## 2021-10-04 PROCEDURE — 1036F TOBACCO NON-USER: CPT | Performed by: PHYSICIAN ASSISTANT

## 2021-10-04 PROCEDURE — 99204 OFFICE O/P NEW MOD 45 MIN: CPT | Performed by: PHYSICIAN ASSISTANT

## 2021-10-04 PROCEDURE — 3008F BODY MASS INDEX DOCD: CPT | Performed by: PHYSICIAN ASSISTANT

## 2021-10-04 RX ORDER — DICYCLOMINE HCL 20 MG
20 TABLET ORAL 3 TIMES DAILY PRN
Qty: 90 TABLET | Refills: 1 | Status: SHIPPED | OUTPATIENT
Start: 2021-10-04 | End: 2021-11-02

## 2021-10-13 ENCOUNTER — TELEPHONE (OUTPATIENT)
Dept: GASTROENTEROLOGY | Facility: CLINIC | Age: 31
End: 2021-10-13

## 2021-11-02 DIAGNOSIS — K90.0 CELIAC DISEASE: ICD-10-CM

## 2021-11-02 DIAGNOSIS — R10.9 ABDOMINAL PAIN, UNSPECIFIED ABDOMINAL LOCATION: ICD-10-CM

## 2021-11-02 RX ORDER — DICYCLOMINE HCL 20 MG
20 TABLET ORAL 3 TIMES DAILY PRN
Qty: 90 TABLET | Refills: 1 | Status: SHIPPED | OUTPATIENT
Start: 2021-11-02 | End: 2022-06-07

## 2021-11-30 ENCOUNTER — HOSPITAL ENCOUNTER (EMERGENCY)
Facility: HOSPITAL | Age: 31
Discharge: HOME | End: 2021-11-30
Attending: EMERGENCY MEDICINE
Payer: COMMERCIAL

## 2021-11-30 ENCOUNTER — APPOINTMENT (EMERGENCY)
Dept: ULTRASOUND IMAGING | Facility: HOSPITAL | Age: 31
End: 2021-11-30
Payer: COMMERCIAL

## 2021-11-30 VITALS
HEIGHT: 69 IN | TEMPERATURE: 98.4 F | SYSTOLIC BLOOD PRESSURE: 119 MMHG | RESPIRATION RATE: 16 BRPM | OXYGEN SATURATION: 100 % | DIASTOLIC BLOOD PRESSURE: 84 MMHG | WEIGHT: 173 LBS | BODY MASS INDEX: 25.62 KG/M2 | HEART RATE: 84 BPM

## 2021-11-30 DIAGNOSIS — O03.9 MISCARRIAGE: Primary | ICD-10-CM

## 2021-11-30 DIAGNOSIS — O03.4 INCOMPLETE ABORTION: ICD-10-CM

## 2021-11-30 LAB
ABO GROUP BLD: NORMAL
ALBUMIN SERPL BCP-MCNC: 4.5 G/DL (ref 3.4–4.8)
ALP SERPL-CCNC: 40.6 U/L (ref 35–140)
ALT SERPL W P-5'-P-CCNC: 121 U/L (ref 5–54)
ANION GAP SERPL CALCULATED.3IONS-SCNC: 11 MMOL/L (ref 4–13)
AST SERPL W P-5'-P-CCNC: 59 U/L (ref 15–41)
B-HCG SERPL-ACNC: 5378 MIU/ML
BASOPHILS # BLD AUTO: 0.03 THOUSANDS/ΜL (ref 0–0.1)
BASOPHILS NFR BLD AUTO: 0 % (ref 0–1)
BILIRUB SERPL-MCNC: 0.24 MG/DL (ref 0.3–1.2)
BUN SERPL-MCNC: 14 MG/DL (ref 6–20)
CALCIUM SERPL-MCNC: 9.3 MG/DL (ref 8.4–10.2)
CHLORIDE SERPL-SCNC: 106 MMOL/L (ref 96–108)
CO2 SERPL-SCNC: 23 MMOL/L (ref 22–33)
CREAT SERPL-MCNC: 0.72 MG/DL (ref 0.4–1.1)
EOSINOPHIL # BLD AUTO: 0.11 THOUSAND/ΜL (ref 0–0.61)
EOSINOPHIL NFR BLD AUTO: 1 % (ref 0–6)
ERYTHROCYTE [DISTWIDTH] IN BLOOD BY AUTOMATED COUNT: 12.3 % (ref 11.6–15.1)
GFR SERPL CREATININE-BSD FRML MDRD: 114 ML/MIN/1.73SQ M
GLUCOSE SERPL-MCNC: 140 MG/DL (ref 65–140)
HCT VFR BLD AUTO: 36.3 % (ref 34.8–46.1)
HGB BLD-MCNC: 11.9 G/DL (ref 11.5–15.4)
IMM GRANULOCYTES # BLD AUTO: 0.01 THOUSAND/UL (ref 0–0.2)
IMM GRANULOCYTES NFR BLD AUTO: 0 % (ref 0–2)
LYMPHOCYTES # BLD AUTO: 1.73 THOUSANDS/ΜL (ref 0.6–4.47)
LYMPHOCYTES NFR BLD AUTO: 22 % (ref 14–44)
MCH RBC QN AUTO: 30.5 PG (ref 26.8–34.3)
MCHC RBC AUTO-ENTMCNC: 32.8 G/DL (ref 31.4–37.4)
MCV RBC AUTO: 93 FL (ref 82–98)
MONOCYTES # BLD AUTO: 0.5 THOUSAND/ΜL (ref 0.17–1.22)
MONOCYTES NFR BLD AUTO: 6 % (ref 4–12)
NEUTROPHILS # BLD AUTO: 5.66 THOUSANDS/ΜL (ref 1.85–7.62)
NEUTS SEG NFR BLD AUTO: 71 % (ref 43–75)
PLATELET # BLD AUTO: 247 THOUSANDS/UL (ref 149–390)
PMV BLD AUTO: 10.1 FL (ref 8.9–12.7)
POTASSIUM SERPL-SCNC: 3.5 MMOL/L (ref 3.5–5)
PROT SERPL-MCNC: 6.9 G/DL (ref 6.4–8.3)
RBC # BLD AUTO: 3.9 MILLION/UL (ref 3.81–5.12)
RH BLD: POSITIVE
SODIUM SERPL-SCNC: 140 MMOL/L (ref 133–145)
WBC # BLD AUTO: 8.04 THOUSAND/UL (ref 4.31–10.16)

## 2021-11-30 PROCEDURE — 96375 TX/PRO/DX INJ NEW DRUG ADDON: CPT

## 2021-11-30 PROCEDURE — 86900 BLOOD TYPING SEROLOGIC ABO: CPT | Performed by: EMERGENCY MEDICINE

## 2021-11-30 PROCEDURE — 84702 CHORIONIC GONADOTROPIN TEST: CPT | Performed by: EMERGENCY MEDICINE

## 2021-11-30 PROCEDURE — 36415 COLL VENOUS BLD VENIPUNCTURE: CPT | Performed by: EMERGENCY MEDICINE

## 2021-11-30 PROCEDURE — 80053 COMPREHEN METABOLIC PANEL: CPT | Performed by: EMERGENCY MEDICINE

## 2021-11-30 PROCEDURE — 96374 THER/PROPH/DIAG INJ IV PUSH: CPT

## 2021-11-30 PROCEDURE — 85025 COMPLETE CBC W/AUTO DIFF WBC: CPT | Performed by: EMERGENCY MEDICINE

## 2021-11-30 PROCEDURE — 86901 BLOOD TYPING SEROLOGIC RH(D): CPT | Performed by: EMERGENCY MEDICINE

## 2021-11-30 PROCEDURE — 99285 EMERGENCY DEPT VISIT HI MDM: CPT | Performed by: EMERGENCY MEDICINE

## 2021-11-30 PROCEDURE — 96376 TX/PRO/DX INJ SAME DRUG ADON: CPT

## 2021-11-30 PROCEDURE — 76801 OB US < 14 WKS SINGLE FETUS: CPT

## 2021-11-30 PROCEDURE — 99284 EMERGENCY DEPT VISIT MOD MDM: CPT

## 2021-11-30 RX ORDER — ACETAMINOPHEN AND CODEINE PHOSPHATE 300; 30 MG/1; MG/1
1-2 TABLET ORAL EVERY 6 HOURS PRN
Qty: 15 TABLET | Refills: 0 | Status: SHIPPED | OUTPATIENT
Start: 2021-11-30 | End: 2021-12-27 | Stop reason: ALTCHOICE

## 2021-11-30 RX ORDER — KETOROLAC TROMETHAMINE 30 MG/ML
30 INJECTION, SOLUTION INTRAMUSCULAR; INTRAVENOUS ONCE
Status: COMPLETED | OUTPATIENT
Start: 2021-11-30 | End: 2021-11-30

## 2021-11-30 RX ORDER — NAPROXEN 500 MG/1
500 TABLET ORAL 2 TIMES DAILY WITH MEALS
Qty: 20 TABLET | Refills: 0 | Status: SHIPPED | OUTPATIENT
Start: 2021-11-30 | End: 2021-12-27 | Stop reason: ALTCHOICE

## 2021-11-30 RX ORDER — OXYCODONE HYDROCHLORIDE AND ACETAMINOPHEN 5; 325 MG/1; MG/1
1 TABLET ORAL ONCE
Status: COMPLETED | OUTPATIENT
Start: 2021-11-30 | End: 2021-11-30

## 2021-11-30 RX ADMIN — KETOROLAC TROMETHAMINE 30 MG: 30 INJECTION, SOLUTION INTRAMUSCULAR at 21:11

## 2021-11-30 RX ADMIN — MORPHINE SULFATE 2 MG: 2 INJECTION, SOLUTION INTRAMUSCULAR; INTRAVENOUS at 21:57

## 2021-11-30 RX ADMIN — OXYCODONE HYDROCHLORIDE AND ACETAMINOPHEN 1 TABLET: 5; 325 TABLET ORAL at 23:28

## 2021-11-30 RX ADMIN — MORPHINE SULFATE 2 MG: 2 INJECTION, SOLUTION INTRAMUSCULAR; INTRAVENOUS at 23:28

## 2021-12-16 DIAGNOSIS — F42.2 MIXED OBSESSIONAL THOUGHTS AND ACTS: ICD-10-CM

## 2021-12-16 DIAGNOSIS — F41.9 ANXIETY: ICD-10-CM

## 2021-12-16 DIAGNOSIS — F33.0 MILD EPISODE OF RECURRENT MAJOR DEPRESSIVE DISORDER (HCC): ICD-10-CM

## 2021-12-27 ENCOUNTER — TELEMEDICINE (OUTPATIENT)
Dept: FAMILY MEDICINE CLINIC | Facility: CLINIC | Age: 31
End: 2021-12-27
Payer: COMMERCIAL

## 2021-12-27 DIAGNOSIS — F42.2 MIXED OBSESSIONAL THOUGHTS AND ACTS: Primary | ICD-10-CM

## 2021-12-27 DIAGNOSIS — F33.42 RECURRENT MAJOR DEPRESSIVE DISORDER, IN FULL REMISSION (HCC): ICD-10-CM

## 2021-12-27 DIAGNOSIS — F41.9 ANXIETY: ICD-10-CM

## 2021-12-27 PROCEDURE — 99214 OFFICE O/P EST MOD 30 MIN: CPT | Performed by: FAMILY MEDICINE

## 2021-12-27 PROCEDURE — 1036F TOBACCO NON-USER: CPT | Performed by: FAMILY MEDICINE

## 2022-03-13 ENCOUNTER — HOSPITAL ENCOUNTER (EMERGENCY)
Facility: HOSPITAL | Age: 32
Discharge: HOME | End: 2022-03-13
Attending: EMERGENCY MEDICINE
Payer: COMMERCIAL

## 2022-03-13 ENCOUNTER — APPOINTMENT (EMERGENCY)
Dept: ULTRASOUND IMAGING | Facility: HOSPITAL | Age: 32
End: 2022-03-13
Payer: COMMERCIAL

## 2022-03-13 VITALS
DIASTOLIC BLOOD PRESSURE: 76 MMHG | OXYGEN SATURATION: 100 % | SYSTOLIC BLOOD PRESSURE: 117 MMHG | TEMPERATURE: 97.5 F | HEART RATE: 85 BPM | RESPIRATION RATE: 16 BRPM

## 2022-03-13 DIAGNOSIS — N39.0 UTI (URINARY TRACT INFECTION): Primary | ICD-10-CM

## 2022-03-13 LAB
ALBUMIN SERPL BCP-MCNC: 4.5 G/DL (ref 3.5–5)
ALP SERPL-CCNC: 40 U/L (ref 34–104)
ALT SERPL W P-5'-P-CCNC: 26 U/L (ref 7–52)
ANION GAP SERPL CALCULATED.3IONS-SCNC: 8 MMOL/L (ref 4–13)
AST SERPL W P-5'-P-CCNC: 19 U/L (ref 13–39)
BACTERIA UR QL AUTO: ABNORMAL /HPF
BASOPHILS # BLD AUTO: 0.02 THOUSANDS/ΜL (ref 0–0.1)
BASOPHILS NFR BLD AUTO: 0 % (ref 0–1)
BILIRUB SERPL-MCNC: 0.28 MG/DL (ref 0.2–1)
BILIRUB UR QL STRIP: NEGATIVE
BUN SERPL-MCNC: 13 MG/DL (ref 5–25)
CALCIUM SERPL-MCNC: 9.2 MG/DL (ref 8.4–10.2)
CHLORIDE SERPL-SCNC: 107 MMOL/L (ref 96–108)
CLARITY UR: CLEAR
CO2 SERPL-SCNC: 24 MMOL/L (ref 21–32)
COLOR UR: ABNORMAL
CREAT SERPL-MCNC: 0.75 MG/DL (ref 0.6–1.3)
EOSINOPHIL # BLD AUTO: 0.11 THOUSAND/ΜL (ref 0–0.61)
EOSINOPHIL NFR BLD AUTO: 1 % (ref 0–6)
ERYTHROCYTE [DISTWIDTH] IN BLOOD BY AUTOMATED COUNT: 12.1 % (ref 11.6–15.1)
EXT PREG TEST URINE: NEGATIVE
EXT. CONTROL ED NAV: NORMAL
GFR SERPL CREATININE-BSD FRML MDRD: 108 ML/MIN/1.73SQ M
GLUCOSE SERPL-MCNC: 96 MG/DL (ref 65–140)
GLUCOSE UR STRIP-MCNC: ABNORMAL MG/DL
HCT VFR BLD AUTO: 37.7 % (ref 34.8–46.1)
HGB BLD-MCNC: 12.9 G/DL (ref 11.5–15.4)
HGB UR QL STRIP.AUTO: ABNORMAL
IMM GRANULOCYTES # BLD AUTO: 0.03 THOUSAND/UL (ref 0–0.2)
IMM GRANULOCYTES NFR BLD AUTO: 0 % (ref 0–2)
KETONES UR STRIP-MCNC: NEGATIVE MG/DL
LEUKOCYTE ESTERASE UR QL STRIP: ABNORMAL
LYMPHOCYTES # BLD AUTO: 1.93 THOUSANDS/ΜL (ref 0.6–4.47)
LYMPHOCYTES NFR BLD AUTO: 23 % (ref 14–44)
MCH RBC QN AUTO: 30.7 PG (ref 26.8–34.3)
MCHC RBC AUTO-ENTMCNC: 34.2 G/DL (ref 31.4–37.4)
MCV RBC AUTO: 90 FL (ref 82–98)
MONOCYTES # BLD AUTO: 0.61 THOUSAND/ΜL (ref 0.17–1.22)
MONOCYTES NFR BLD AUTO: 7 % (ref 4–12)
NEUTROPHILS # BLD AUTO: 5.89 THOUSANDS/ΜL (ref 1.85–7.62)
NEUTS SEG NFR BLD AUTO: 69 % (ref 43–75)
NITRITE UR QL STRIP: POSITIVE
NON-SQ EPI CELLS URNS QL MICRO: ABNORMAL /HPF
NRBC BLD AUTO-RTO: 0 /100 WBCS
PH UR STRIP.AUTO: 6 [PH]
PLATELET # BLD AUTO: 253 THOUSANDS/UL (ref 149–390)
PMV BLD AUTO: 10.3 FL (ref 8.9–12.7)
POTASSIUM SERPL-SCNC: 3.7 MMOL/L (ref 3.5–5.3)
PROT SERPL-MCNC: 6.8 G/DL (ref 6.4–8.4)
PROT UR STRIP-MCNC: NEGATIVE MG/DL
RBC # BLD AUTO: 4.2 MILLION/UL (ref 3.81–5.12)
RBC #/AREA URNS AUTO: ABNORMAL /HPF
SODIUM SERPL-SCNC: 139 MMOL/L (ref 135–147)
SP GR UR STRIP.AUTO: <=1.005 (ref 1–1.03)
UROBILINOGEN UR QL STRIP.AUTO: 1 E.U./DL
WBC # BLD AUTO: 8.59 THOUSAND/UL (ref 4.31–10.16)
WBC #/AREA URNS AUTO: ABNORMAL /HPF

## 2022-03-13 PROCEDURE — 76856 US EXAM PELVIC COMPLETE: CPT

## 2022-03-13 PROCEDURE — 99284 EMERGENCY DEPT VISIT MOD MDM: CPT

## 2022-03-13 PROCEDURE — 81025 URINE PREGNANCY TEST: CPT | Performed by: STUDENT IN AN ORGANIZED HEALTH CARE EDUCATION/TRAINING PROGRAM

## 2022-03-13 PROCEDURE — 81001 URINALYSIS AUTO W/SCOPE: CPT | Performed by: STUDENT IN AN ORGANIZED HEALTH CARE EDUCATION/TRAINING PROGRAM

## 2022-03-13 PROCEDURE — 96365 THER/PROPH/DIAG IV INF INIT: CPT

## 2022-03-13 PROCEDURE — 80053 COMPREHEN METABOLIC PANEL: CPT | Performed by: STUDENT IN AN ORGANIZED HEALTH CARE EDUCATION/TRAINING PROGRAM

## 2022-03-13 PROCEDURE — 76830 TRANSVAGINAL US NON-OB: CPT

## 2022-03-13 PROCEDURE — 96366 THER/PROPH/DIAG IV INF ADDON: CPT

## 2022-03-13 PROCEDURE — 96375 TX/PRO/DX INJ NEW DRUG ADDON: CPT

## 2022-03-13 PROCEDURE — 36415 COLL VENOUS BLD VENIPUNCTURE: CPT | Performed by: STUDENT IN AN ORGANIZED HEALTH CARE EDUCATION/TRAINING PROGRAM

## 2022-03-13 PROCEDURE — 99284 EMERGENCY DEPT VISIT MOD MDM: CPT | Performed by: STUDENT IN AN ORGANIZED HEALTH CARE EDUCATION/TRAINING PROGRAM

## 2022-03-13 PROCEDURE — 85025 COMPLETE CBC W/AUTO DIFF WBC: CPT | Performed by: STUDENT IN AN ORGANIZED HEALTH CARE EDUCATION/TRAINING PROGRAM

## 2022-03-13 PROCEDURE — 87086 URINE CULTURE/COLONY COUNT: CPT | Performed by: STUDENT IN AN ORGANIZED HEALTH CARE EDUCATION/TRAINING PROGRAM

## 2022-03-13 PROCEDURE — 87077 CULTURE AEROBIC IDENTIFY: CPT | Performed by: STUDENT IN AN ORGANIZED HEALTH CARE EDUCATION/TRAINING PROGRAM

## 2022-03-13 PROCEDURE — 87186 SC STD MICRODIL/AGAR DIL: CPT | Performed by: STUDENT IN AN ORGANIZED HEALTH CARE EDUCATION/TRAINING PROGRAM

## 2022-03-13 RX ORDER — NITROFURANTOIN 25; 75 MG/1; MG/1
100 CAPSULE ORAL 2 TIMES DAILY
Qty: 10 CAPSULE | Refills: 0 | Status: SHIPPED | OUTPATIENT
Start: 2022-03-13 | End: 2022-03-18

## 2022-03-13 RX ORDER — KETOROLAC TROMETHAMINE 30 MG/ML
15 INJECTION, SOLUTION INTRAMUSCULAR; INTRAVENOUS ONCE
Status: COMPLETED | OUTPATIENT
Start: 2022-03-13 | End: 2022-03-13

## 2022-03-13 RX ORDER — NITROFURANTOIN 25; 75 MG/1; MG/1
100 CAPSULE ORAL 2 TIMES DAILY WITH MEALS
Status: DISCONTINUED | OUTPATIENT
Start: 2022-03-13 | End: 2022-03-13

## 2022-03-13 RX ORDER — ACETAMINOPHEN 325 MG/1
650 TABLET ORAL ONCE
Status: COMPLETED | OUTPATIENT
Start: 2022-03-13 | End: 2022-03-13

## 2022-03-13 RX ORDER — CEFTRIAXONE 1 G/50ML
1000 INJECTION, SOLUTION INTRAVENOUS ONCE
Status: COMPLETED | OUTPATIENT
Start: 2022-03-13 | End: 2022-03-13

## 2022-03-13 RX ADMIN — CEFTRIAXONE 1000 MG: 1 INJECTION, SOLUTION INTRAVENOUS at 01:20

## 2022-03-13 RX ADMIN — ACETAMINOPHEN 650 MG: 325 TABLET, FILM COATED ORAL at 01:18

## 2022-03-13 RX ADMIN — KETOROLAC TROMETHAMINE 15 MG: 30 INJECTION, SOLUTION INTRAMUSCULAR at 01:16

## 2022-03-13 RX ADMIN — SODIUM CHLORIDE 1000 ML: 0.9 INJECTION, SOLUTION INTRAVENOUS at 01:16

## 2022-03-13 NOTE — DISCHARGE INSTRUCTIONS
Please take all 5 days of your antibiotic  Can take tylenol/ibuprofen as needed for pain  Please follow up with your PCP to ensure resolution of symptoms  Please return to the ED with any new or worsening symptoms

## 2022-03-13 NOTE — ED PROVIDER NOTES
History  Chief Complaint   Patient presents with    Pelvic Pain     Pt presents to the ED with pelvic pain and urinary frequency     PMHx: anxiety, depression    Joe Crawford is a 36year old female who presents to the ED with urinary frequency, dysuria, suprapubic pain, and low back pain that began about 3-4 hours PTA, denies alleviating/aggravating factors, states has been taking Azo, denies taking any other medication for symptom control PTA  Per chart review, patient prescribed macrobid for possible cystitis 2/16, states took 2-3 days worth but then was told to stop as urine culture negative  Patient also notes recent miscarriage requiring D&C for retained products of conception 12/2021  Patient also notes h/o bilateral ovarian cysts  Patient denies fever/chills, vaginal bleeding, vaginal pain, vaginal discharge, hematuria, flank pain, n/v/d, SOB, CP, or any other concerns at this time  Patient denies numbness/tingling including perianal anesthesia, bladder/bowel incontinence, urinary retention, weakness, h/o MRSA, h/o CA, denies trauma to her back  History provided by:  Patient and medical records   used: No        Prior to Admission Medications   Prescriptions Last Dose Informant Patient Reported? Taking?    ALPRAZolam (XANAX) 0 25 mg tablet  Self No No   Sig: Take 1 tablet (0 25 mg total) by mouth 3 (three) times a day as needed for anxiety One tab po q 8 hr prn anxiety   dicyclomine (BENTYL) 20 mg tablet   No No   Sig: TAKE 1 TABLET (20 MG TOTAL) BY MOUTH 3 (THREE) TIMES A DAY AS NEEDED (ABDOMINAL PAIN)   sertraline (ZOLOFT) 50 mg tablet   No No   Sig: Take 1 tablet (50 mg total) by mouth daily      Facility-Administered Medications: None       Past Medical History:   Diagnosis Date    Abnormal weight gain     last assessed: 11/30/2016    Anxiety     last assessed: 12/09/2016    Depression        Past Surgical History:   Procedure Laterality Date    BACK SURGERY      discectomy L5-S1; last assessed: 07/07/2015    LUMBAR DISCECTOMY  2012    [facility] @ L5/S1    RI COLONOSCOPY FLX DX W/[facility] WHEN PFRMD N/A 7/28/2016    Procedure: COLONOSCOPY;  Surgeon: Tequila Coronado MD;  Location: [facility]; Service: Gastroenterology       Family History   Problem Relation Age of Onset    Depression Mother     Hypertension Father     Diabetes Father     Depression Brother     Stroke Maternal Grandmother     Heart attack Paternal Grandmother      I have reviewed and agree with the history as documented  E-Cigarette/Vaping    E-Cigarette Use Never User      E-Cigarette/Vaping Substances    Nicotine No     THC No     CBD No     Flavoring No     Other No     Unknown No      Social History     Tobacco Use    Smoking status: Never Smoker    Smokeless tobacco: Never Used   Vaping Use    Vaping Use: Never used   Substance Use Topics    Alcohol use: Yes     Comment: socially (does not drink alcohol-as per [employer])    Drug use: No       Review of Systems   Constitutional: Negative for chills and fever  HENT: Negative for ear pain, sore throat and trouble swallowing  Eyes: Negative for pain and visual disturbance  Respiratory: Negative for cough and shortness of breath  Cardiovascular: Negative for chest pain and palpitations  Gastrointestinal: Negative for diarrhea, nausea and vomiting  Genitourinary: Positive for dysuria, frequency and pelvic pain  Negative for flank pain, hematuria, vaginal bleeding, vaginal discharge and vaginal pain  Musculoskeletal: Positive for back pain  Negative for arthralgias  Skin: Negative for color change and rash  Neurological: Negative for syncope, weakness, light-headedness and numbness  Physical Exam  Physical Exam  Vitals and nursing note reviewed  Constitutional:       General: She is in acute distress (2/2 pain)  Appearance: Normal appearance  She is well-developed  She is not ill-appearing     HENT:      Head: Normocephalic and atraumatic  Right Ear: External ear normal       Left Ear: External ear normal       Nose: Nose normal       Mouth/Throat:      Mouth: Mucous membranes are moist    Eyes:      General:         Right eye: No discharge  Left eye: No discharge  Conjunctiva/sclera: Conjunctivae normal    Cardiovascular:      Rate and Rhythm: Normal rate and regular rhythm  Heart sounds: No murmur heard  No friction rub  No gallop  Pulmonary:      Effort: Pulmonary effort is normal  No respiratory distress  Breath sounds: Normal breath sounds  No stridor  No wheezing, rhonchi or rales  Abdominal:      General: Abdomen is flat  Bowel sounds are normal  There is no distension  Palpations: Abdomen is soft  Tenderness: There is abdominal tenderness (suprapubic)  There is no right CVA tenderness, left CVA tenderness, guarding or rebound  Genitourinary:     Comments: Deferred  Musculoskeletal:         General: No deformity or signs of injury  Normal range of motion  Cervical back: Normal range of motion and neck supple  Skin:     General: Skin is warm and dry  Capillary Refill: Capillary refill takes less than 2 seconds  Findings: No bruising, erythema or rash  Neurological:      General: No focal deficit present  Mental Status: She is alert and oriented to person, place, and time  Sensory: No sensory deficit (no saddle anesthesia)  Motor: No weakness        Coordination: Coordination normal       Gait: Gait normal    Psychiatric:         Mood and Affect: Mood normal          Vital Signs  ED Triage Vitals [03/13/22 0024]   Temperature Pulse Respirations Blood Pressure SpO2   97 5 °F (36 4 °C) 82 16 140/81 100 %      Temp Source Heart Rate Source Patient Position - Orthostatic VS BP Location FiO2 (%)   Oral Monitor Sitting Right arm --      Pain Score       7           Vitals:    03/13/22 0024 03/13/22 0352   BP: 140/81 117/76   Pulse: 82 85 Patient Position - Orthostatic VS: Sitting Sitting         Visual Acuity      ED Medications  Medications   acetaminophen (TYLENOL) tablet 650 mg (650 mg Oral Given 3/13/22 0118)   sodium chloride 0 9 % bolus 1,000 mL (0 mL Intravenous Stopped 3/13/22 0352)   ketorolac (TORADOL) injection 15 mg (15 mg Intravenous Given 3/13/22 0116)   cefTRIAXone (ROCEPHIN) IVPB (premix in dextrose) 1,000 mg 50 mL (0 mg Intravenous Stopped 3/13/22 0351)       Diagnostic Studies  Results Reviewed     Procedure Component Value Units Date/Time    Comprehensive metabolic panel [[id number]] Collected: 03/13/22 0111    Lab Status: Final result Specimen: Blood from Arm, Left Updated: 03/13/22 0136     Sodium 139 mmol/L      Potassium 3 7 mmol/L      Chloride 107 mmol/L      CO2 24 mmol/L      ANION GAP 8 mmol/L      BUN 13 mg/dL      Creatinine 0 75 mg/dL      Glucose 96 mg/dL      Calcium 9 2 mg/dL      AST 19 U/L      ALT 26 U/L      Alkaline Phosphatase 40 U/L      Total Protein 6 8 g/dL      Albumin 4 5 g/dL      Total Bilirubin 0 28 mg/dL      eGFR 108 ml/min/1 73sq m     Narrative:      [place] guidelines for Chronic Kidney Disease (CKD):     Stage 1 with normal or high GFR (GFR > 90 mL/min/1 73 square meters)    Stage 2 Mild CKD (GFR = 60-89 mL/min/1 73 square meters)    Stage 3A Moderate CKD (GFR = 45-59 mL/min/1 73 square meters)    Stage 3B Moderate CKD (GFR = 30-44 mL/min/1 73 square meters)    Stage 4 Severe CKD (GFR = 15-29 mL/min/1 73 square meters)    Stage 5 End Stage CKD (GFR <15 mL/min/1 73 square meters)  Note: GFR calculation is accurate only with a steady state creatinine    CBC and differential [[id number]] Collected: 03/13/22 0111    Lab Status: Final result Specimen: Blood from Arm, Left Updated: 03/13/22 0122     WBC 8 59 Thousand/uL      RBC 4 20 Million/uL      Hemoglobin 12 9 g/dL      Hematocrit 37 7 %      MCV 90 fL      MCH 30 7 pg      MCHC 34 2 g/dL      RDW 12 1 %      MPV 10 3 fL      Platelets 032 Thousands/uL      nRBC 0 /100 WBCs      Neutrophils Relative 69 %      Immat GRANS % 0 %      Lymphocytes Relative 23 %      Monocytes Relative 7 %      Eosinophils Relative 1 %      Basophils Relative 0 %      Neutrophils Absolute 5 89 Thousands/µL      Immature Grans Absolute 0 03 Thousand/uL      Lymphocytes Absolute 1 93 Thousands/µL      Monocytes Absolute 0 61 Thousand/µL      Eosinophils Absolute 0 11 Thousand/µL      Basophils Absolute 0 02 Thousands/µL     Urine Microscopic [[id number]]  (Abnormal) Collected: 03/13/22 0041    Lab Status: Final result Specimen: Urine, Clean Catch Updated: 03/13/22 0056     RBC, UA 1-2 /hpf      WBC, UA 4-10 /hpf      Epithelial Cells Occasional /hpf      Bacteria, UA Occasional /hpf      URINE COMMENT --    UA w Reflex to Microscopic w Reflex to Culture [[id number]]  (Abnormal) Collected: 03/13/22 0041    Lab Status: Final result Specimen: Urine, Clean Catch Updated: 03/13/22 0047     Color, UA Orange     Clarity, UA Clear     Specific Gravity, UA <=1 005     pH, UA 6 0     Leukocytes, UA Trace     Nitrite, UA Positive     Protein, UA Negative mg/dl      Glucose, UA Trace mg/dl      Ketones, UA Negative mg/dl      Urobilinogen, UA 1 0 E U /dl      Bilirubin, UA Negative     Blood, UA 1+     URINE COMMENT --    Urine culture [[id number]] Collected: 03/13/22 0041    Lab Status: In process Specimen: Urine, Clean Catch Updated: 03/13/22 0047    POCT pregnancy, urine [[id number]]  (Normal) Resulted: 03/13/22 0040    Lab Status: Final result Updated: 03/13/22 0041     EXT PREG TEST UR (Ref: Negative) Negative     Control Valid                 US pelvis complete w transvaginal   Final Result by Hilarie Duverney, DO (03/13 0320)       Small amount of free fluid in the pelvis                        Workstation performed: [id number]                    Procedures  Procedures         ED Course  ED Course as of 03/13/22 0424   Sun Mar 13, 2022   0050 Nitrite positive UTI, pregnancy negative, given back pain will give 1g ceftriaxone in ED  Patient will PCN allergy with hives, cross reactivity unlikely  0104 Patient with UTI and evidence of cystitis  To obtain transvaginal US to r/o ruptured ovarian cyst and ovarian torsion  No CVA TTP to suggest pyelo  Urolithiasis unlikely  No vaginal discharge to suggest PID     0333 Pain resolved, patient well appearing, VSS, no leukocytosis, symptoms likely 2/2 cystitis, will treat with 5 day course of macrobid  SBIRT 22yo+      Most Recent Value   SBIRT (22 yo +)    In order to provide better care to our patients, we are screening all of our patients for alcohol and drug use  Would it be okay to ask you these screening questions? No Filed at: 03/13/2022 0031                    MDM  Number of Diagnoses or Management Options  UTI (urinary tract infection)  Diagnosis management comments: Patient is a 36year old female who presents to the ED with urinary frequency, dysuria, suprapubic pain, and low back pain x 3-4 hours, denies any other associated symptoms, no LBP red flag sxs, no neuro focal deficits on exam     Considered ovarian cyst and ovarian torsion, no evidence of either seen on US, no evidence of retained products of conception on US  No CVA TTP to suggest pyelonephritis  No vaginal discharge to suggest PID  Urolithiasis unlikely  Appendicitis unlikely  UA with nitrite positive UTI, symptoms likely 2/2 cystitis, 1g ceftriaxone given in ED  Patient well appearing, VSS, pain resolved, no leukocytosis, upreg negative, stable for discharge      -macrobid x 5 days  -ibuprofen/tylenol PRN  -can continue Azo  -f/u with PCP  -strict ED return precautions discussed     Results discussed with patient, who verbalized understanding and agreement with the management plan  Strict ED return instructions were discussed at bedside and all questions were answered   Prior to discharge, I provided both verbal and written instructions of the management plan and the signs and symptoms that should prompt the patient to return to the ED  All questions were answered and the patient was comfortable with the plan of care and discharged home  The patient agrees to return to the Emergency Department for concerns and/or progression of illness  Amount and/or Complexity of Data Reviewed  Clinical lab tests: ordered and reviewed  Tests in the radiology section of CPT®: ordered and reviewed    Patient Progress  Patient progress: improved      Disposition  Final diagnoses:   UTI (urinary tract infection)     Time reflects when diagnosis was documented in both MDM as applicable and the Disposition within this note     Time User Action Codes Description Comment    3/13/2022  3:28 AM Juancarlos Croon Add [N39 0] UTI (urinary tract infection)     3/13/2022  3:28 AM Juancarlos Croon Add [N30 90] Cystitis     3/13/2022  3:28 AM Juancarlos Croon Remove [N30 90] Cystitis       ED Disposition     ED Disposition Condition Date/Time Comment    Discharge Stable Sun Mar 13, 2022  3:28 AM Riccardo CAMPOS Robe discharge to home/self care  Follow-up Information     Follow up With Specialties Details Why Contact Info Additional Information    Yamini Velarde MD Family Medicine Schedule an appointment as soon as possible for a visit in 3 days  [address] [facility]  [address] Emergency Department Emergency Medicine  If symptoms worsen [address] [facility],[address] [id number]  [address] [place] [address] Emergency Department, [address]          Discharge Medication List as of 3/13/2022  3:33 AM      START taking these medications    Details   nitrofurantoin (MACROBID) 100 mg capsule Take 1 capsule (100 mg total) by mouth 2 (two) times a day for 5 days, Starting Sun 3/13/2022, Until Fri 3/18/2022, Normal         CONTINUE these medications which have NOT CHANGED    Details   ALPRAZolam (XANAX) 0 25 mg tablet Take 1 tablet (0 25 mg total) by mouth 3 (three) times a day as needed for anxiety One tab po q 8 hr prn anxiety, Starting Thu 7/23/2020, Normal      dicyclomine (BENTYL) 20 mg tablet TAKE 1 TABLET (20 MG TOTAL) BY MOUTH 3 (THREE) TIMES A DAY AS NEEDED (ABDOMINAL PAIN), Starting Tue 11/2/2021, Until Thu 12/2/2021 at 2359, Normal      sertraline (ZOLOFT) 50 mg tablet Take 1 tablet (50 mg total) by mouth daily, Starting Thu 12/16/2021, Normal             No discharge procedures on file      PDMP Review       Value Time User    PDMP Reviewed  Yes 7/23/2020 10:12 AM Dena Terry MD          ED Provider  Electronically Signed by           Evelio Martinez PA-C  03/13/22 0427

## 2022-03-15 LAB — BACTERIA UR CULT: ABNORMAL

## 2022-03-21 ENCOUNTER — APPOINTMENT (OUTPATIENT)
Dept: LAB | Facility: MEDICAL CENTER | Age: 32
End: 2022-03-21
Payer: COMMERCIAL

## 2022-03-21 ENCOUNTER — TELEPHONE (OUTPATIENT)
Dept: OBGYN CLINIC | Facility: CLINIC | Age: 32
End: 2022-03-21

## 2022-03-21 DIAGNOSIS — Z32.00 POSSIBLE PREGNANCY: ICD-10-CM

## 2022-03-21 LAB — B-HCG SERPL-ACNC: 185 MIU/ML

## 2022-03-21 PROCEDURE — 36415 COLL VENOUS BLD VENIPUNCTURE: CPT

## 2022-03-21 PROCEDURE — 84702 CHORIONIC GONADOTROPIN TEST: CPT

## 2022-03-21 NOTE — TELEPHONE ENCOUNTER
FYI: Mother called-Pt went to ER over weekend for UTI and they did a urine preg test which was negative,TV Ultrasound was normal but mother concerned about the 4mm endometrium  Daughter had a miscarriage in 12/21 and the 4mm endometrium only gives her a 1% change of carrying a pregnancy (per Online)  Advised mother patient needs a QBHCG first  Didn't miss a period yet    LMP  2/24/2021

## 2022-03-22 ENCOUNTER — TELEPHONE (OUTPATIENT)
Dept: OBGYN CLINIC | Facility: CLINIC | Age: 32
End: 2022-03-22

## 2022-03-22 NOTE — TELEPHONE ENCOUNTER
Mom, Allyson Called-LMP  2/24/2022 with QBHCG of 185  Pt was seen in ER for a UTI and they did a TV U/S and mom concerned with the 4mm endometrium since pt had a miscarriage in Dec 2021  Mom read a 4mm endometrium has only a 1% chance of carrying a baby to term  Daughter does not know her concern because she doesn't want to upset her  Mom wants to know if she can speak with you about this or can Juliette come in earlier for appt  She will be 4 weeks on Thursday  No bleeding

## 2022-03-28 ENCOUNTER — APPOINTMENT (OUTPATIENT)
Dept: LAB | Facility: MEDICAL CENTER | Age: 32
End: 2022-03-28
Payer: COMMERCIAL

## 2022-03-28 DIAGNOSIS — Z32.00 ENCOUNTER FOR PREGNANCY TEST, RESULT UNKNOWN: ICD-10-CM

## 2022-03-28 LAB — B-HCG SERPL-ACNC: 3451 MIU/ML

## 2022-03-28 PROCEDURE — 36415 COLL VENOUS BLD VENIPUNCTURE: CPT

## 2022-03-28 PROCEDURE — 84702 CHORIONIC GONADOTROPIN TEST: CPT

## 2022-04-23 LAB
EXTERNAL HEPATITIS B SURFACE ANTIGEN: 0
EXTERNAL HEPATITIS B SURFACE ANTIGEN: NEGATIVE
EXTERNAL HIV-1/2 AB-AG: NORMAL
EXTERNAL RUBELLA IGG QUANTITATION: 0.79
EXTERNAL SYPHILIS RPR SCREEN: NORMAL

## 2022-05-08 ENCOUNTER — HOSPITAL ENCOUNTER (EMERGENCY)
Facility: HOSPITAL | Age: 32
Discharge: HOME | End: 2022-05-08
Attending: EMERGENCY MEDICINE | Admitting: EMERGENCY MEDICINE
Payer: COMMERCIAL

## 2022-05-08 ENCOUNTER — APPOINTMENT (EMERGENCY)
Dept: ULTRASOUND IMAGING | Facility: HOSPITAL | Age: 32
End: 2022-05-08
Payer: COMMERCIAL

## 2022-05-08 VITALS
SYSTOLIC BLOOD PRESSURE: 137 MMHG | RESPIRATION RATE: 18 BRPM | HEART RATE: 112 BPM | DIASTOLIC BLOOD PRESSURE: 86 MMHG | OXYGEN SATURATION: 98 % | TEMPERATURE: 98.1 F

## 2022-05-08 DIAGNOSIS — R10.9 ABDOMINAL CRAMPING: ICD-10-CM

## 2022-05-08 DIAGNOSIS — O02.1 FETAL DEMISE BEFORE 20 WEEKS WITH RETENTION OF DEAD FETUS: ICD-10-CM

## 2022-05-08 DIAGNOSIS — Z34.90 PREGNANCY: Primary | ICD-10-CM

## 2022-05-08 LAB
ALBUMIN SERPL BCP-MCNC: 4.1 G/DL (ref 3.5–5)
ALP SERPL-CCNC: 38 U/L (ref 34–104)
ALT SERPL W P-5'-P-CCNC: 17 U/L (ref 7–52)
ANION GAP SERPL CALCULATED.3IONS-SCNC: 10 MMOL/L (ref 4–13)
AST SERPL W P-5'-P-CCNC: 15 U/L (ref 13–39)
B-HCG SERPL-ACNC: ABNORMAL MIU/ML (ref 0–11.6)
BASOPHILS # BLD AUTO: 0.01 THOUSANDS/ΜL (ref 0–0.1)
BASOPHILS NFR BLD AUTO: 0 % (ref 0–1)
BILIRUB SERPL-MCNC: 0.26 MG/DL (ref 0.2–1)
BILIRUB UR QL STRIP: NEGATIVE
BUN SERPL-MCNC: 9 MG/DL (ref 5–25)
CALCIUM SERPL-MCNC: 9.2 MG/DL (ref 8.4–10.2)
CHLORIDE SERPL-SCNC: 106 MMOL/L (ref 96–108)
CLARITY UR: CLEAR
CO2 SERPL-SCNC: 20 MMOL/L (ref 21–32)
COLOR UR: ABNORMAL
CREAT SERPL-MCNC: 0.6 MG/DL (ref 0.6–1.3)
EOSINOPHIL # BLD AUTO: 0.09 THOUSAND/ΜL (ref 0–0.61)
EOSINOPHIL NFR BLD AUTO: 1 % (ref 0–6)
ERYTHROCYTE [DISTWIDTH] IN BLOOD BY AUTOMATED COUNT: 13.2 % (ref 11.6–15.1)
GFR SERPL CREATININE-BSD FRML MDRD: 123 ML/MIN/1.73SQ M
GLUCOSE SERPL-MCNC: 79 MG/DL (ref 65–140)
GLUCOSE UR STRIP-MCNC: NEGATIVE MG/DL
HCT VFR BLD AUTO: 37.1 % (ref 34.8–46.1)
HGB BLD-MCNC: 12.7 G/DL (ref 11.5–15.4)
HGB UR QL STRIP.AUTO: NEGATIVE
IMM GRANULOCYTES # BLD AUTO: 0.02 THOUSAND/UL (ref 0–0.2)
IMM GRANULOCYTES NFR BLD AUTO: 0 % (ref 0–2)
KETONES UR STRIP-MCNC: NEGATIVE MG/DL
LEUKOCYTE ESTERASE UR QL STRIP: NEGATIVE
LYMPHOCYTES # BLD AUTO: 1.61 THOUSANDS/ΜL (ref 0.6–4.47)
LYMPHOCYTES NFR BLD AUTO: 23 % (ref 14–44)
MCH RBC QN AUTO: 31.2 PG (ref 26.8–34.3)
MCHC RBC AUTO-ENTMCNC: 34.2 G/DL (ref 31.4–37.4)
MCV RBC AUTO: 91 FL (ref 82–98)
MONOCYTES # BLD AUTO: 0.5 THOUSAND/ΜL (ref 0.17–1.22)
MONOCYTES NFR BLD AUTO: 7 % (ref 4–12)
NEUTROPHILS # BLD AUTO: 4.87 THOUSANDS/ΜL (ref 1.85–7.62)
NEUTS SEG NFR BLD AUTO: 69 % (ref 43–75)
NITRITE UR QL STRIP: NEGATIVE
NRBC BLD AUTO-RTO: 0 /100 WBCS
PH UR STRIP.AUTO: 7 [PH]
PLATELET # BLD AUTO: 268 THOUSANDS/UL (ref 149–390)
PMV BLD AUTO: 9.7 FL (ref 8.9–12.7)
POTASSIUM SERPL-SCNC: 3.9 MMOL/L (ref 3.5–5.3)
PROT SERPL-MCNC: 6.9 G/DL (ref 6.4–8.4)
PROT UR STRIP-MCNC: NEGATIVE MG/DL
RBC # BLD AUTO: 4.07 MILLION/UL (ref 3.81–5.12)
SODIUM SERPL-SCNC: 136 MMOL/L (ref 135–147)
SP GR UR STRIP.AUTO: <=1.005 (ref 1–1.03)
UROBILINOGEN UR QL STRIP.AUTO: 0.2 E.U./DL
WBC # BLD AUTO: 7.1 THOUSAND/UL (ref 4.31–10.16)

## 2022-05-08 PROCEDURE — 76817 TRANSVAGINAL US OBSTETRIC: CPT

## 2022-05-08 PROCEDURE — 80053 COMPREHEN METABOLIC PANEL: CPT | Performed by: EMERGENCY MEDICINE

## 2022-05-08 PROCEDURE — 99284 EMERGENCY DEPT VISIT MOD MDM: CPT | Performed by: EMERGENCY MEDICINE

## 2022-05-08 PROCEDURE — 99284 EMERGENCY DEPT VISIT MOD MDM: CPT

## 2022-05-08 PROCEDURE — 76816 OB US FOLLOW-UP PER FETUS: CPT

## 2022-05-08 PROCEDURE — 81003 URINALYSIS AUTO W/O SCOPE: CPT | Performed by: EMERGENCY MEDICINE

## 2022-05-08 PROCEDURE — 87086 URINE CULTURE/COLONY COUNT: CPT | Performed by: EMERGENCY MEDICINE

## 2022-05-08 PROCEDURE — 87147 CULTURE TYPE IMMUNOLOGIC: CPT | Performed by: EMERGENCY MEDICINE

## 2022-05-08 PROCEDURE — 84702 CHORIONIC GONADOTROPIN TEST: CPT | Performed by: EMERGENCY MEDICINE

## 2022-05-08 PROCEDURE — 85025 COMPLETE CBC W/AUTO DIFF WBC: CPT | Performed by: EMERGENCY MEDICINE

## 2022-05-08 PROCEDURE — 36415 COLL VENOUS BLD VENIPUNCTURE: CPT | Performed by: EMERGENCY MEDICINE

## 2022-05-08 NOTE — ED PROVIDER NOTES
History  Chief Complaint   Patient presents with    Pregnancy Problem     Reports 10 5 wks pregnant, reports cramping x few days, denies vaginal bleeding or spotting, reports boobs hurting and nausea has gone away, reports not being tired at all and bloating be gone  Reports prior miscarriage     35 y/o female hx of anxiety, depression,  US on 4/20/22 twin pregnancy at diamniotic twin live intrauterine pregnancy with Baby A at 8 weeks 0  days and Baby B at 7 weeks and 6 days  Patient has had one previous pregnancy (last year)  That pregnancy resulted in miscarriage  Patient presents with lower abdominal cramping  Patient also reports decrease of pregnancy symptoms such as decreased nausea and vomiting, decreased abdominal distension  Symptoms present for a couple days  No vaginal discharge or vaginal bleeding  Patient also reports dysuria    Patient is O+ per review of records  Abdominal Pain  Pain location:  Suprapubic  Pain quality: cramping    Pain radiates to:  Does not radiate  Pain severity:  Mild  Onset quality:  Gradual  Timing:  Constant  Progression:  Worsening  Chronicity:  Recurrent  Relieved by:  Nothing  Worsened by:  Nothing  Ineffective treatments:  None tried      Prior to Admission Medications   Prescriptions Last Dose Informant Patient Reported? Taking?    ALPRAZolam (XANAX) 0 25 mg tablet Not Taking at Unknown time Self No No   Sig: Take 1 tablet (0 25 mg total) by mouth 3 (three) times a day as needed for anxiety One tab po q 8 hr prn anxiety   Patient not taking: Reported on 5/8/2022    Prenatal Vit-Fe Fumarate-FA (PRENATAL 19 PO)   Yes Yes   Sig: Take by mouth   dicyclomine (BENTYL) 20 mg tablet   No No   Sig: TAKE 1 TABLET (20 MG TOTAL) BY MOUTH 3 (THREE) TIMES A DAY AS NEEDED (ABDOMINAL PAIN)   sertraline (ZOLOFT) 50 mg tablet   No Yes   Sig: TAKE 1 TABLET DAILY      Facility-Administered Medications: None       Past Medical History:   Diagnosis Date    Abnormal weight gain     last assessed: 11/30/2016    Anxiety     last assessed: 12/09/2016    Depression     Miscarriage        Past Surgical History:   Procedure Laterality Date    BACK SURGERY      discectomy L5-S1; last assessed: 07/07/2015    LUMBAR DISCECTOMY  2012    [facility] @ L5/S1    SC COLONOSCOPY FLX DX W/[facility] WHEN PFRMD N/A 7/28/2016    Procedure: COLONOSCOPY;  Surgeon: Brandy Ledbetter MD;  [facility]; Service: Gastroenterology       Family History   Problem Relation Age of Onset    Depression Mother     Hypertension Father     Diabetes Father     Depression Brother     Stroke Maternal Grandmother     Heart attack Paternal Grandmother      I have reviewed and agree with the history as documented  E-Cigarette/Vaping    E-Cigarette Use Never User      E-Cigarette/Vaping Substances    Nicotine No     THC No     CBD No     Flavoring No     Other No     Unknown No      Social History     Tobacco Use    Smoking status: Never Smoker    Smokeless tobacco: Never Used   Vaping Use    Vaping Use: Never used   Substance Use Topics    Alcohol use: Not Currently     Comment: socially (does not drink alcohol-as per [employer])    Drug use: No       Review of Systems   Gastrointestinal: Positive for abdominal pain  All other systems reviewed and are negative  Physical Exam  Physical Exam  Vitals and nursing note reviewed  Constitutional:       General: She is not in acute distress  Appearance: Normal appearance  She is not ill-appearing  HENT:      Head: Normocephalic and atraumatic  Right Ear: External ear normal       Left Ear: External ear normal       Nose: Nose normal       Mouth/Throat:      Mouth: Mucous membranes are moist    Eyes:      General:         Right eye: No discharge  Left eye: No discharge  Conjunctiva/sclera: Conjunctivae normal    Cardiovascular:      Rate and Rhythm: Normal rate and regular rhythm        Pulses: Normal pulses  Heart sounds: No murmur heard  Pulmonary:      Effort: Pulmonary effort is normal       Breath sounds: Normal breath sounds  Abdominal:      General: Abdomen is flat  There is no distension  Tenderness: There is no abdominal tenderness  Musculoskeletal:         General: Normal range of motion  Cervical back: Normal range of motion  Skin:     General: Skin is warm  Capillary Refill: Capillary refill takes less than 2 seconds  Findings: No rash  Neurological:      General: No focal deficit present  Mental Status: She is alert  Mental status is at baseline  Psychiatric:         Mood and Affect: Mood normal          Behavior: Behavior normal          Vital Signs  ED Triage Vitals [05/08/22 1034]   Temperature Pulse Respirations Blood Pressure SpO2   98 1 °F (36 7 °C) 98 18 150/90 99 %      Temp Source Heart Rate Source Patient Position - Orthostatic VS BP Location FiO2 (%)   Oral Monitor -- Right arm --      Pain Score       No Pain           Vitals:    05/08/22 1034 05/08/22 1150   BP: 150/90 137/86   Pulse: 98 (!) 112         Visual Acuity      ED Medications  Medications - No data to display    Diagnostic Studies  Results Reviewed     Procedure Component Value Units Date/Time    UA w Reflex to Microscopic w Reflex to Culture [[id number]]  (Abnormal) Collected: 05/08/22 1208    Lab Status: Final result Specimen: Urine, Clean Catch Updated: 05/08/22 1216     Color, UA Straw     Clarity, UA Clear     Specific Gravity, UA <=1 005     pH, UA 7 0     Leukocytes, UA Negative     Nitrite, UA Negative     Protein, UA Negative mg/dl      Glucose, UA Negative mg/dl      Ketones, UA Negative mg/dl      Urobilinogen, UA 0 2 E U /dl      Bilirubin, UA Negative     Blood, UA Negative     URINE COMMENT --    Urine culture [[id number]] Collected: 05/08/22 1208    Lab Status:  In process Specimen: Urine, Clean Catch Updated: 05/08/22 1216    hCG, quantitative [[id number]]  (Abnormal) Collected: 05/08/22 1058    Lab Status: Final result Specimen: Blood from Arm, Right Updated: 05/08/22 1212     HCG, Quant 131,530 mIU/mL     Narrative:       Expected Ranges:     Approximate               Approximate HCG  Gestation age          Concentration ( mIU/mL)  _____________          ______________________   Conda Miri                      HCG values  0 2-1                       5-50  1-2                           2-3                         100-5000  3-4                         500-16642  4-5                         1000-86464  5-6                         [id number]  6-8                         [id number]  8-12                        58555-174772      Comprehensive metabolic panel [[id number]]  (Abnormal) Collected: 05/08/22 1058    Lab Status: Final result Specimen: Blood from Arm, Right Updated: 05/08/22 1137     Sodium 136 mmol/L      Potassium 3 9 mmol/L      Chloride 106 mmol/L      CO2 20 mmol/L      ANION GAP 10 mmol/L      BUN 9 mg/dL      Creatinine 0 60 mg/dL      Glucose 79 mg/dL      Calcium 9 2 mg/dL      AST 15 U/L      ALT 17 U/L      Alkaline Phosphatase 38 U/L      Total Protein 6 9 g/dL      Albumin 4 1 g/dL      Total Bilirubin 0 26 mg/dL      eGFR 123 ml/min/1 73sq m     Narrative:      [place] guidelines for Chronic Kidney Disease (CKD):     Stage 1 with normal or high GFR (GFR > 90 mL/min/1 73 square meters)    Stage 2 Mild CKD (GFR = 60-89 mL/min/1 73 square meters)    Stage 3A Moderate CKD (GFR = 45-59 mL/min/1 73 square meters)    Stage 3B Moderate CKD (GFR = 30-44 mL/min/1 73 square meters)    Stage 4 Severe CKD (GFR = 15-29 mL/min/1 73 square meters)    Stage 5 End Stage CKD (GFR <15 mL/min/1 73 square meters)  Note: GFR calculation is accurate only with a steady state creatinine    CBC and differential [[id number]] Collected: 05/08/22 1058    Lab Status: Final result Specimen: Blood from Arm, Right Updated: 05/08/22 1105     WBC 7 10 Thousand/uL      RBC 4 07 Million/uL      Hemoglobin 12 7 g/dL      Hematocrit 37 1 %      MCV 91 fL      MCH 31 2 pg      MCHC 34 2 g/dL      RDW 13 2 %      MPV 9 7 fL      Platelets 219 Thousands/uL      nRBC 0 /100 WBCs      Neutrophils Relative 69 %      Immat GRANS % 0 %      Lymphocytes Relative 23 %      Monocytes Relative 7 %      Eosinophils Relative 1 %      Basophils Relative 0 %      Neutrophils Absolute 4 87 Thousands/µL      Immature Grans Absolute 0 02 Thousand/uL      Lymphocytes Absolute 1 61 Thousands/µL      Monocytes Absolute 0 50 Thousand/µL      Eosinophils Absolute 0 09 Thousand/µL      Basophils Absolute 0 01 Thousands/µL                  US OB Follow up with Transvaginal   Final Result by Cristopher Trevino MD (05/08 2545)      Dichorionic diamniotic twin gestation with twin A predominantly to the right of twin B within the retroverted uterus  Note that this designation may be different than the prior ultrasound  Twin A is a live intrauterine gestation measuring 10 weeks and 5 days by crown-rump length  Twin B measures 9 weeks and 5 days by crown-rump length though no cardiac activity could be identified consistent with fetal demise  The study was marked in [facility] for immediate notification  Workstation performed: [id number]                    Procedures  Procedures         ED Course                                             MDM  Number of Diagnoses or Management Options  Abdominal cramping: new and requires workup  Fetal demise before 20 weeks with retention of dead fetus: new and requires workup  Pregnancy: new and requires workup  Diagnosis management comments: 72-year-old female approximate time and half weeks pregnant  Reports decreased pregnancy symptoms such as nausea, vomiting, abdominal distention  Will evaluate for fetal demise  Ultrasound with loss of 1 of the 2 fetuses  Patient informed of the findings  Patient also reports dysuria    No UTI    Will discharge home  Follow-up with Ob  Return precautions given  Amount and/or Complexity of Data Reviewed  Clinical lab tests: ordered and reviewed  Tests in the radiology section of CPT®: ordered and reviewed  Review and summarize past medical records: yes  Independent visualization of images, tracings, or specimens: yes    Risk of Complications, Morbidity, and/or Mortality  Presenting problems: moderate  Diagnostic procedures: moderate  Management options: moderate        Disposition  Final diagnoses:   Fetal demise before 20 weeks with retention of dead fetus   Pregnancy   Abdominal cramping     Time reflects when diagnosis was documented in both MDM as applicable and the Disposition within this note     Time User Action Codes Description Comment    5/8/2022 12:09 PM Sabrina Ambrocio Add [O02 1] Fetal demise before 20 weeks with retention of dead fetus     5/8/2022 12:09 PM Sabrina Ambrocio Add [Z34 90] Pregnancy     5/8/2022 12:09 PM Rene Mighty [O02 1] Fetal demise before 20 weeks with retention of dead fetus     5/8/2022 12:09 PM Rene Mighty [Z34 90] Pregnancy     5/8/2022 12:09 PM Sabrina Ambrocio Add [R10 9] Abdominal cramping       ED Disposition     ED Disposition Condition Date/Time Comment    Discharge Stable Sun May 8, 2022 12:24 PM Juliette Nagel discharge to home/self care              Follow-up Information     Follow up With Specialties Details Why Contact Info Additional [address] [facility]  Obstetrics and Gynecology Schedule an appointment as soon as possible for a visit  For re-evaluation as soon as possible Alley Quiros [address] [place] [phone]  [address] [place] [address], [address] [place] [address] [address]    R Mason Torres 114 Emergency Department Emergency Medicine  If symptoms worsen [facility] [address] [phone] 9[facility] Emergency Department, [address], [address] [address]          Discharge Medication List as of 5/8/2022 12:26 PM      CONTINUE these medications which have NOT CHANGED    Details   ALPRAZolam (XANAX) 0 25 mg tablet Take 1 tablet (0 25 mg total) by mouth 3 (three) times a day as needed for anxiety One tab po q 8 hr prn anxiety, Starting Thu 7/23/2020, Normal      dicyclomine (BENTYL) 20 mg tablet TAKE 1 TABLET (20 MG TOTAL) BY MOUTH 3 (THREE) TIMES A DAY AS NEEDED (ABDOMINAL PAIN), Starting Tue 11/2/2021, Until Thu 12/2/2021 at 2359, Normal      Prenatal Vit-Fe Fumarate-FA (PRENATAL 19 PO) Take by mouth, Historical Med      sertraline (ZOLOFT) 50 mg tablet TAKE 1 TABLET DAILY, Normal             No discharge procedures on file      PDMP Review       Value Time User    PDMP Reviewed  Yes 7/23/2020 10:12 AM Randa Peña MD          ED Provider  Electronically Signed by           Tamera Mariee DO  05/08/22 0658

## 2022-05-08 NOTE — DISCHARGE INSTRUCTIONS
Follow-up with Obstetrics as soon as possible  Come back to the emergency department if you have a large amount of vaginal bleeding, shortness of breath, loss of color in your face related to blood loss

## 2022-05-09 ENCOUNTER — INITIAL PRENATAL (OUTPATIENT)
Dept: OBGYN CLINIC | Facility: CLINIC | Age: 32
End: 2022-05-09

## 2022-05-09 VITALS
WEIGHT: 187 LBS | DIASTOLIC BLOOD PRESSURE: 86 MMHG | SYSTOLIC BLOOD PRESSURE: 148 MMHG | BODY MASS INDEX: 27.7 KG/M2 | HEIGHT: 69 IN

## 2022-05-09 DIAGNOSIS — O31.10X0 VANISHING TWIN SYNDROME: ICD-10-CM

## 2022-05-09 DIAGNOSIS — O30.049 DICHORIONIC DIAMNIOTIC TWIN PREGNANCY, ANTEPARTUM: Primary | ICD-10-CM

## 2022-05-09 PROCEDURE — PNV: Performed by: PHYSICIAN ASSISTANT

## 2022-05-09 RX ORDER — SACCHAROMYCES BOULARDII 250 MG
250 CAPSULE ORAL 2 TIMES DAILY
COMMUNITY

## 2022-05-09 NOTE — PROGRESS NOTES
Pt presents today as a NP  She is a follow up to the ED from yesterday  She conceived a spontaneous twin pregnancy w a LMP of 2/24/22  Yesterday she presented to the ED as she was concerned because she was feeling better, without pregnancy sx and she was having some mild abdominal cramping  She denied vaginal bleeding or LOF  In the ED had US done:  IMPRESSION:     Dichorionic diamniotic twin gestation with twin A predominantly to the right of twin B within the retroverted uterus  Note that this designation may be different than the prior ultrasound      Twin A is a live intrauterine gestation measuring 10 weeks and 5 days by crown-rump length       Twin B measures 9 weeks and 5 days by crown-rump length though no cardiac activity could be identified consistent with fetal demise  In office today:  CRL baby A c/w 39 mm 10w6d + FHR @ 175 bpm  Baby B CRL c/w 26mm 9w3d no FHR noted c/w vanishing twin     We reviewed the diagnosis of vanishing twin  We reviewed that although baby B has stopped developing and no FHR noted, that baby A has a good FHR of 176bpm, and + FM noted  Pt will plan to establish w MFM for genetic testing and monitoring throughout the pregnancy as well  Advised pt to call with any bleeding or vaginal complaints, any pelvic pain or cramping as well  She has had pap and STD cx's done as well as initial OB panel w her previous OB   She has also had carrier screening done for CF and SMA in the fall 2021 and was not a carrier

## 2022-05-10 LAB
BACTERIA UR CULT: ABNORMAL
BACTERIA UR CULT: ABNORMAL

## 2022-05-11 ENCOUNTER — TELEPHONE (OUTPATIENT)
Dept: LABOR AND DELIVERY | Facility: HOSPITAL | Age: 32
End: 2022-05-11

## 2022-05-11 NOTE — TELEPHONE ENCOUNTER
Patient called and UA / UCx results were reviewed- discussed that Group B strep is typically vaginal colonization and not usually treated unless > 100CFU  Is having some cramping but not associated with any vaginal discharge or vaginal bleeidng  Reviewed that she will need to be swabbed at her 36 week appointment for sensitivities on GBS given PCN allergy and that she will need to be treated in labor for GBS  All questions and concerns addressed and encouraged patient to return call to office with any concerns

## 2022-05-17 ENCOUNTER — TELEPHONE (OUTPATIENT)
Dept: PERINATAL CARE | Facility: CLINIC | Age: 32
End: 2022-05-17

## 2022-05-17 NOTE — TELEPHONE ENCOUNTER
Rafaela Low left a VMM on the nurse line inquiring about her upcoming Nuchal Appointment and NIPT on Vanishing Twin  Spoke with Rafaela Low, she was concern if she decides to do NIPT will affect her NIPT results? I offered Genetic Counseling prior to her Nuchal appointment or can discuss it at her Nuchal appointment  with [facility] doctors  Juliette gaines Genetic counseling, call transferred to  for further assistance

## 2022-05-18 ENCOUNTER — ROUTINE PRENATAL (OUTPATIENT)
Dept: OBGYN CLINIC | Facility: CLINIC | Age: 32
End: 2022-05-18

## 2022-05-18 VITALS
DIASTOLIC BLOOD PRESSURE: 80 MMHG | WEIGHT: 189.4 LBS | HEIGHT: 69 IN | BODY MASS INDEX: 28.05 KG/M2 | SYSTOLIC BLOOD PRESSURE: 122 MMHG

## 2022-05-18 DIAGNOSIS — Z3A.11 11 WEEKS GESTATION OF PREGNANCY: ICD-10-CM

## 2022-05-18 DIAGNOSIS — Z34.01 ENCOUNTER FOR SUPERVISION OF NORMAL FIRST PREGNANCY IN FIRST TRIMESTER: Primary | ICD-10-CM

## 2022-05-18 PROCEDURE — PNV: Performed by: PHYSICIAN ASSISTANT

## 2022-05-18 RX ORDER — LANOLIN ALCOHOL/MO/W.PET/CERES
400 CREAM (GRAM) TOPICAL DAILY
COMMUNITY
End: 2022-07-19

## 2022-05-18 NOTE — PROGRESS NOTES
OB Problem: present with  and mom - highly anxious due to history of miscarriage last Fall and vanishing twin this pregnancy; (+) n - starting to lesson which is making her nervous; (+) cramping - history of back surgery and chronic back pain - had back pain over the weekend which seems to be getting better; Also feeling mild pelvic cramping that comes and goes; Denies v/HA/vb/lof/edema/dv/smoking; urine neg/neg  Pregnancy originally naturally conceived Di/di twins with demise of fetus B/vanishing twin;   PNVs + DHA - tolerating daily  No FM yet    TA u/s done - single viable IUP measuring 64 1 mm by CRL at 12w6d with (+) FM visualized and (+) FHM of 158 bpm  Evidence of a significantly smaller second sac with blurry fetal pole measuring around 8 weeks by CRL  Patient and family reassured  Reviewed warning signs and when to call  Reviewed typical symptoms experienced in the beginning of the pregnancy  Reviewed exercise and sex in pregnancy, weight gain, diet, caffeine intake  First trimester PNC screening ultrasound scheduled  All questions answered at this time  Patient to call with any questions/concerns    RTO for routine ob check as scheduled or sooner if needed

## 2022-05-24 ENCOUNTER — ROUTINE PRENATAL (OUTPATIENT)
Dept: PERINATAL CARE | Facility: OTHER | Age: 32
End: 2022-05-24
Payer: COMMERCIAL

## 2022-05-24 VITALS
DIASTOLIC BLOOD PRESSURE: 86 MMHG | SYSTOLIC BLOOD PRESSURE: 139 MMHG | HEIGHT: 69 IN | BODY MASS INDEX: 27.91 KG/M2 | WEIGHT: 188.4 LBS | HEART RATE: 99 BPM

## 2022-05-24 DIAGNOSIS — O31.10X0 VANISHING TWIN SYNDROME: ICD-10-CM

## 2022-05-24 DIAGNOSIS — Z36.82 ENCOUNTER FOR NUCHAL TRANSLUCENCY TESTING: Primary | ICD-10-CM

## 2022-05-24 DIAGNOSIS — Z3A.12 12 WEEKS GESTATION OF PREGNANCY: ICD-10-CM

## 2022-05-24 DIAGNOSIS — O30.049 DICHORIONIC DIAMNIOTIC TWIN PREGNANCY, ANTEPARTUM: ICD-10-CM

## 2022-05-24 PROCEDURE — 1036F TOBACCO NON-USER: CPT | Performed by: OBSTETRICS & GYNECOLOGY

## 2022-05-24 PROCEDURE — 76813 OB US NUCHAL MEAS 1 GEST: CPT | Performed by: OBSTETRICS & GYNECOLOGY

## 2022-05-24 PROCEDURE — 76801 OB US < 14 WKS SINGLE FETUS: CPT | Performed by: OBSTETRICS & GYNECOLOGY

## 2022-05-24 PROCEDURE — 36415 COLL VENOUS BLD VENIPUNCTURE: CPT | Performed by: OBSTETRICS & GYNECOLOGY

## 2022-05-24 PROCEDURE — 3008F BODY MASS INDEX DOCD: CPT | Performed by: OBSTETRICS & GYNECOLOGY

## 2022-05-24 PROCEDURE — 99241 PR OFFICE CONSULTATION NEW/ESTAB PATIENT 15 MIN: CPT | Performed by: OBSTETRICS & GYNECOLOGY

## 2022-05-24 RX ORDER — SERTRALINE HYDROCHLORIDE 25 MG/1
25 TABLET, FILM COATED ORAL DAILY
COMMUNITY

## 2022-05-24 NOTE — PROGRESS NOTES
Patient chose to have [facility] Non-invasive Prenatal Screen  Patient given brochure and is aware [facility] will contact patients insurance and coordinate coverage  Patient made aware she will need to respond to text message or e-mail from [employer] within 2 business days or testing will be run through insurance  Patient informed text message will come from area code  "415"  Provided [employer] # [phone] and web site : [email or web address] [email or web address]     2 vials of blood drawn from Rt  arm, patient tolerated blood draw without difficulty  Specimens labeled with patient identifiers (name, date of birth, specimen collection date), packed and sent via [email or web address]  Copy of lab order scanned to Epic media  Maternal Fetal Medicine will have results in approximately 7-10 business days and will call patient or notify via [address]  Patient aware viewing lab result online will reveal fetal sex If ordered  Patient verbalized understanding of all instructions and no questions at this time

## 2022-05-24 NOTE — PROGRESS NOTES
CONSULT NOTE    Madiha Bustamante PA-C  [address] [facility]  [facility]     Thank you for referring your Melissa Fleming for a Maternal-Fetal Medicine Consultation:  Below is my consultation  Thank you very much for requesting a consultation on this very nice patient for the indication of genetic screening  This is the patient's 2nd pregnancy  Her 1st pregnancy last year resulted in a 6 week miscarriage requiring a D&C  This pregnancy was spontaneously conceived dichorionic twins with Florestine Penta demise at around 10 weeks  She has a history of back surgery and celiac disease  She currently takes Bentyl, folic acid, and prenatal vitamins  She has a history of an allergy to amoxicillin which causes a rash, gluten, and hyoscyamine  Substance use history is unremarkable  Family history is significant for her father with type 2 diabetes and her mother who conceived a fetus which resulted in a miscarriage from trisomy 12  A review of systems is otherwise negative  We discussed the options for genetic screening, including but not limited to first trimester screening, second trimester screening, combined first and second trimester screening, noninvasive prenatal screening (NIPS) for patients at high risk and diagnostic screening through the use of CVS and amniocentesis  We discussed the risks and benefits of each approach including the sensitivities and false positive rates as well as the difference between a screening test and a diagnostic test   We reviewed the limitations given that this is a twin pregnancy with Florestine Penta demise approximately 3 weeks ago  At the conclusion of our discussion the patient elected noninvasive prenatal testing utilizing the [facility] Non-invasive prenatal screening (NIPS) test   The patient had this blood work drawn in the office and the results should be available approximately 7-10 days after her blood draw  Her results will be reported from [facility]       We reviewed outcomes for early single demise in a twin pregnancy  We discussed that outcomes generally approach that of a single pregnancy as the pregnancy progresses  We discussed follow-up in detail and I recommend an anatomy ultrasound be scheduled for 20 weeks gestation  Thank you very much for allowing us to participate in the care of this very nice patient  Should you have any questions, please do not hesitate to contact our office  Please note, in addition to the time spent discussing the results of the ultrasound, I spent approximately 15 minutes of face-to-face time with the patient, greater than 50% of which was spent in counseling and the coordination of care for this patient  Portions of the record may have been created with voice recognition software  Occasional wrong word or "sound a like" substitutions may have occurred due to the inherent limitations of voice recognition software  Read the chart carefully and recognize, using context, where substitutions have occurred  Samuel Sandoval MD  Attending Physician, [place]

## 2022-05-24 NOTE — LETTER
May 24, 2022     Lyle Valero PA-C  [address]  [facility] [address]    Patient: Raymundo Nagel   YOB: 1990   Date of Visit: 5/24/2022       Dear Dr Ad Wheeler: Thank you for referring Arlet Ulloa to me for evaluation  Below are my notes for this consultation  If you have questions, please do not hesitate to call me  I look forward to following your patient along with you  Sincerely,        Noelle Landeros MD        CC: No Recipients  Noelle Landeros MD  5/24/2022  1:46 PM  Sign when Signing Visit  CONSULT NOTE    Lyle Valero PA-C  [address]  [place],  [facility]     Thank you for referring your Judithann Lipoma for a Maternal-Fetal Medicine Consultation:  Below is my consultation  Thank you very much for requesting a consultation on this very nice patient for the indication of genetic screening  This is the patient's 2nd pregnancy  Her 1st pregnancy last year resulted in a 6 week miscarriage requiring a D&C  This pregnancy was spontaneously conceived dichorionic twins with Adam Padilla demise at around 10 weeks  She has a history of back surgery and celiac disease  She currently takes Bentyl, folic acid, and prenatal vitamins  She has a history of an allergy to amoxicillin which causes a rash, gluten, and hyoscyamine  Substance use history is unremarkable  Family history is significant for her father with type 2 diabetes and her mother who conceived a fetus which resulted in a miscarriage from trisomy 12  A review of systems is otherwise negative  We discussed the options for genetic screening, including but not limited to first trimester screening, second trimester screening, combined first and second trimester screening, noninvasive prenatal screening (NIPS) for patients at high risk and diagnostic screening through the use of CVS and amniocentesis    We discussed the risks and benefits of each approach including the sensitivities and false positive rates as well as the difference between a screening test and a diagnostic test   We reviewed the limitations given that this is a twin pregnancy with Tasha Carrasco demise approximately 3 weeks ago  At the conclusion of our discussion the patient elected noninvasive prenatal testing utilizing the [employer] Non-invasive prenatal screening (NIPS) test   The patient had this blood work drawn in the office and the results should be available approximately 7-10 days after her blood draw  [facility] results will be [facility]  We reviewed outcomes for early single demise in a twin pregnancy  We discussed that outcomes generally approach that of a single pregnancy as the pregnancy progresses  We discussed follow-up in detail and I recommend an anatomy ultrasound be scheduled for 20 weeks gestation  Thank you very much for allowing us to participate in the care of this very nice patient  Should you have any questions, please do not hesitate to contact our office  Please note, in addition to the time spent discussing the results of the ultrasound, I spent approximately 15 minutes of face-to-face time with the patient, greater than 50% of which was spent in counseling and the coordination of care for this patient  Portions of the record may have been created with voice recognition software  Occasional wrong word or "sound a like" substitutions may have occurred due to the inherent limitations of voice recognition software  Read the chart carefully and recognize, using context, where substitutions have occurred  Samuel Randolph MD  Attending Physician, [place]

## 2022-05-31 ENCOUNTER — TELEPHONE (OUTPATIENT)
Dept: PERINATAL CARE | Facility: CLINIC | Age: 32
End: 2022-05-31

## 2022-05-31 NOTE — TELEPHONE ENCOUNTER
----- Message from Nita Ibarra MD sent at 5/31/2022  8:25 AM EDT -----  I have reviewed the results of the NIPS which are low risk  Please call patient and notify her of reassuring results if she has not viewed on MyChart  Please ensure she is notified of recommendation of MSAFP to be ordered and followed up through her primary Obstetrician's office  Thank you

## 2022-05-31 NOTE — TELEPHONE ENCOUNTER
Patient called with questions regarding her negative NIPT result and accuracy if there was a vanishing twin  We discussed that it can affect the positive predictive value, but is more reassuring when there is a negative result  Reviewed that it is still a screening test and the possibility of false negatives and false positive  Diagnostic testing is available if she remains concerned, which she declined at this time  All of the patient's questions were answered to her reported satisfaction  The genetic counseling phone number was provided for any further questions or concerns

## 2022-05-31 NOTE — TELEPHONE ENCOUNTER
Spoke with pt and provided her with the results of the NIPS, gender NOT provided  PT instructed on MSAFP being ordered by OB and timing of the test   PT had further questions r/t NIPS results and requested to speak with genetic counselor- call forwarded to Ene Riley, genetic counselor

## 2022-06-07 ENCOUNTER — ROUTINE PRENATAL (OUTPATIENT)
Dept: OBGYN CLINIC | Facility: CLINIC | Age: 32
End: 2022-06-07
Payer: COMMERCIAL

## 2022-06-07 VITALS
BODY MASS INDEX: 28.29 KG/M2 | DIASTOLIC BLOOD PRESSURE: 82 MMHG | WEIGHT: 191 LBS | SYSTOLIC BLOOD PRESSURE: 138 MMHG | HEIGHT: 69 IN

## 2022-06-07 DIAGNOSIS — R35.0 FREQUENT URINATION: Primary | ICD-10-CM

## 2022-06-07 DIAGNOSIS — Z34.02 ENCOUNTER FOR SUPERVISION OF NORMAL FIRST PREGNANCY IN SECOND TRIMESTER: ICD-10-CM

## 2022-06-07 LAB
SL AMB  POCT GLUCOSE, UA: NORMAL
SL AMB LEUKOCYTE ESTERASE,UA: NORMAL
SL AMB POCT BILIRUBIN,UA: NORMAL
SL AMB POCT BLOOD,UA: NORMAL
SL AMB POCT COLOR,UA: NORMAL
SL AMB POCT KETONES,UA: NORMAL
SL AMB POCT NITRITE,UA: NORMAL
SL AMB POCT PH,UA: 7.5
SL AMB POCT SPECIFIC GRAVITY,UA: 1
SL AMB POCT URINE PROTEIN: NORMAL
SL AMB POCT UROBILINOGEN: 0.2

## 2022-06-07 PROCEDURE — 81002 URINALYSIS NONAUTO W/O SCOPE: CPT | Performed by: PHYSICIAN ASSISTANT

## 2022-06-07 PROCEDURE — PNV: Performed by: PHYSICIAN ASSISTANT

## 2022-06-07 PROCEDURE — 87086 URINE CULTURE/COLONY COUNT: CPT | Performed by: PHYSICIAN ASSISTANT

## 2022-06-07 NOTE — PROGRESS NOTES
Visit: Reports headaches, mild  No FM, N/V, cramping, VB, LOF, edema, domestic violence, or smoking  Tolerating PNV  Has level II ultrasound scheduled  Script for AFP given  Continue routine prenatal care  Return to office in 4 weeks for ob check

## 2022-06-09 LAB — BACTERIA UR CULT: NORMAL

## 2022-06-22 ENCOUNTER — APPOINTMENT (OUTPATIENT)
Dept: LAB | Facility: MEDICAL CENTER | Age: 32
End: 2022-06-22
Payer: COMMERCIAL

## 2022-06-22 DIAGNOSIS — Z34.02 ENCOUNTER FOR SUPERVISION OF NORMAL FIRST PREGNANCY IN SECOND TRIMESTER: ICD-10-CM

## 2022-06-22 PROCEDURE — 82105 ALPHA-FETOPROTEIN SERUM: CPT

## 2022-06-22 PROCEDURE — 36415 COLL VENOUS BLD VENIPUNCTURE: CPT

## 2022-06-27 ENCOUNTER — TELEPHONE (OUTPATIENT)
Dept: OBGYN CLINIC | Facility: CLINIC | Age: 32
End: 2022-06-27

## 2022-06-27 NOTE — TELEPHONE ENCOUNTER
Pt aware does not need to repeat afp and in process at this time, can take longer than normal routine blood work  Pt has no further questions at this time

## 2022-06-27 NOTE — TELEPHONE ENCOUNTER
Pt lmom - calling about AFP had done about 5 days ago and hasn't gotten results yet so not sure if normal or if she needs to repeat the test

## 2022-06-28 ENCOUNTER — TELEPHONE (OUTPATIENT)
Dept: OBGYN CLINIC | Facility: CLINIC | Age: 32
End: 2022-06-28

## 2022-06-28 DIAGNOSIS — R77.2 ABNORMAL ALPHA FETOPROTEIN (AFP) LEVEL: Primary | ICD-10-CM

## 2022-06-28 LAB
2ND TRIMESTER 4 SCREEN SERPL-IMP: ABNORMAL
AFP ADJ MOM SERPL: 2.52
AFP INTERP AMN-IMP: ABNORMAL
AFP INTERP SERPL-IMP: ABNORMAL
AFP INTERP SERPL-IMP: ABNORMAL
AFP SERPL-MCNC: 85.1 NG/ML
AGE AT DELIVERY: 30.5 YR
GA METHOD: ABNORMAL
GA: 16.9 WEEKS
IDDM PATIENT QL: NO
MULTIPLE PREGNANCY: NO
NEURAL TUBE DEFECT RISK FETUS: 277 %

## 2022-06-28 NOTE — TELEPHONE ENCOUNTER
Did review with patient mom and patient, they called in again  Patient was very emotional and reviewed thoughts on AFP  Tried to call [facility] for patient to move up ultrasound but was closed  Sent high priority staff message to [facility] clerical and clinical coordinator to try to get apt made  Referral placed for new findings  Patient still has questions and would like a call back to explain the lab work

## 2022-06-28 NOTE — TELEPHONE ENCOUNTER
Reviewed with Dr Venancio Dawn in length, recommends follow up with [facility]  May be secondary to early twin loss  Reviewed results with patient and patient's mother in length  Patient will call  center tomorrow to see if she can get her Level II ultrasound moved up

## 2022-06-28 NOTE — TELEPHONE ENCOUNTER
The screen did come back positive  I reviewed it with Dr Dorian Hernández and she believes it is likely secondary to vanishing twin pregnancy  Recommends following up with [facility] for ultrasound evaluation of neural tube  I can call her to answer any other questions and concerns, I just won't be able to until I'm done seeing patient's at 6pm, hopefully you can reassure her some before then  Let me know if she has any other questions

## 2022-06-29 ENCOUNTER — HOSPITAL ENCOUNTER (EMERGENCY)
Facility: HOSPITAL | Age: 32
Discharge: HOME | End: 2022-06-29
Attending: EMERGENCY MEDICINE | Admitting: EMERGENCY MEDICINE
Payer: COMMERCIAL

## 2022-06-29 VITALS
HEIGHT: 69 IN | SYSTOLIC BLOOD PRESSURE: 143 MMHG | BODY MASS INDEX: 28.28 KG/M2 | RESPIRATION RATE: 18 BRPM | WEIGHT: 190.92 LBS | OXYGEN SATURATION: 100 % | DIASTOLIC BLOOD PRESSURE: 81 MMHG | HEART RATE: 90 BPM | TEMPERATURE: 97.8 F

## 2022-06-29 DIAGNOSIS — R07.9 CHEST PAIN: ICD-10-CM

## 2022-06-29 DIAGNOSIS — R07.9 CHEST PAIN DURING PREGNANCY: Primary | ICD-10-CM

## 2022-06-29 DIAGNOSIS — Z3A.12 12 WEEKS GESTATION OF PREGNANCY: Primary | ICD-10-CM

## 2022-06-29 DIAGNOSIS — O99.891 CHEST PAIN DURING PREGNANCY: Primary | ICD-10-CM

## 2022-06-29 LAB
ALBUMIN SERPL BCP-MCNC: 4 G/DL (ref 3.5–5)
ALP SERPL-CCNC: 45 U/L (ref 34–104)
ALT SERPL W P-5'-P-CCNC: 27 U/L (ref 7–52)
ANION GAP SERPL CALCULATED.3IONS-SCNC: 6 MMOL/L (ref 4–13)
AST SERPL W P-5'-P-CCNC: 17 U/L (ref 13–39)
ATRIAL RATE: 95 BPM
BASOPHILS # BLD AUTO: 0.03 THOUSANDS/ΜL (ref 0–0.1)
BASOPHILS NFR BLD AUTO: 0 % (ref 0–1)
BILIRUB SERPL-MCNC: 0.22 MG/DL (ref 0.2–1)
BILIRUB UR QL STRIP: NEGATIVE
BUN SERPL-MCNC: 11 MG/DL (ref 5–25)
CALCIUM SERPL-MCNC: 9.2 MG/DL (ref 8.4–10.2)
CARDIAC TROPONIN I PNL SERPL HS: <2 NG/L
CARDIAC TROPONIN I PNL SERPL HS: <2 NG/L
CHLORIDE SERPL-SCNC: 107 MMOL/L (ref 96–108)
CLARITY UR: CLEAR
CO2 SERPL-SCNC: 22 MMOL/L (ref 21–32)
COLOR UR: YELLOW
CREAT SERPL-MCNC: 0.67 MG/DL (ref 0.6–1.3)
D DIMER PPP FEU-MCNC: 0.35 UG/ML FEU
EOSINOPHIL # BLD AUTO: 0.08 THOUSAND/ΜL (ref 0–0.61)
EOSINOPHIL NFR BLD AUTO: 1 % (ref 0–6)
ERYTHROCYTE [DISTWIDTH] IN BLOOD BY AUTOMATED COUNT: 13 % (ref 11.6–15.1)
GFR SERPL CREATININE-BSD FRML MDRD: 118 ML/MIN/1.73SQ M
GLUCOSE SERPL-MCNC: 82 MG/DL (ref 65–140)
GLUCOSE UR STRIP-MCNC: NEGATIVE MG/DL
HCT VFR BLD AUTO: 35.9 % (ref 34.8–46.1)
HGB BLD-MCNC: 12.2 G/DL (ref 11.5–15.4)
HGB UR QL STRIP.AUTO: NEGATIVE
IMM GRANULOCYTES # BLD AUTO: 0.05 THOUSAND/UL (ref 0–0.2)
IMM GRANULOCYTES NFR BLD AUTO: 1 % (ref 0–2)
KETONES UR STRIP-MCNC: NEGATIVE MG/DL
LEUKOCYTE ESTERASE UR QL STRIP: NEGATIVE
LYMPHOCYTES # BLD AUTO: 1.69 THOUSANDS/ΜL (ref 0.6–4.47)
LYMPHOCYTES NFR BLD AUTO: 19 % (ref 14–44)
MCH RBC QN AUTO: 32.2 PG (ref 26.8–34.3)
MCHC RBC AUTO-ENTMCNC: 34 G/DL (ref 31.4–37.4)
MCV RBC AUTO: 95 FL (ref 82–98)
MONOCYTES # BLD AUTO: 0.55 THOUSAND/ΜL (ref 0.17–1.22)
MONOCYTES NFR BLD AUTO: 6 % (ref 4–12)
NEUTROPHILS # BLD AUTO: 6.36 THOUSANDS/ΜL (ref 1.85–7.62)
NEUTS SEG NFR BLD AUTO: 73 % (ref 43–75)
NITRITE UR QL STRIP: NEGATIVE
NRBC BLD AUTO-RTO: 0 /100 WBCS
P AXIS: 48 DEGREES
PH UR STRIP.AUTO: 6 [PH] (ref 4.5–8)
PLATELET # BLD AUTO: 263 THOUSANDS/UL (ref 149–390)
PMV BLD AUTO: 9.8 FL (ref 8.9–12.7)
POTASSIUM SERPL-SCNC: 3.8 MMOL/L (ref 3.5–5.3)
PR INTERVAL: 136 MS
PROT SERPL-MCNC: 6.8 G/DL (ref 6.4–8.4)
PROT UR STRIP-MCNC: NEGATIVE MG/DL
QRS AXIS: 63 DEGREES
QRSD INTERVAL: 70 MS
QT INTERVAL: 346 MS
QTC INTERVAL: 426 MS
RBC # BLD AUTO: 3.79 MILLION/UL (ref 3.81–5.12)
SODIUM SERPL-SCNC: 135 MMOL/L (ref 135–147)
SP GR UR STRIP.AUTO: 1.02 (ref 1–1.03)
T WAVE AXIS: 34 DEGREES
UROBILINOGEN UR QL STRIP.AUTO: 0.2 E.U./DL
VENTRICULAR RATE: 91 BPM
WBC # BLD AUTO: 8.76 THOUSAND/UL (ref 4.31–10.16)

## 2022-06-29 PROCEDURE — 81003 URINALYSIS AUTO W/O SCOPE: CPT

## 2022-06-29 PROCEDURE — 93010 ELECTROCARDIOGRAM REPORT: CPT | Performed by: INTERNAL MEDICINE

## 2022-06-29 PROCEDURE — 80053 COMPREHEN METABOLIC PANEL: CPT | Performed by: EMERGENCY MEDICINE

## 2022-06-29 PROCEDURE — 96360 HYDRATION IV INFUSION INIT: CPT

## 2022-06-29 PROCEDURE — 87086 URINE CULTURE/COLONY COUNT: CPT

## 2022-06-29 PROCEDURE — 84484 ASSAY OF TROPONIN QUANT: CPT | Performed by: EMERGENCY MEDICINE

## 2022-06-29 PROCEDURE — 99285 EMERGENCY DEPT VISIT HI MDM: CPT

## 2022-06-29 PROCEDURE — 85025 COMPLETE CBC W/AUTO DIFF WBC: CPT | Performed by: EMERGENCY MEDICINE

## 2022-06-29 PROCEDURE — 85379 FIBRIN DEGRADATION QUANT: CPT | Performed by: EMERGENCY MEDICINE

## 2022-06-29 PROCEDURE — 99284 EMERGENCY DEPT VISIT MOD MDM: CPT | Performed by: EMERGENCY MEDICINE

## 2022-06-29 PROCEDURE — 96361 HYDRATE IV INFUSION ADD-ON: CPT

## 2022-06-29 PROCEDURE — 36415 COLL VENOUS BLD VENIPUNCTURE: CPT

## 2022-06-29 PROCEDURE — 93005 ELECTROCARDIOGRAM TRACING: CPT

## 2022-06-29 RX ADMIN — SODIUM CHLORIDE 1000 ML: 0.9 INJECTION, SOLUTION INTRAVENOUS at 19:16

## 2022-06-30 ENCOUNTER — ROUTINE PRENATAL (OUTPATIENT)
Dept: PERINATAL CARE | Facility: OTHER | Age: 32
End: 2022-06-30
Payer: COMMERCIAL

## 2022-06-30 VITALS
DIASTOLIC BLOOD PRESSURE: 84 MMHG | WEIGHT: 194 LBS | HEART RATE: 121 BPM | HEIGHT: 69 IN | BODY MASS INDEX: 28.73 KG/M2 | SYSTOLIC BLOOD PRESSURE: 137 MMHG

## 2022-06-30 DIAGNOSIS — Z36.86 ENCOUNTER FOR ANTENATAL SCREENING FOR CERVICAL LENGTH: ICD-10-CM

## 2022-06-30 DIAGNOSIS — Z36.3 ENCOUNTER FOR ANTENATAL SCREENING FOR MALFORMATIONS: ICD-10-CM

## 2022-06-30 DIAGNOSIS — O28.0 MSAFP (MATERNAL SERUM ALPHA-FETOPROTEIN) HIGH: Primary | ICD-10-CM

## 2022-06-30 PROCEDURE — 76817 TRANSVAGINAL US OBSTETRIC: CPT | Performed by: OBSTETRICS & GYNECOLOGY

## 2022-06-30 PROCEDURE — 76805 OB US >/= 14 WKS SNGL FETUS: CPT | Performed by: OBSTETRICS & GYNECOLOGY

## 2022-06-30 PROCEDURE — 3008F BODY MASS INDEX DOCD: CPT | Performed by: OBSTETRICS & GYNECOLOGY

## 2022-06-30 PROCEDURE — 99215 OFFICE O/P EST HI 40 MIN: CPT | Performed by: OBSTETRICS & GYNECOLOGY

## 2022-06-30 NOTE — PATIENT INSTRUCTIONS
Thank you for choosing us for your  care today  If you have any questions about your ultrasound or care, please do not hesitate to contact us or your primary obstetrician  Some general instructions for your pregnancy are:    Protect against coronavirus: get vaccinated - pregnant women are increased risk of severe COVID  Notify your primary care doctor if you have any symptoms  Exercise: Aim for 22 minutes per day (150 minutes per week) of regular exercise  Walking is great! Nutrition: aim for calcium-rich and iron-rich foods as well as healthy sources of protein  Learn about Preeclampsia: preeclampsia is a common, serious high blood pressure complication in pregnancy  A blood pressure of [id number] (systolic or top number) or [id number] (diastolic or bottom number) is not normal and needs evaluation by your doctor  Aspirin is sometimes prescribed in early pregnancy to prevent preeclampsia in women with risk factors - ask your obstetrician if you should be on this medication  If you smoke, try to reduce how many cigarettes you smoke or try to quit completely  Do not vape  Other warning signs to watch out for in pregnancy or postpartum: chest pain, obstructed breathing or shortness of breath, seizures, thoughts of hurting yourself or your baby, bleeding, a painful or swollen leg, fever, or headache (see [employer] POST-BIRTH Warning Signs campaign)  If these happen call 911  Itching is also not normal in pregnancy and if you experience this, especially over your hands and feet, potentially worse at night, notify your doctors

## 2022-06-30 NOTE — PROGRESS NOTES
Ultrasound Probe Disinfection    A transvaginal ultrasound was performed  Prior to use, disinfection was performed with High Level Disinfection Process (Trophon)  Probe serial number M2: [id number] was used        Ofe Rosado  06/30/22  8:40 AM

## 2022-06-30 NOTE — DISCHARGE INSTRUCTIONS
Your exam and testing was very reassuring  Please follow with Ob with your ultrasound tomorrow  Return to the ER with any worsening symptoms or new concerns  We are always here and always happy to re-evaluate!

## 2022-07-01 ENCOUNTER — ROUTINE PRENATAL (OUTPATIENT)
Dept: OBGYN CLINIC | Facility: CLINIC | Age: 32
End: 2022-07-01

## 2022-07-01 VITALS — BODY MASS INDEX: 28.5 KG/M2 | WEIGHT: 193 LBS | SYSTOLIC BLOOD PRESSURE: 120 MMHG | DIASTOLIC BLOOD PRESSURE: 70 MMHG

## 2022-07-01 DIAGNOSIS — Z3A.18 PREGNANCY WITH 18 COMPLETED WEEKS GESTATION: Primary | ICD-10-CM

## 2022-07-01 LAB — BACTERIA UR CULT: NORMAL

## 2022-07-01 PROCEDURE — PNV: Performed by: OBSTETRICS & GYNECOLOGY

## 2022-07-01 NOTE — PROGRESS NOTES
Patient reports some fm, no n/v, headache, cramping, bleeding, loss of fluid, edema, dom violence, or smoking  tonja pnv has pnc follow up, urine neg/neg  Return in 4 weeks or sooner as needed

## 2022-07-02 NOTE — ED PROVIDER NOTES
History  Chief Complaint   Patient presents with    Chest Pain     Patient sent by OB/Gyn for chest pain evaluation  Patient states she feels more nauseated than usual  Denies fevers, dizziness, and SOB  30 yo female at 16 weeks with twin pregnancy p/w retrosternal non radiating chest pain and anxiety  Pt reports h/o miscarriage last year and is currently undergoing evaluation for "vanishing twin" syndrome  She recently was made aware of an abnormal AFP test and is having a immense anxiety over the health/status of her pregnancy  Denies exertional component, associate SOB, LE pain/swelling, family h/o clotting disorder, URI symptoms, cough, f/c/, vaginal bleeding, cramping or other  symptoms  Pt states pain is improving but still present  No recent travel/surgery  Pt does not smoke/drink/do recreational drugs  History provided by:  Medical records and patient   used: No    Chest Pain  Pain location:  Substernal area  Pain quality: aching    Pain radiates to:  Does not radiate  Pain radiates to the back: no    Pain severity:  Moderate  Onset quality:  Sudden  Duration:  1 day  Timing:  Constant  Progression:  Improving  Chronicity:  New  Context: at rest and stress    Context: not breathing, not eating, not lifting, no movement, not raising an arm and no trauma    Relieved by:  Nothing  Worsened by:  Nothing tried  Ineffective treatments:  None tried  Associated symptoms: anxiety    Associated symptoms: no abdominal pain, no back pain, no cough, no diaphoresis, no dizziness, no fever, no lower extremity edema, no numbness, no orthopnea, no shortness of breath and no weakness    Risk factors: pregnancy    Risk factors: no aortic disease, no diabetes mellitus, no high cholesterol, no hypertension, no immobilization, not male, not obese, no prior DVT/PE and no smoking        Prior to Admission Medications   Prescriptions Last Dose Informant Patient Reported? Taking?    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 (-) type 18 (-) other HRHPV    Miscarriage        Past Surgical History:   Procedure Laterality Date    BACK SURGERY      discectomy L5-S1; last assessed: 07/07/2015    [facility]    IN COLONOSCOPY FLX DX W/[facility]LLJ [facility] WHEN PFRMD N/A 7/28/2016    Procedure: COLONOSCOPY;  Surgeon: Stone Meeks MD;  [facility]; Service: Gastroenterology       Family History   Problem Relation Age of Onset    Depression Mother     Hypertension Father     Diabetes Father     Depression Brother     Stroke Maternal Grandmother     Heart attack Paternal Grandmother      I have reviewed and agree with the history as documented  E-Cigarette/Vaping    E-Cigarette Use Never User      E-Cigarette/Vaping Substances    Nicotine No     THC No     CBD No     Flavoring No     Other No     Unknown No      Social History     Tobacco Use    Smoking status: Never Smoker    Smokeless tobacco: Never Used   Vaping Use    Vaping Use: Never used   Substance Use Topics    Alcohol use: Not Currently     Comment: socially (does not drink alcohol-as per [employer])    Drug use: No       Review of Systems   Constitutional: Negative for appetite change, chills, diaphoresis and fever  HENT: Negative for congestion, rhinorrhea and sore throat      Respiratory: Negative for cough and shortness of breath  Cardiovascular: Positive for chest pain  Negative for orthopnea  Gastrointestinal: Negative for abdominal pain  Genitourinary: Negative for dysuria, frequency, urgency, vaginal bleeding, vaginal discharge and vaginal pain  Musculoskeletal: Negative for back pain  Skin: Negative for rash  Neurological: Negative for dizziness, weakness and numbness  Psychiatric/Behavioral: The patient is nervous/anxious  All other systems reviewed and are negative  Physical Exam  Physical Exam  Vitals and nursing note reviewed  Constitutional:       Appearance: She is well-developed  She is not diaphoretic  Comments: Tearful, anxious   HENT:      Head: Normocephalic and atraumatic  Eyes:      Conjunctiva/sclera: Conjunctivae normal    Cardiovascular:      Rate and Rhythm: Normal rate and regular rhythm  Pulses:           Radial pulses are 2+ on the right side and 2+ on the left side  Dorsalis pedis pulses are 2+ on the right side and 2+ on the left side  Heart sounds: Normal heart sounds  No murmur heard  Pulmonary:      Effort: Pulmonary effort is normal  No respiratory distress  Breath sounds: Normal breath sounds  Abdominal:      General: Bowel sounds are normal       Palpations: Abdomen is soft  Tenderness: There is no abdominal tenderness  There is no guarding or rebound  Comments: Gravid uterus, fundal height to below umbilicus   Musculoskeletal:         General: No deformity  Normal range of motion  Cervical back: Normal range of motion  Right lower leg: No edema  Left lower leg: No edema  Skin:     General: Skin is warm and dry  Capillary Refill: Capillary refill takes less than 2 seconds  Neurological:      Mental Status: She is alert and oriented to person, place, and time  Psychiatric:         Mood and Affect: Mood is anxious  Behavior: Behavior normal          Thought Content:  Thought content normal          Judgment: Judgment normal          Vital Signs  ED Triage Vitals [06/29/22 1646]   Temperature Pulse Respirations Blood Pressure SpO2   97 8 °F (36 6 °C) 90 18 124/81 98 %      Temp Source Heart Rate Source Patient Position - Orthostatic VS BP Location FiO2 (%)   Oral Monitor Sitting Left arm --      Pain Score       No Pain           Vitals:    06/29/22 1646 06/29/22 1830 06/29/22 1900 06/29/22 2000   BP: 124/81 123/75 121/76 143/81   Pulse: 90 82 82 90   Patient Position - Orthostatic VS: Sitting   Sitting         Visual Acuity      ED Medications  Medications   sodium chloride 0 9 % bolus 1,000 mL (0 mL Intravenous Stopped 6/29/22 2100)       Diagnostic Studies  Results Reviewed     Procedure Component Value Units Date/Time    Urine culture [[id number]] Collected: 06/29/22 1904    Lab Status: Final result Specimen: Urine, Clean Catch Updated: 07/01/22 2009     Urine Culture 20,000-29,000 cfu/ml     HS Troponin I 2hr [[id number]] Collected: 06/29/22 1913    Lab Status: Final result Specimen: Blood from Arm, Right Updated: 06/29/22 1949     hs TnI 2hr <2 ng/L      Delta 2hr hsTnI --    D-Dimer [[id number]]  (Normal) Collected: 06/29/22 1652    Lab Status: Final result Specimen: Blood from Arm, Right Updated: 06/29/22 1916     D-Dimer, Quant 0 35 ug/ml FEU     Urine Macroscopic, POC [[id number]] Collected: 06/29/22 1904    Lab Status: Final result Specimen: Urine Updated: 06/29/22 1906     Color, UA Yellow     Clarity, UA Clear     pH, UA 6 0     Leukocytes, UA Negative     Nitrite, UA Negative     Protein, UA Negative mg/dl      Glucose, UA Negative mg/dl      Ketones, UA Negative mg/dl      Urobilinogen, UA 0 2 E U /dl      Bilirubin, UA Negative     Occult Blood, UA Negative     Specific Gravity, UA 1 020    Narrative:      CLINITEK RESULT    HS Troponin 0hr (reflex protocol) [[id number]]  (Normal) Collected: 06/29/22 1652    Lab Status: Final result Specimen: Blood from Arm, Right Updated: 06/29/22 1723     hs TnI 0hr <2 ng/L     Comprehensive metabolic panel [[id number]] Collected: 06/29/22 1652    Lab Status: Final result Specimen: Blood from Arm, Right Updated: 06/29/22 1716     Sodium 135 mmol/L      Potassium 3 8 mmol/L      Chloride 107 mmol/L      CO2 22 mmol/L      ANION GAP 6 mmol/L      BUN 11 mg/dL      Creatinine 0 67 mg/dL      Glucose 82 mg/dL      Calcium 9 2 mg/dL      AST 17 U/L      ALT 27 U/L      Alkaline Phosphatase 45 U/L      Total Protein 6 8 g/dL      Albumin 4 0 g/dL      Total Bilirubin 0 22 mg/dL      eGFR 118 ml/min/1 73sq m     Narrative:      National Kidney Disease Foundation guidelines for Chronic Kidney Disease (CKD):     Stage 1 with normal or high GFR (GFR > 90 mL/min/1 73 square meters)    Stage 2 Mild CKD (GFR = 60-89 mL/min/1 73 square meters)    Stage 3A Moderate CKD (GFR = 45-59 mL/min/1 73 square meters)    Stage 3B Moderate CKD (GFR = 30-44 mL/min/1 73 square meters)    Stage 4 Severe CKD (GFR = 15-29 mL/min/1 73 square meters)    Stage 5 End Stage CKD (GFR <15 mL/min/1 73 square meters)  Note: GFR calculation is accurate only with a steady state creatinine    CBC and differential [[id number]]  (Abnormal) Collected: 06/29/22 1652    Lab Status: Final result Specimen: Blood from Arm, Right Updated: 06/29/22 1701     WBC 8 76 Thousand/uL      RBC 3 79 Million/uL      Hemoglobin 12 2 g/dL      Hematocrit 35 9 %      MCV 95 fL      MCH 32 2 pg      MCHC 34 0 g/dL      RDW 13 0 %      MPV 9 8 fL      Platelets 542 Thousands/uL      nRBC 0 /100 WBCs      Neutrophils Relative 73 %      Immat GRANS % 1 %      Lymphocytes Relative 19 %      Monocytes Relative 6 %      Eosinophils Relative 1 %      Basophils Relative 0 %      Neutrophils Absolute 6 36 Thousands/µL      Immature Grans Absolute 0 05 Thousand/uL      Lymphocytes Absolute 1 69 Thousands/µL      Monocytes Absolute 0 55 Thousand/µL      Eosinophils Absolute 0 08 Thousand/µL      Basophils Absolute 0 03 Thousands/µL                  No orders to display              Procedures  ECG 12 Lead Documentation Only    Date/Time: 6/29/2022 8:53 AM  Performed by: Liliam Pena MD  Authorized by: Liliam Pena MD     Indications / Diagnosis:  Chest Pain  ECG reviewed by me, the ED Provider: yes    Patient location:  ED  Previous ECG:     Previous ECG:  Unavailable    Comparison to cardiac monitor: Yes    Interpretation:     Interpretation: normal    Rate:     ECG rate:  90 BPM    ECG rate assessment: normal    Rhythm:     Rhythm: sinus rhythm    Ectopy:     Ectopy: none    QRS:     QRS axis:  Normal  Conduction:     Conduction: normal    ST segments:     ST segments:  Normal  T waves:     T waves: normal    Comments:      No acute ischemia, no STEMI             ED Course  ED Course as of 07/02/22 0845   Wed Jun 29, 2022   1825 hs TnI 0hr: <2  wnl   1825 CBC and differential(!)  Reassuring, no end organ damage, no AG, normal bicarb  1825 Comprehensive metabolic panel  Reassuring, no end organ damage, no AG, normal bicarb  1825 ECG 12 lead  Normal sinus rhythm  Normal ECG  No previous ECGs available  Confirmed by Sreekanth Watts ([id number]) on 6/29/2022 4:52:49 PM   1847 hs TnI 0hr: <2  wnl   1847 CBC and differential(!)   cbc   1847 Comprehensive metabolic panel  Reassuring, no end organ damage, no AG, normal bicarb  1910 Urine Macroscopic, POC  wnl   1917 D-Dimer, Quant: 0 35  wnl   1918 Discussed with OB, request fetal heart tones and close f/u with them  1951 HS Troponin I 2hr  wnl                               SBIRT 20yo+    Flowsheet Row Most Recent Value   SBIRT (25 yo +)    In order to provide better care to our patients, we are screening all of our patients for alcohol and drug use  Would it be okay to ask you these screening questions?  Unable to answer at this time Filed at: 06/29/2022 1842                    MDM  Number of Diagnoses or Management Options  12 weeks gestation of pregnancy  Chest pain  Diagnosis management comments: Pt with non exertional chest pain in setting of intense anxiety over state of pregnancies, no clinical signs of VTE or vol overload, no pulse differential   EKG non-ischemic, lab work up reassuring  NO  symptoms, no cramping, no VB or discharge  Improved symptoms without ED intervention  Pt very anxious, became upset when fetal heart tones documented as 128 because this has been lower than previously documented HR  We had a lengthy discussion about how this was a normal rate at this point in pregnancy, pt was persistently anxious  Case was discussed with OB/Gyn  The resident came down and evaluated pt and also advised patient of the normal rate  Pt has scheduled ultrasound and f/u tomorrow  At this point low clinical suspicion for cardiac etiology, VTE, aortic disaster  RTER precautions discussed and documented on discharge paperwork, pt and family endorsed good understanding of reasons to return              Amount and/or Complexity of Data Reviewed  Clinical lab tests: ordered and reviewed  Tests in the radiology section of CPT®: ordered and reviewed  Tests in the medicine section of CPT®: ordered and reviewed  Review and summarize past medical records: yes  Discuss the patient with other providers: yes  Independent visualization of images, tracings, or specimens: yes    Risk of Complications, Morbidity, and/or Mortality  Presenting problems: moderate  Diagnostic procedures: moderate  Management options: moderate    Patient Progress  Patient progress: stable      Disposition  Final diagnoses:   12 weeks gestation of pregnancy   Chest pain     Time reflects when diagnosis was documented in both MDM as applicable and the Disposition within this note     Time User Action Codes Description Comment    6/29/2022  6:48 PM Yudith Alva Add [Z3A 12] 12 weeks gestation of pregnancy     6/29/2022  8:13 PM Yudith Alva [R07 9] Chest pain       ED Disposition     ED Disposition   Discharge    Condition   Stable    Date/Time   Wed Jun 29, 2022 2013    Comment   Juliette CAMPOS Robe discharge to home/self care  Follow-up Information     Follow up With Specialties Details Why Contact Info Additional Information    Vilma Steve MD [facility] Medicine Schedule an appointment as soon as possible for a visit   [address]  [phone]       [facility]etric[facility]necology Schedule an appointment as soon as possible for a visit   [address]  [phone]       [facility] Emergency Medicine  As needed, If symptoms worsen [address] [facility] [id number] [place] [address] Emergency Department, [address], [place], [place], [address]          Discharge Medication List as of 6/29/2022  8:15 PM      CONTINUE these medications which have NOT CHANGED    Details   folic acid (FOLVITE) 239 mcg tablet Take 400 mcg by mouth in the morning , Historical Med      Prenatal Vit-Fe Fumarate-FA (PRENATAL 19 PO) Take by mouth, Historical Med      saccharomyces boulardii (FLORASTOR) 250 mg capsule Take 250 mg by mouth 2 (two) times a day, Historical Med      sertraline (ZOLOFT) 25 mg tablet Take 25 mg by mouth in the morning , Historical Med             No discharge procedures on file      PDMP Review       Value Time User    PDMP Reviewed  Yes 7/23/2020 10:12 AM Vilma Steve MD          ED Provider  Electronically Signed by           Jhoan Seymour MD  07/02/22 6339

## 2022-07-03 NOTE — PROGRESS NOTES
Maxwell Álvarezecca: Ms Joy Rodrigues was seen today for anatomic survey and cervical length screening ultrasound  See ultrasound report under "OB Procedures" tab  The time spent on this established patient on the encounter date included 10 minutes previsit service time reviewing records and precharting, 24 minutes face-to-face service time counseling regarding results and coordinating care, and  12 minutes charting, totalling 46 minutes  Please don't hesitate to contact our office with any concerns or questions    Laurie Thacker MD

## 2022-07-19 ENCOUNTER — ROUTINE PRENATAL (OUTPATIENT)
Dept: PERINATAL CARE | Facility: OTHER | Age: 32
End: 2022-07-19
Payer: COMMERCIAL

## 2022-07-19 VITALS
DIASTOLIC BLOOD PRESSURE: 86 MMHG | SYSTOLIC BLOOD PRESSURE: 136 MMHG | HEART RATE: 110 BPM | HEIGHT: 69 IN | BODY MASS INDEX: 28.97 KG/M2 | WEIGHT: 195.6 LBS

## 2022-07-19 DIAGNOSIS — O28.0 ABNORMAL MSAFP (MATERNAL SERUM ALPHA-FETOPROTEIN), ELEVATED: Primary | ICD-10-CM

## 2022-07-19 DIAGNOSIS — Z3A.20 20 WEEKS GESTATION OF PREGNANCY: ICD-10-CM

## 2022-07-19 DIAGNOSIS — O36.62X0 MACROSOMIA OF FETUS AFFECTING MANAGEMENT OF MOTHER IN SECOND TRIMESTER, SINGLE OR UNSPECIFIED FETUS: ICD-10-CM

## 2022-07-19 DIAGNOSIS — O31.10X0 VANISHING TWIN SYNDROME: ICD-10-CM

## 2022-07-19 PROCEDURE — 76816 OB US FOLLOW-UP PER FETUS: CPT | Performed by: OBSTETRICS & GYNECOLOGY

## 2022-07-19 PROCEDURE — 99213 OFFICE O/P EST LOW 20 MIN: CPT | Performed by: OBSTETRICS & GYNECOLOGY

## 2022-07-19 NOTE — PROGRESS NOTES
Juliette Nagel  has no complaints today at 20w5d  She reports fetal movements and does not report any vaginal bleeding or signs of labor  Her recently completed fetal testing revealed a negative NIPT  MSAFP was mildly abnormal at 2 52 MOM  She is here today for completion of her anatomical survey  Problem list:  1  This pregnancy was complicated by a twin demise of baby B early in pregnancy at approximately 9 weeks  This was a dichorionic diamniotic twin pregnancy  2   Elevated MSAFP 2 52 MOM  Last ultrasound was unable to complete the review of the spinal anatomy secondary to fetal position  3   Family history of large babies over 9 pounds was reported by Becca and her partner Sherrie Ceron  4   Family history of hypertension and diabetes in her father  11  Her anatomy scan 2 weeks ago showed a fetus that was measuring 9 days ahead of dates  Ultrasound findings: The ultrasound today reviewed the prior anatomy that was limited on her 18 week scan  No malformations are detected  The placenta appears normal   There is no evidence for a previa  Views of the left hand in the area of the pinky were still limited secondary few fetal position  The 2nd sac  from her demise was not visualized today  Pregnancy ultrasound has limitations and is unable to detect all forms of fetal congenital abnormalities  Counseling:  Elevated MSAFP in the face of a normal fetal spine and abdominal cord insertion may be secondary to a possible placental abnormality that is letting too much MSAFP be released into the maternal blood stream   This potentially can be explained by her prior loss of baby B but this could also be from a possible abnormality in the placenta of baby A   Elevated MSAFP level can be associated with an increased risk for fetal growth restriction or development of preeclampsia later in the pregnancy  Most pregnancies though will be completely normal      Follow up recommended:   1   With her family history of diabetes and family history of macrosomia and her baby was growing ahead of dates 2 weeks ago, recommend she complete an early diabetes screen with a fasting blood sugar and a hemoglobin A1c which I ordered  2  Recommend a follow-up ultrasound for growth and missed anatomy at 28 weeks  Pre visit time reviewing her records   5 minutes  Face to face time 10 minutes  Post visit time on documentation of note, updating her problem list, adding orders and prescriptions 10 minutes  Procedures that were completed today were charged separately  The level of decision making was low complexity      Jone Elliott MD

## 2022-07-19 NOTE — LETTER
July 19, 2022     Mala Gutierrez PA-C  [address] [facility] [facility] [address]    Patient: Wing Nagel   YOB: 1990   Date of Visit: 7/19/2022       Dear Dr Ricky Bradford: Thank you for referring Fanny Minaya to me for evaluation  Below are my notes for this consultation  If you have questions, please do not hesitate to call me  I look forward to following your patient along with you  Sincerely,        Bowen Stephen MD        CC: No Recipients  Bowen Stephen MD  7/19/2022  5:22 PM  Sign when Signing Visit  Consuelo Escoto  has no complaints today at 20w5d  She reports fetal movements and does not report any vaginal bleeding or signs of labor  Her recently completed fetal testing revealed a negative NIPT  MSAFP was mildly abnormal at 2 52 MOM  She is here today for completion of her anatomical survey  Problem list:  1  This pregnancy was complicated by a twin demise of baby B early in pregnancy at approximately 9 weeks  This was a dichorionic diamniotic twin pregnancy  2   Elevated MSAFP 2 52 MOM  Last ultrasound was unable to complete the review of the spinal anatomy secondary to fetal position  3   Family history of large babies over 9 pounds was reported by [place] and her partner Eunice Jiménez  4   Family history of hypertension and diabetes in her father  11  Her anatomy scan 2 weeks ago showed a fetus that was measuring 9 days ahead of dates  Ultrasound findings: The ultrasound today reviewed the prior anatomy that was limited on her 18 week scan  No malformations are detected  The placenta appears normal   There is no evidence for a previa  Views of the left hand in the area of the pinky were still limited secondary few fetal position  The 2nd sac  from her demise was not visualized today  Pregnancy ultrasound has limitations and is unable to detect all forms of fetal congenital abnormalities        Counseling:  Elevated MSAFP in the face of a normal fetal spine and abdominal cord insertion may be secondary to a possible placental abnormality that is letting too much MSAFP be released into the maternal blood stream   This potentially can be explained by her prior loss of baby B but this could also be from a possible abnormality in the placenta of baby A   Elevated MSAFP level can be associated with an increased risk for fetal growth restriction or development of preeclampsia later in the pregnancy  Most pregnancies though will be completely normal      Follow up recommended:   1  With her family history of diabetes and family history of macrosomia and her baby was growing ahead of dates 2 weeks ago, recommend she complete an early diabetes screen with a fasting blood sugar and a hemoglobin A1c which I ordered  2  Recommend a follow-up ultrasound for growth and missed anatomy at 28 weeks  Pre visit time reviewing her records   5 minutes  Face to face time 10 minutes  Post visit time on documentation of note, updating her problem list, adding orders and prescriptions 10 minutes  Procedures that were completed today were charged separately  The level of decision making was low complexity      Jacquelin Barfield MD

## 2022-07-20 ENCOUNTER — APPOINTMENT (OUTPATIENT)
Dept: LAB | Facility: MEDICAL CENTER | Age: 32
End: 2022-07-20
Payer: COMMERCIAL

## 2022-07-20 LAB
EST. AVERAGE GLUCOSE BLD GHB EST-MCNC: 97 MG/DL
GLUCOSE P FAST SERPL-MCNC: 89 MG/DL (ref 65–99)
HBA1C MFR BLD: 5 %

## 2022-07-20 PROCEDURE — 36415 COLL VENOUS BLD VENIPUNCTURE: CPT | Performed by: OBSTETRICS & GYNECOLOGY

## 2022-07-20 PROCEDURE — 82947 ASSAY GLUCOSE BLOOD QUANT: CPT | Performed by: OBSTETRICS & GYNECOLOGY

## 2022-07-20 PROCEDURE — 83036 HEMOGLOBIN GLYCOSYLATED A1C: CPT | Performed by: OBSTETRICS & GYNECOLOGY

## 2022-07-20 NOTE — RESULT ENCOUNTER NOTE
Juliette,    The results of your fasting blood sugar and hemoglobin A1c were normal indicating a low risk for gestational diabetes currently  You will need to be re-screening though at 28 weeks which is the normal time for screening in pregnancy  Your OB office will order the 28 week screening      Yazmin Beckwith MD

## 2022-07-29 ENCOUNTER — ROUTINE PRENATAL (OUTPATIENT)
Dept: OBGYN CLINIC | Facility: CLINIC | Age: 32
End: 2022-07-29

## 2022-07-29 VITALS — BODY MASS INDEX: 29.24 KG/M2 | DIASTOLIC BLOOD PRESSURE: 80 MMHG | WEIGHT: 198 LBS | SYSTOLIC BLOOD PRESSURE: 116 MMHG

## 2022-07-29 DIAGNOSIS — O28.0 ABNORMAL MSAFP (MATERNAL SERUM ALPHA-FETOPROTEIN), ELEVATED: ICD-10-CM

## 2022-07-29 DIAGNOSIS — Z3A.22 22 WEEKS GESTATION OF PREGNANCY: ICD-10-CM

## 2022-07-29 DIAGNOSIS — Z34.92 PRENATAL CARE IN SECOND TRIMESTER: Primary | ICD-10-CM

## 2022-07-29 PROCEDURE — PNV: Performed by: STUDENT IN AN ORGANIZED HEALTH CARE EDUCATION/TRAINING PROGRAM

## 2022-07-29 NOTE — PROGRESS NOTES
Gopi Campos is a 28 yo  010 at 22 weeks and 1 day presented for routine prenatal care- has occasional leg cramping, none today  Denies any contractions, loss of fluid or vaginal bleeding  Endorses some fetal movement  Urine neg/neg  Has follow-up ultrasound for growth and anatomy at 28 weeks  Endorses some nausea like cramps- reviewed  Return to office in 4 weeks or sooner if needed

## 2022-07-29 NOTE — PATIENT INSTRUCTIONS
Pregnancy at 23 to 22 Weeks   AMBULATORY CARE:   What changes are happening with your body:  Now that you are in your second trimester, you have more energy  You may also be feeling hungrier than usual  You may be gaining about ½ to 1 pound a week, and your pregnancy is beginning to show  You may need to start wearing maternity clothes  As your baby gets larger, you may have other symptoms  These may include body aches or stretch marks on your abdomen, breasts, thighs, or buttocks  Seek care immediately if:   You develop a severe headache that does not go away  You have new or increased vision changes, such as blurred or spotted vision  You have new or increased swelling in your face or hands  You have vaginal spotting or bleeding  Your water broke or you feel warm water gushing or trickling from your vagina  Call your doctor or obstetrician if:   You have abdominal cramps, pressure, or tightening  You have a change in vaginal discharge  You cannot keep food or drinks down, and you are losing weight  You have chills or a fever  You have vaginal itching, burning, or pain  You have yellow, green, white, or foul-smelling vaginal discharge  You have pain or burning when you urinate, less urine than usual, or pink or bloody urine  You have questions or concerns about your condition or care  How to care for yourself at this stage of your pregnancy:       Eat a variety of healthy foods  Healthy foods include fruits, vegetables, whole-grain breads, low-fat dairy foods, beans, lean meats, and fish  Drink liquids as directed  Ask how much liquid to drink each day and which liquids are best for you  Limit caffeine to less than 200 milligrams each day  Limit your intake of fish to 2 servings each week  Choose fish low in mercury such as canned light tuna, shrimp, salmon, cod, or tilapia  Do not  eat fish high in mercury such as swordfish, tilefish, ayla mackerel, and shark           Take prenatal vitamins as directed  Your need for certain vitamins and minerals, such as folic acid, increases during pregnancy  Prenatal vitamins provide some of the extra vitamins and minerals you need  Prenatal vitamins may also help to decrease the risk of certain birth defects  Talk to your healthcare provider about exercise  Moderate exercise can help you stay fit  Your healthcare provider will help you plan an exercise program that is safe for you during pregnancy  Do not smoke  Smoking increases your risk of a miscarriage and other health problems during your pregnancy  Smoking can cause your baby to be born too early or weigh less at birth  Ask your healthcare provider for information if you need help quitting  Do not drink alcohol  Alcohol passes from your body to your baby through the placenta  It can affect your baby's brain development and cause fetal alcohol syndrome (FAS)  FAS is a group of conditions that causes mental, behavior, and growth problems  Talk to your healthcare provider before you take any medicines  Many medicines may harm your baby if you take them when you are pregnant  Do not take any medicines, vitamins, herbs, or supplements without first talking to your healthcare provider  Never use illegal or street drugs (such as marijuana or cocaine) while you are pregnant  Safety tips during pregnancy:   Avoid hot tubs and saunas  Do not use a hot tub or sauna while you are pregnant, especially during your first trimester  Hot tubs and saunas may raise your baby's temperature and increase the risk of birth defects  Avoid toxoplasmosis  This is an infection caused by eating raw meat or being around infected cat feces  It can cause birth defects, miscarriages, and other problems  Wash your hands after you touch raw meat  Make sure any meat is well-cooked before you eat it  Avoid raw eggs and unpasteurized milk   Use gloves or ask someone else to clean your cat's litter box while you are pregnant  Changes happening with your baby:  By 22 weeks, your baby is about 8 inches long from the top of the head to the rump (baby's bottom)  Your baby also weighs about 1 pound  Your baby is becoming much more active  You may be able to feel the baby move inside you now  The first movements may not be that noticeable  They may feel like a fluttering sensation  As time goes on, your baby's movements will become stronger and more noticeable  What you need to know about prenatal care:  During the first 28 weeks of your pregnancy, you will see your healthcare provider once a month  Your healthcare provider will check your blood pressure and weight  You may also need the following:  A urine test  may also be done to check for sugar and protein  These can be signs of gestational diabetes or infection  Protein in your urine may also be a sign of preeclampsia  Preeclampsia is a condition that can develop during week 20 or later of your pregnancy  It causes high blood pressure, and it can cause problems with your kidneys and other organs  Fundal height  is a measurement of your uterus to check your baby's growth  This number is usually the same as the number of weeks that you have been pregnant  A fetal ultrasound  shows pictures of your baby inside your uterus  It shows your baby's development  The movement and position of your baby can also be seen  Your healthcare provider may be able to tell you what your baby's gender is during the ultrasound  Your baby's heart rate  will be checked  Follow up with your obstetrician as directed:  Write down your questions so you remember to ask them during your visits  © [employer] 2022 Information is for End User's use only and may not be sold, redistributed or otherwise used for commercial purposes   All illustrations and images included in CareNotes® are the copyrighted property of [employer]  or [employer]  The above information is an  only  It is not intended as medical advice for individual conditions or treatments  Talk to your doctor, nurse or pharmacist before following any medical regimen to see if it is safe and effective for you

## 2022-08-16 ENCOUNTER — TELEPHONE (OUTPATIENT)
Dept: OBGYN CLINIC | Facility: CLINIC | Age: 32
End: 2022-08-16

## 2022-08-16 ENCOUNTER — HOSPITAL ENCOUNTER (OUTPATIENT)
Facility: HOSPITAL | Age: 32
Discharge: HOME | End: 2022-08-16
Attending: OBSTETRICS & GYNECOLOGY | Admitting: OBSTETRICS & GYNECOLOGY
Payer: COMMERCIAL

## 2022-08-16 VITALS
SYSTOLIC BLOOD PRESSURE: 116 MMHG | RESPIRATION RATE: 17 BRPM | DIASTOLIC BLOOD PRESSURE: 70 MMHG | BODY MASS INDEX: 29.24 KG/M2 | HEIGHT: 69 IN | HEART RATE: 81 BPM | TEMPERATURE: 98.2 F

## 2022-08-16 PROCEDURE — NC001 PR NO CHARGE: Performed by: OBSTETRICS & GYNECOLOGY

## 2022-08-16 PROCEDURE — 76817 TRANSVAGINAL US OBSTETRIC: CPT

## 2022-08-16 PROCEDURE — 99213 OFFICE O/P EST LOW 20 MIN: CPT

## 2022-08-16 NOTE — TELEPHONE ENCOUNTER
Pt calling was hanging drywall and doing housework and started having severe abdominal cramping  she rested/showered and cramping is not as severe but still painful and present and also has hip pain as well  cramping is sporadic, she has not noticed anything regular but has been ongoing for over an hour now  pt states her placenta is in the front but has been noticing baby's movements today  denies any spotting/bleeding  Pt aware will review with on call provider, TT sent at this time

## 2022-08-16 NOTE — PROCEDURES
Juliette Nagel, adeel  at 24w5d with an MIGUEL of 2022, by Last Menstrual Period, was seen at [address] for the following procedure(s): $Procedure Type: US - Transvaginal]                   Ultrasound Other  Cervical Length: 3 42  Funnel: No  Debris: No             Ultrasound Probe Disinfection    A transvaginal ultrasound was performed     Prior to use, disinfection was performed with High Level Disinfection Process (Trophon)       Tierra Frazier MD  22  2:44 PM

## 2022-08-16 NOTE — PROGRESS NOTES
L&D Triage Note - OB/GYN  Parviz Nagel 29 y o  female MRN: [id number]  Unit/Bed#:  TRIAGE 2 Encounter: [id number]      ASSESSMENT:    Blaine Bhadrwaj is a 29 y o  [place] at 24w5d who was worked up today for  labor following episode of abdominal cramping that occurred after hanging drywall earlier today  Due to her reassuring workup described below, it is felt to be safe to discharge her home at this time  She was also encouraged to not engage in strenuous activity for the remainder of her pregnancy  PLAN:    1) Speculum Exam  2)SVE  3) Transvaginal Ultrasound- Cervical length   4) Continue routine prenatal care  5) Discharge from [facility] triage with  labor precautions    - Reviewed rupture of membranes, false vs true labor, decreased fetal movement, and vaginal bleeding   - Pt to call provider with any concerns and follow up at her next scheduled prenatal appointment    - Case discussed with Dr Caron Pizano:    Parviz Nagel 29 y o  [place] at 24w5d with an Estimated Date of Delivery: 22 who presented today to triage after some lower abdominal cramping that occurred after hanging drywall  She described the pain as slightly higher than her usual round ligament pain  While she was sitting in triage she says that the sensation did improve  Her current obstetrical history is significant for one prior SAB in 2021  This pregnancy has been complicated only by GBS bacteruria  Contractions: endoreses- none seen on toco  Leakage of fluid: denies  Vaginal Bleeding: denies  Fetal movement: present     OBJECTIVE:    There were no vitals filed for this visit  ROS:  Constitutional: Negative  Respiratory: Negative  Cardiovascular: Negative    Gastrointestinal: Negative    General Physical Exam:  General: in no apparent distress and alert  Cardiovascular: Cor RRR  Lungs: non-labored breathing  Abdomen: mild tenderness in the lower abdomen    Lower extremeties: nontender    Cervical Exam  Speculum: Cervical os is visibly closed   Scant white discharge noted  SVE: Closed/thick/high    Fetal monitoring:  FHT:  145 bpm/ Moderate 6 - 25 bpm / 10 x 10 accelerations present, no decelerations  [place]: contractions none noted     KOH/WTMT:     Infection:   - no clue cells    - no hyphae   - no trichomonads present    Membrane status   - negative ferning   - negative nitrazene   - negative pooling       Imaging:       TVUS   - Cervical length    - 3 49cm    - 3 40cm    - 3 38cm   - Presentation: undetermined      Tierra Frazier MD,  OBGYN PGY-1  8/16/2022 12:52 PM

## 2022-08-26 ENCOUNTER — ROUTINE PRENATAL (OUTPATIENT)
Dept: OBGYN CLINIC | Facility: CLINIC | Age: 32
End: 2022-08-26

## 2022-08-26 VITALS — BODY MASS INDEX: 30.01 KG/M2 | SYSTOLIC BLOOD PRESSURE: 124 MMHG | WEIGHT: 203.2 LBS | DIASTOLIC BLOOD PRESSURE: 88 MMHG

## 2022-08-26 DIAGNOSIS — Z34.93 PRENATAL CARE IN THIRD TRIMESTER: Primary | ICD-10-CM

## 2022-08-26 DIAGNOSIS — Z3A.26 26 WEEKS GESTATION OF PREGNANCY: ICD-10-CM

## 2022-08-26 PROCEDURE — PNV: Performed by: STUDENT IN AN ORGANIZED HEALTH CARE EDUCATION/TRAINING PROGRAM

## 2022-08-26 NOTE — LETTER
August 26, 2022     Patient: Juanita Rizzo   YOB: 1990   Date of Visit: 8/26/2022       To Whom It May Concern: It is my medical opinion that Arsh Terry may return to work on 09/29/2022 may return to work on 08/29/2022, with restriction of no heavy lifting , pulling or pushing of more than 30 lbs  Please allow patient frequent rest break for 10-15 mins every 1 hour as needed   If you have any questions or concerns, please don't hesitate to call           Sincerely,        Lali Parker MD    CC: No Recipients

## 2022-08-26 NOTE — PATIENT INSTRUCTIONS
Pregnancy at 23 to 26 Weeks   AMBULATORY CARE:   What changes are happening to your body: You are now close to or at the beginning of the third trimester  The third trimester starts at 24 weeks and ends with delivery  As your baby gets larger, you may develop certain symptoms  These may include pain in your back or down the sides of your abdomen  You may also have stretch marks on your abdomen, breasts, thighs, or buttocks  You may also have constipation  Seek care immediately if:   You develop a severe headache that does not go away  You have new or increased vision changes, such as blurred or spotted vision  You have new or increased swelling in your face or hands  You have vaginal spotting or bleeding  Your water broke or you feel warm water gushing or trickling from your vagina  Call your doctor or obstetrician if:   You have abdominal cramps, pressure, or tightening  You have a change in vaginal discharge  You have light bleeding  You have chills or a fever  You have vaginal itching, burning, or pain  You have yellow, green, white, or foul-smelling vaginal discharge  You have pain or burning when you urinate, less urine than usual, or pink or bloody urine  You have questions or concerns about your condition or care  How to care for yourself at this stage of your pregnancy:       Eat a variety of healthy foods  Healthy foods include fruits, vegetables, whole-grain breads, low-fat dairy foods, beans, lean meats, and fish  Drink liquids as directed  Ask how much liquid to drink each day and which liquids are best for you  Limit caffeine to less than 200 milligrams each day  Limit your intake of fish to 2 servings each week  Choose fish low in mercury such as canned light tuna, shrimp, salmon, cod, or tilapia  Do not  eat fish high in mercury such as swordfish, tilefish, ayla mackerel, and shark  Manage back pain    Do not stand for long periods of time or lift heavy items  Use good posture while you stand, squat, or bend  Wear low-heeled shoes with good support  Rest may also help to relieve back pain  Ask your healthcare provider about exercises you can do to strengthen your back muscles  Take prenatal vitamins as directed  Your need for certain vitamins and minerals, such as folic acid, increases during pregnancy  Prenatal vitamins provide some of the extra vitamins and minerals you need  Prenatal vitamins may also help to decrease the risk of certain birth defects  Talk to your healthcare provider about exercise  Moderate exercise can help you stay fit  Your healthcare provider will help you plan an exercise program that is safe for you during pregnancy  Do not smoke  Smoking increases your risk of a miscarriage and other health problems during your pregnancy  Smoking can cause your baby to be born too early or weigh less at birth  Ask your healthcare provider for information if you need help quitting  Do not drink alcohol  Alcohol passes from your body to your baby through the placenta  It can affect your baby's brain development and cause fetal alcohol syndrome (FAS)  FAS is a group of conditions that causes mental, behavior, and growth problems  Talk to your healthcare provider before you take any medicines  Many medicines may harm your baby if you take them when you are pregnant  Do not take any medicines, vitamins, herbs, or supplements without first talking to your healthcare provider  Never use illegal or street drugs (such as marijuana or cocaine) while you are pregnant  Safety tips during pregnancy:   Avoid hot tubs and saunas  Do not use a hot tub or sauna while you are pregnant, especially during your first trimester  Hot tubs and saunas may raise your baby's temperature and increase the risk of birth defects  Avoid toxoplasmosis  This is an infection caused by eating raw meat or being around infected cat feces   It can cause birth defects, miscarriages, and other problems  Wash your hands after you touch raw meat  Make sure any meat is well-cooked before you eat it  Avoid raw eggs and unpasteurized milk  Use gloves or ask someone else to clean your cat's litter box while you are pregnant  Changes that are happening with your baby:  By 26 weeks, your baby will weigh about 2 pounds  Your baby will be about 10 inches long from the top of the head to the rump (baby's bottom)  Your baby's movements are much stronger now  Your baby's eyes are almost completely formed and can partially open  Your baby also sleeps and wakes up  What you need to know about prenatal care: Your healthcare provider will check your blood pressure and weight  You may also need the following:  A urine test  may also be done to check for sugar and protein  These can be signs of gestational diabetes or infection  Protein in your urine may also be a sign of preeclampsia  Preeclampsia is a condition that can develop during week 20 or later of your pregnancy  It causes high blood pressure, and it can cause problems with your kidneys and other organs  A gestational diabetes screen  may be done  Your healthcare provider may order either a 1-step or 2-step oral glucose tolerance test (OGTT)  1-step OGTT:  Your blood sugar level will be tested after you have not eaten for 8 hours (fasting)  You will then be given a glucose drink  Your level will be tested again 1 hour and 2 hours after you finish the drink  2-step OGTT:  You do not have to fast for the first part of the test  You will have the glucose drink at any time of day  Your blood sugar level will be checked 1 hour later  If your blood sugar is higher than a certain level, another test will be ordered  You will fast and your blood sugar level will be tested  You will have the glucose drink  Your blood will be tested again 1 hour, 2 hours, and 3 hours after you finish the glucose drink      Fundal height is a measurement of your uterus to check your baby's growth  This number is usually the same as the number of weeks that you have been pregnant  Your baby's heart rate  will be checked  Follow up with your doctor or obstetrician as directed:  Write down your questions so you remember to ask them during your visits  © Copyright [employer] 2022 Information is for End User's use only and may not be sold, redistributed or otherwise used for commercial purposes  All illustrations and images included in CareNotes® are the copyrighted property of [employer]  or [facility]   The above information is an  only  It is not intended as medical advice for individual conditions or treatments  Talk to your doctor, nurse or pharmacist before following any medical regimen to see if it is safe and effective for you

## 2022-08-26 NOTE — PROGRESS NOTES
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

## 2022-09-03 ENCOUNTER — APPOINTMENT (OUTPATIENT)
Dept: LAB | Facility: MEDICAL CENTER | Age: 32
End: 2022-09-03
Payer: COMMERCIAL

## 2022-09-03 DIAGNOSIS — Z34.93 PRENATAL CARE IN THIRD TRIMESTER: ICD-10-CM

## 2022-09-03 LAB
ERYTHROCYTE [DISTWIDTH] IN BLOOD BY AUTOMATED COUNT: 12.5 % (ref 11.6–15.1)
EST. AVERAGE GLUCOSE BLD GHB EST-MCNC: 103 MG/DL
GLUCOSE P FAST SERPL-MCNC: 81 MG/DL (ref 65–99)
HBA1C MFR BLD: 5.2 %
HCT VFR BLD AUTO: 33.4 % (ref 34.8–46.1)
HGB BLD-MCNC: 10.8 G/DL (ref 11.5–15.4)
MCH RBC QN AUTO: 31.3 PG (ref 26.8–34.3)
MCHC RBC AUTO-ENTMCNC: 32.3 G/DL (ref 31.4–37.4)
MCV RBC AUTO: 97 FL (ref 82–98)
PLATELET # BLD AUTO: 206 THOUSANDS/UL (ref 149–390)
PMV BLD AUTO: 11.7 FL (ref 8.9–12.7)
RBC # BLD AUTO: 3.45 MILLION/UL (ref 3.81–5.12)
RPR SER QL: NORMAL
WBC # BLD AUTO: 7.36 THOUSAND/UL (ref 4.31–10.16)

## 2022-09-03 PROCEDURE — 36415 COLL VENOUS BLD VENIPUNCTURE: CPT

## 2022-09-03 PROCEDURE — 83036 HEMOGLOBIN GLYCOSYLATED A1C: CPT

## 2022-09-03 PROCEDURE — 85027 COMPLETE CBC AUTOMATED: CPT

## 2022-09-03 PROCEDURE — 86592 SYPHILIS TEST NON-TREP QUAL: CPT

## 2022-09-03 PROCEDURE — 82947 ASSAY GLUCOSE BLOOD QUANT: CPT

## 2022-09-06 ENCOUNTER — TELEPHONE (OUTPATIENT)
Dept: OBGYN CLINIC | Facility: CLINIC | Age: 32
End: 2022-09-06

## 2022-09-07 ENCOUNTER — HOSPITAL ENCOUNTER (OUTPATIENT)
Facility: HOSPITAL | Age: 32
Discharge: HOME | End: 2022-09-07
Attending: OBSTETRICS & GYNECOLOGY | Admitting: OBSTETRICS & GYNECOLOGY
Payer: COMMERCIAL

## 2022-09-07 ENCOUNTER — TELEPHONE (OUTPATIENT)
Dept: OBGYN CLINIC | Facility: CLINIC | Age: 32
End: 2022-09-07

## 2022-09-07 VITALS
WEIGHT: 203 LBS | RESPIRATION RATE: 18 BRPM | DIASTOLIC BLOOD PRESSURE: 75 MMHG | HEIGHT: 69 IN | SYSTOLIC BLOOD PRESSURE: 119 MMHG | TEMPERATURE: 97.5 F | BODY MASS INDEX: 30.07 KG/M2

## 2022-09-07 PROCEDURE — NC001 PR NO CHARGE: Performed by: OBSTETRICS & GYNECOLOGY

## 2022-09-07 PROCEDURE — 76815 OB US LIMITED FETUS(S): CPT | Performed by: OBSTETRICS & GYNECOLOGY

## 2022-09-07 PROCEDURE — 59025 FETAL NON-STRESS TEST: CPT

## 2022-09-07 PROCEDURE — 99213 OFFICE O/P EST LOW 20 MIN: CPT

## 2022-09-07 PROCEDURE — 76815 OB US LIMITED FETUS(S): CPT

## 2022-09-07 PROCEDURE — 59025 FETAL NON-STRESS TEST: CPT | Performed by: OBSTETRICS & GYNECOLOGY

## 2022-09-07 NOTE — PROGRESS NOTES
L&D Triage Note - OB/GYN  Dee Nagel 29 y o  female MRN: [id number]  Unit/Bed#: LD TRIAGE  Encounter: [id number]        Patient is seen by [facility]     ASSESSMENT/PLAN  Joni Lawrence is a 29 y o   at 27w6d who presents for decreased fetal movement  NST reactive with good fetal movement noted on ultrasound  Discharged home with return precautions  1) Decreased fetal movement   - NST reactive   - Good fetal movement noted on ultrasound       SVE:  Declined    FHT:  Baseline Rate: 140 bpm  Variability: Moderate 6-25 bpm  Accelerations: 10 x 10 (<32 weeks), At variable times, With fetal movment  Decelerations: None    TOCO:   Contraction Frequency (minutes): none    IMAGING:        TAUS   ALESSANDRO      - Q1 3 11 cm     - Q2 0 00 cm     - Q3 3 49 cm     - Q4 4 57 cm     - Total: 11 17cm   Placenta:  Anterior    Presentation: Breech     2)  Discharge instructions  - Patient instructed to call if experiencing contractions, vaginal bleeding, loss of fluid or decreased fetal movement  - Will follow up with OBGYN in office    D/w Dr Romero More  ______________    SUBJECTIVE    MIGUEL: Estimated Date of Delivery: 22    HPI:  29 y o  Kayden Boyer presents with complaint of decreased fetal movement  Reports decreased fetal movement throughout the last day  States that she has felt subtle movement but that she has not felt strong movements  On ultrasound, good fetal movement seen  Patient reports being able to feel baby move and patient reports feeling reassured  Contractions: Denies  Leakage of fluid: Denies  Vaginal Bleeding: Denies  Fetal movement: present but decreased    Her obstetrical history is significant for one prior SAB and vanishing twin in this pregnancy    Review of Systems   Constitutional: Negative  HENT: Negative  Eyes: Negative  Cardiovascular: Negative  Respiratory: Negative  Endocrine: Negative  Hematologic/Lymphatic: Negative  Skin: Negative  Musculoskeletal: Negative  Gastrointestinal: Negative  Genitourinary: Negative  Neurological: Negative  Psychiatric/Behavioral: Negative  Allergic/Immunologic: Negative  Physical Exam  Constitutional:       General: She is not in acute distress  Appearance: Normal appearance  Cardiovascular:      Rate and Rhythm: Normal rate  Pulmonary:      Effort: Pulmonary effort is normal    Abdominal:      Palpations: Abdomen is soft  Tenderness: There is no abdominal tenderness  Comments: Gravid   Neurological:      General: No focal deficit present  Mental Status: She is alert  Skin:     General: Skin is warm and dry  Psychiatric:         Mood and Affect: Mood normal                   OBJECTIVE:  /75   Temp 97 5 °F (36 4 °C) (Oral)   Resp 18   Ht 5' 9" (1 753 m)   Wt 92 1 kg (203 lb)   LMP 02/24/2022   BMI 29 98 kg/m²   Body mass index is 29 98 kg/m²  Labs: No results found for this or any previous visit (from the past 24 hour(s))        Graciela Mcelroy MD  OB/GYN PGY-2  9/7/2022  12:44 PM

## 2022-09-07 NOTE — TELEPHONE ENCOUNTER
Pt calling with concerns for decreased fetal movement  Pt states she sent "[employer] message earlier this morning as she wasn't sure if decreased fm but states wasn't as active as normal especially for in the morning  Pt states she was advised to perform kick counts and monitor  Pt states she ate and drank something and reports noting 8 "maybe fluttery movements" in the last 2 hours  Pt states she knows her placenta is in the front which makes it more difficult but these movements are much more faint and subtle than normal movements  Pt denies any bleeding or spotting and denies any cramping or pain  Pt aware to L&D for evaluation, pt verbalizes understanding and states she can get to L&D within the hour  TT sent to on call provider  L&D made aware

## 2022-09-07 NOTE — PROCEDURES
Juliette Nagel, a  at 27w6d with an MIGUEL of 2022, by Last Menstrual Period, was seen at [address] for the following procedure(s): $Procedure Type: NST, ALESSANDRO]    Nonstress Test  Reason for NST: Decreased Fetal Movement  Variability: Moderate  Decelerations: None  Accelerations: Yes  Acoustic Stimulator: No  Baseline: 140 BPM  Uterine Irritability: No  Contractions: Not present    4 Quadrant ALESSANDRO  ALESSANDRO Q1 (cm): 3 1 cm  ALESSANDRO Q2 (cm): 0 cm  ALESSANDRO Q3 (cm): 3 5 cm  ALESSANDRO Q4 (cm): 5 5 cm  ALESSANDRO TOTAL (cm): 12 1 cm                 Ultrasound Other  Fetal Presentation: Breech  Placenta Location: Anterior    Interpretation  Nonstress Test Interpretation: Reactive

## 2022-09-09 ENCOUNTER — ROUTINE PRENATAL (OUTPATIENT)
Dept: OBGYN CLINIC | Facility: CLINIC | Age: 32
End: 2022-09-09
Payer: COMMERCIAL

## 2022-09-09 VITALS — DIASTOLIC BLOOD PRESSURE: 72 MMHG | WEIGHT: 207.6 LBS | BODY MASS INDEX: 30.66 KG/M2 | SYSTOLIC BLOOD PRESSURE: 120 MMHG

## 2022-09-09 DIAGNOSIS — Z23 NEED FOR DIPHTHERIA-TETANUS-PERTUSSIS (TDAP) VACCINE: ICD-10-CM

## 2022-09-09 DIAGNOSIS — F41.9 ANXIETY: ICD-10-CM

## 2022-09-09 DIAGNOSIS — Z98.890 HISTORY OF BACK SURGERY: ICD-10-CM

## 2022-09-09 DIAGNOSIS — Z3A.29 29 WEEKS GESTATION OF PREGNANCY: Primary | ICD-10-CM

## 2022-09-09 DIAGNOSIS — B97.7 HIGH RISK HPV INFECTION: ICD-10-CM

## 2022-09-09 PROCEDURE — 90715 TDAP VACCINE 7 YRS/> IM: CPT | Performed by: OBSTETRICS & GYNECOLOGY

## 2022-09-09 PROCEDURE — 90471 IMMUNIZATION ADMIN: CPT | Performed by: OBSTETRICS & GYNECOLOGY

## 2022-09-09 PROCEDURE — PNV: Performed by: OBSTETRICS & GYNECOLOGY

## 2022-09-09 NOTE — ASSESSMENT & PLAN NOTE
- Continue PNV  - Labor precautions reviewed  - Fetal kick counts reviewed  - Third trimester packed given    - Delivery consent signed   - Labs: Reviewed & UTD  - Ultrasounds: Most recent [address] on 22 wnl; Next US scheduled for 22  - Tdap: Administered today  - COVID: J&J x1; Moderna x1  - Delivery:  with epidural  - GBS positive by urine  - RTO in 2 weeks

## 2022-09-09 NOTE — PROGRESS NOTES
OB/GYN  PN Visit  Lewis Preston  [id number]  9/9/2022  3:44 PM  Dr Bharti Kerr MD    S: 29 y o  [id number] 28w1d here for PN visit  She denies contractions  She denies leakage of fluid and vaginal bleeding  She reports good fetal movement  She denies nausea, vomiting, headache, cramping, edema, domestic violence, and smoking  Her pregnancy is complicated by anxiety  O:  Vitals:    09/09/22 1509   BP: 120/72     Physical Exam  Vitals reviewed  Constitutional:       General: She is not in acute distress  Appearance: Normal appearance  She is well-developed  She is not ill-appearing, toxic-appearing or diaphoretic  Cardiovascular:      Rate and Rhythm: Normal rate  Pulmonary:      Effort: Pulmonary effort is normal    Abdominal:      General: There is no distension  Palpations: Abdomen is soft  There is no mass  Tenderness: There is no abdominal tenderness  There is no guarding or rebound  Genitourinary:     Comments: Gravid, nontender  Skin:     General: Skin is warm and dry  Neurological:      Mental Status: She is alert and oriented to person, place, and time     Psychiatric:         Mood and Affect: Mood normal          Behavior: Behavior normal        Fundal height: 28cm  FHT: 140s     A/P:    Problem List        Unprioritized    Anxiety    Current Assessment & Plan     Continue Zoloft 25mg          Celiac disease    OCD (obsessive compulsive disorder)    Vanishing twin syndrome    Overview     Baby B           Dichorionic diamniotic twin pregnancy, antepartum    Abnormal MSAFP (maternal serum alpha-fetoprotein), elevated    29 weeks gestation of pregnancy    Current Assessment & Plan     - Continue PNV  - Labor precautions reviewed  - Fetal kick counts reviewed  - Third trimester packed given    - Delivery consent signed   - Labs: Reviewed & UTD  - Ultrasounds: Most recent [address] on 7/19/22 wnl; Next US scheduled for 9/13/22  - Tdap: Administered today  - COVID: J&J x1; Moderna x1  - Delivery:  with epidural  - GBS positive by urine  - RTO in 2 weeks         High risk HPV infection    Overview     (+) type 16 (-) type 18 (-) other HRHPV  Pap NILM, HPV 16 positive- repeat pap/ colpo 6 weeks PP                   Future Appointments   Date Time Provider Patria Ledy   2022 11:30 AM  [address]   2022  3:15 PM ABIGAIL [facility]   10/7/2022  3:30 PM Laquita Vee MD [address]   10/20/2022  4:00 PM Cesia Khoury MD [address] [place] [address]   2022  3:15 PM Cesia Khoury MD [address]   2022  4:15 PM King Kay MD [facility]   2022  4:15 PM Cesia Khoury MD [facility]   2022  4:15 PM King Kay MD [facility]   2022  4:15 PM Laquita Vee MD [address] MD  2022  3:44 PM

## 2022-09-13 ENCOUNTER — TELEPHONE (OUTPATIENT)
Dept: ANESTHESIOLOGY | Facility: CLINIC | Age: 32
End: 2022-09-13

## 2022-09-13 ENCOUNTER — ULTRASOUND (OUTPATIENT)
Dept: PERINATAL CARE | Facility: OTHER | Age: 32
End: 2022-09-13
Payer: COMMERCIAL

## 2022-09-13 VITALS
BODY MASS INDEX: 30.84 KG/M2 | SYSTOLIC BLOOD PRESSURE: 118 MMHG | WEIGHT: 208.2 LBS | DIASTOLIC BLOOD PRESSURE: 64 MMHG | HEART RATE: 100 BPM | HEIGHT: 69 IN

## 2022-09-13 DIAGNOSIS — O31.10X0 VANISHING TWIN SYNDROME: ICD-10-CM

## 2022-09-13 DIAGNOSIS — O28.0 ABNORMAL MSAFP (MATERNAL SERUM ALPHA-FETOPROTEIN), ELEVATED: ICD-10-CM

## 2022-09-13 DIAGNOSIS — Z3A.28 28 WEEKS GESTATION OF PREGNANCY: Primary | ICD-10-CM

## 2022-09-13 PROCEDURE — 76816 OB US FOLLOW-UP PER FETUS: CPT | Performed by: OBSTETRICS & GYNECOLOGY

## 2022-09-13 PROCEDURE — 99213 OFFICE O/P EST LOW 20 MIN: CPT | Performed by: OBSTETRICS & GYNECOLOGY

## 2022-09-13 NOTE — LETTER
September 14, 2022     Mary Carmen Stephen PA-C  [address]  [facility] [address]    Patient: Lien Nagel   YOB: 1990   Date of Visit: 9/13/2022       Dear Ms Gates: Thank you for referring Kalani Flanagan to me for evaluation  Below are my notes for this consultation  If you have questions, please do not hesitate to call me  I look forward to following your patient along with you  Sincerely,        Vandana Mcnamara MD        CC: No Recipients  Vandana Mcnamara MD  9/14/2022 12:47 PM  Sign when Signing Visit  A fetal ultrasound was completed  See Ob procedures in Epic for an interpretation and recommendations  Do not hesitate to contact us in [facility] if you have questions  Marina Lindsey MD, [facility]  Maternal Fetal Medicine

## 2022-09-14 ENCOUNTER — TELEPHONE (OUTPATIENT)
Dept: OBGYN CLINIC | Facility: CLINIC | Age: 32
End: 2022-09-14

## 2022-09-14 PROBLEM — Z3A.28 28 WEEKS GESTATION OF PREGNANCY: Status: ACTIVE | Noted: 2022-09-09

## 2022-09-14 NOTE — PROGRESS NOTES
A fetal ultrasound was completed  See Ob procedures in Epic for an interpretation and recommendations  Do not hesitate to contact us in [facility] if you have questions  Nita Hines MD, [address] [facility]  Maternal Fetal Medicine

## 2022-09-14 NOTE — TELEPHONE ENCOUNTER
Pt states she was concerned with measurements between recent growth scan and previous growth scan  Pt aware can have provider review but can also call to review with MFM  Pt states she will call MFM and if still has questions or wants to further review will call back

## 2022-09-23 ENCOUNTER — ROUTINE PRENATAL (OUTPATIENT)
Dept: OBGYN CLINIC | Facility: CLINIC | Age: 32
End: 2022-09-23

## 2022-09-23 VITALS — SYSTOLIC BLOOD PRESSURE: 110 MMHG | WEIGHT: 209.6 LBS | DIASTOLIC BLOOD PRESSURE: 72 MMHG | BODY MASS INDEX: 30.95 KG/M2

## 2022-09-23 DIAGNOSIS — Z34.03 ENCOUNTER FOR SUPERVISION OF NORMAL FIRST PREGNANCY IN THIRD TRIMESTER: Primary | ICD-10-CM

## 2022-09-23 PROCEDURE — PNV: Performed by: NURSE PRACTITIONER

## 2022-09-23 NOTE — PROGRESS NOTES
OFFICE VISIT: Denies any N/V, HA, Cramping, VB, LOF, Edema, Domestic Violence, Smoking  + FM  Tolerating PNV  Pt very anxious, had a lot of questions, answered all questions to best of my ability  Urine neg/neg  RTO 2 weeks or sooner as needed

## 2022-10-07 ENCOUNTER — ROUTINE PRENATAL (OUTPATIENT)
Dept: OBGYN CLINIC | Facility: CLINIC | Age: 32
End: 2022-10-07
Payer: COMMERCIAL

## 2022-10-07 VITALS — DIASTOLIC BLOOD PRESSURE: 74 MMHG | BODY MASS INDEX: 31.45 KG/M2 | SYSTOLIC BLOOD PRESSURE: 118 MMHG | WEIGHT: 213 LBS

## 2022-10-07 DIAGNOSIS — Z3A.32 32 WEEKS GESTATION OF PREGNANCY: ICD-10-CM

## 2022-10-07 DIAGNOSIS — Z34.93 PRENATAL CARE IN THIRD TRIMESTER: Primary | ICD-10-CM

## 2022-10-07 PROCEDURE — 90471 IMMUNIZATION ADMIN: CPT

## 2022-10-07 PROCEDURE — PNV: Performed by: STUDENT IN AN ORGANIZED HEALTH CARE EDUCATION/TRAINING PROGRAM

## 2022-10-07 PROCEDURE — 90686 IIV4 VACC NO PRSV 0.5 ML IM: CPT

## 2022-10-07 NOTE — PROGRESS NOTES
Adrien Gar is a 28 yo  at [address] [place] [address] presenting for routine PNC- denies any CTX, LOF or VB  Reports fetal movement but does not always feel regular fetal movement given anterior placenta which makes patient quite anxious- she feels more movement in the evenings when she is at home  Offered patient weekly visits to assist in the anxiety as she has had a prior loss and in early loss of twin gestation this pregnancy  [address] reviewed  Adrien Gar is status post Tdap- flu vaccine given today  Follow up growth ultrasound next week  She is tolerating iron supplementation well- consider repeat CBC at next visit  Return to office in 1 week

## 2022-10-07 NOTE — PATIENT INSTRUCTIONS
Pregnancy at 31 to 34 Weeks   AMBULATORY CARE:   Changes happening with your body: You may continue to have symptoms such as shortness of breath, heartburn, contractions, or swelling of your ankles and feet  You may be gaining about 1 pound a week now  Seek care immediately if:   You develop a severe headache that does not go away  You have new or increased vision changes, such as blurred or spotted vision  You have new or increased swelling in your face or hands  You have vaginal spotting or bleeding  Your water broke or you feel warm water gushing or trickling from your vagina  Call your obstetrician if:   You have more than 5 contractions in 1 hour  You notice any changes in your baby's movements  You have abdominal cramps, pressure, or tightening  You have a change in vaginal discharge  You have chills or a fever  You have vaginal itching, burning, or pain  You have yellow, green, white, or foul-smelling vaginal discharge  You have pain or burning when you urinate, less urine than usual, or pink or bloody urine  You have questions or concerns about your condition or care  How to care for yourself at this stage of your pregnancy:       Eat a variety of healthy foods  Healthy foods include fruits, vegetables, whole-grain breads, low-fat dairy foods, beans, lean meats, and fish  Drink liquids as directed  Ask how much liquid to drink each day and which liquids are best for you  Limit caffeine to less than 200 milligrams each day  Limit your intake of fish to 2 servings each week  Choose fish low in mercury such as canned light tuna, shrimp, salmon, cod, or tilapia  Do not  eat fish high in mercury such as swordfish, tilefish, ayla mackerel, and shark  Manage heartburn  by eating 4 or 5 small meals each day instead of large meals  Avoid spicy food  Manage swelling  by lying down and putting your feet up  Take prenatal vitamins as directed    Your need for certain vitamins and minerals, such as folic acid, increases during pregnancy  Prenatal vitamins provide some of the extra vitamins and minerals you need  Prenatal vitamins may also help to decrease the risk of certain birth defects  Talk to your healthcare provider about exercise  Moderate exercise can help you stay fit  Your healthcare provider will help you plan an exercise program that is safe for you during pregnancy  Do not smoke  Smoking increases your risk of a miscarriage and other health problems during your pregnancy  Smoking can cause your baby to be born too early or weigh less at birth  Ask your healthcare provider for information if you need help quitting  Do not drink alcohol  Alcohol passes from your body to your baby through the placenta  It can affect your baby's brain development and cause fetal alcohol syndrome (FAS)  FAS is a group of conditions that causes mental, behavior, and growth problems  Talk to your healthcare provider before you take any medicines  Many medicines may harm your baby if you take them when you are pregnant  Do not take any medicines, vitamins, herbs, or supplements without first talking to your healthcare provider  Never use illegal or street drugs (such as marijuana or cocaine) while you are pregnant  Safety tips during pregnancy:   Avoid hot tubs and saunas  Do not use a hot tub or sauna while you are pregnant, especially during your first trimester  Hot tubs and saunas may raise your baby's temperature and increase the risk of birth defects  Avoid toxoplasmosis  This is an infection caused by eating raw meat or being around infected cat feces  It can cause birth defects, miscarriages, and other problems  Wash your hands after you touch raw meat  Make sure any meat is well-cooked before you eat it  Avoid raw eggs and unpasteurized milk  Use gloves or ask someone else to clean your cat's litter box while you are pregnant  Changes happening with your baby:  By 34 weeks, your baby may weigh more than 5 pounds  Your baby will be about 12 ½ inches long from the top of the head to the rump (baby's bottom)  Your baby is gaining about ½ pound a week  Your baby's eyes open and close now  Your baby's kicks and movements are more forceful at this time  What you need to know about prenatal care: Your healthcare provider will check your blood pressure and weight  You may also need the following:  A urine test  may also be done to check for sugar and protein  These can be signs of gestational diabetes or infection  Protein in your urine may also be a sign of preeclampsia  Preeclampsia is a condition that can develop during week 20 or later of your pregnancy  It causes high blood pressure, and it can cause problems with your kidneys and other organs  A gestational diabetes screen  may be done  Your healthcare provider may order either a 1-step or 2-step oral glucose tolerance test (OGTT)  1-step OGTT:  Your blood sugar level will be tested after you have not eaten for 8 hours (fasting)  You will then be given a glucose drink  Your level will be tested again 1 hour and 2 hours after you finish the drink  2-step OGTT:  You do not have to fast for the first part of the test  You will have the glucose drink at any time of day  Your blood sugar level will be checked 1 hour later  If your blood sugar is higher than a certain level, another test will be ordered  You will fast and your blood sugar level will be tested  You will have the glucose drink  Your blood will be tested again 1 hour, 2 hours, and 3 hours after you finish the glucose drink  A Tdap vaccine  may be recommended by your healthcare provider  Fundal height  is a measurement of your uterus to check your baby's growth  This number is usually the same as the number of weeks that you have been pregnant   Your healthcare provider may also check your baby's position  Your baby's heart rate  will be checked  Follow up with your obstetrician as directed:  Write down your questions so you remember to ask them during your visits  © Copyright [employer] 2022 Information is for End User's use only and may not be sold, redistributed or otherwise used for commercial purposes  All illustrations and images included in CareNotes® are the copyrighted property of A D A M , Inc  or Issac Langford   The above information is an  only  It is not intended as medical advice for individual conditions or treatments  Talk to your doctor, nurse or pharmacist before following any medical regimen to see if it is safe and effective for you

## 2022-10-10 NOTE — PATIENT INSTRUCTIONS
Thank you for choosing us for your  care today  If you have any questions about your ultrasound or care, please do not hesitate to contact us or your primary obstetrician  Some general instructions for your pregnancy are:    Protect against coronavirus: get vaccinated - pregnant women are increased risk of severe COVID  Notify your primary care doctor if you have any symptoms  Exercise: Aim for 22 minutes per day (150 minutes per week) of regular exercise  Walking is great! Nutrition: aim for calcium-rich and iron-rich foods as well as healthy sources of protein  Learn about Preeclampsia: preeclampsia is a common, serious high blood pressure complication in pregnancy  A blood pressure of 376WHKH (systolic or top number) or 83PEMT (diastolic or bottom number) is not normal and needs evaluation by your doctor  Aspirin is sometimes prescribed in early pregnancy to prevent preeclampsia in women with risk factors - ask your obstetrician if you should be on this medication  If you smoke, try to reduce how many cigarettes you smoke or try to quit completely  Do not vape  Other warning signs to watch out for in pregnancy or postpartum: chest pain, obstructed breathing or shortness of breath, seizures, thoughts of hurting yourself or your baby, bleeding, a painful or swollen leg, fever, or headache (see AWHONN POST-BIRTH Warning Signs campaign)  If these happen call 911  Itching is also not normal in pregnancy and if you experience this, especially over your hands and feet, potentially worse at night, notify your doctors

## 2022-10-11 ENCOUNTER — ULTRASOUND (OUTPATIENT)
Dept: PERINATAL CARE | Facility: OTHER | Age: 32
End: 2022-10-11
Payer: COMMERCIAL

## 2022-10-11 VITALS
DIASTOLIC BLOOD PRESSURE: 68 MMHG | WEIGHT: 214.73 LBS | BODY MASS INDEX: 31.71 KG/M2 | SYSTOLIC BLOOD PRESSURE: 120 MMHG

## 2022-10-11 DIAGNOSIS — Z3A.32 32 WEEKS GESTATION OF PREGNANCY: ICD-10-CM

## 2022-10-11 DIAGNOSIS — O28.0 ABNORMAL MSAFP (MATERNAL SERUM ALPHA-FETOPROTEIN), ELEVATED: Primary | ICD-10-CM

## 2022-10-11 DIAGNOSIS — O31.10X0 VANISHING TWIN SYNDROME: ICD-10-CM

## 2022-10-11 PROBLEM — O30.049 DICHORIONIC DIAMNIOTIC TWIN PREGNANCY, ANTEPARTUM: Status: RESOLVED | Noted: 2022-05-24 | Resolved: 2022-10-11

## 2022-10-11 PROCEDURE — 76816 OB US FOLLOW-UP PER FETUS: CPT | Performed by: OBSTETRICS & GYNECOLOGY

## 2022-10-11 PROCEDURE — 99213 OFFICE O/P EST LOW 20 MIN: CPT | Performed by: OBSTETRICS & GYNECOLOGY

## 2022-10-11 NOTE — PROGRESS NOTES
[address]: Ms Alexis Macias was seen today for fetal growth assessment ultrasound  See ultrasound report under "OB Procedures" tab  The time spent on this established patient on the encounter date included 5 minutes previsit service time reviewing records and precharting, 10 minutes face-to-face service time counseling regarding results and coordinating care, and  5 minutes charting, totalling 20 minutes    Please don't hesitate to contact our office with any concerns or questions   -Bryant Reynolds

## 2022-10-14 ENCOUNTER — TELEMEDICINE (OUTPATIENT)
Dept: ANESTHESIOLOGY | Facility: CLINIC | Age: 32
End: 2022-10-14
Payer: COMMERCIAL

## 2022-10-14 DIAGNOSIS — M54.50 CHRONIC MIDLINE LOW BACK PAIN WITHOUT SCIATICA: Primary | ICD-10-CM

## 2022-10-14 DIAGNOSIS — G89.29 CHRONIC MIDLINE LOW BACK PAIN WITHOUT SCIATICA: Primary | ICD-10-CM

## 2022-10-14 DIAGNOSIS — Z98.890 HISTORY OF BACK SURGERY: ICD-10-CM

## 2022-10-14 PROCEDURE — 99214 OFFICE O/P EST MOD 30 MIN: CPT | Performed by: ANESTHESIOLOGY

## 2022-10-14 NOTE — PROGRESS NOTES
Virtual Regular Visit    Verification of patient location:    Patient is located in the following state in which I hold an active license [place] and [place]      Assessment/Plan:  Dasha Doyle is an otherwise healthy female with chronic back pain who presents to discuss anesthesia considerations for delivery  I explained that her prior surgery and history back pain does not preclude her ability to receive neuraxial anesthesia  I explained that many women have a back pain exacerbation after delivery however it is most often associated with the mechanics of delivery and not the epidural  Most women improved over 6-8 weeks after delivery and only use a short course of NSAIDs for pain relief  Problem List Items Addressed This Visit    None              Reason for visit is No chief complaint on file  Encounter provider [facility] ANESTESIOLOGIST    Provider located at [address] [facility]  32  [phone]      Recent Visits  No visits were found meeting these conditions  Showing recent visits within past 7 days and meeting all other requirements  Future Appointments  No visits were found meeting these conditions  Showing future appointments within next 150 days and meeting all other requirements       The patient was identified by name and date of birth  Juliette Nagel was informed that this is a telemedicine visit and that the visit is being conducted through [employer]  She acknowledged consent and understanding of privacy and security of the video platform  The patient has agreed to participate and understands they can discontinue the visit at any time  Patient is aware this is a billable service  [employer]  Deric Huerta is a 29 y o  female with history of back pain who presents to discuss anesthesia options for delivery  She currently has a breech presentation and is considering  delivery VS version   She has had a L5 microdiscectomy in 2012 and RAYMOND in 2021 for her back pain  These procedures have largely eliminated her back pain issues and on occasion will take NSAIDs but has not needed them this pregnancy  She denies any neurological symptoms associated with back pain  HPI   See above    Past Medical History:   Diagnosis Date   • Abnormal weight gain     last assessed: 11/30/2016   • Anxiety     last assessed: 12/09/2016   • Depression    • High risk HPV infection 2022    (+) type 16 (-) type 18 (-) other HRHPV   • Miscarriage        Past Surgical History:   Procedure Laterality Date   • BACK SURGERY      discectomy L5-S1; last assessed: 07/07/2015   • LUMBAR DISCECTOMY  2012    [facility] @ L5/S1   • SC COLONOSCOPY FLX DX W/COLLJ SPEC WHEN PFRMD N/A 7/28/2016    Procedure: COLONOSCOPY;  Surgeon: Tanner Estrada MD;  Location: [facility]; Service: Gastroenterology       Current Outpatient Medications   Medication Sig Dispense Refill   • Docosahexaenoic Acid (DHA PO) Take by mouth     • Prenatal Vit-Fe Fumarate-FA (PRENATAL 19 PO) Take by mouth     • saccharomyces boulardii (FLORASTOR) 250 mg capsule Take 250 mg by mouth 2 (two) times a day (Patient not taking: Reported on 10/7/2022)     • sertraline (ZOLOFT) 25 mg tablet Take 25 mg by mouth in the morning  No current facility-administered medications for this visit  Allergies   Allergen Reactions   • Hyoscyamine Hallucinations     Patient states she had hallucinations  • Amoxicillin Rash     Other reaction(s): AMOXICILLIN TRIHYDRATE (AMOXICILLIN)   • Gluten Meal - Food Allergy GI Intolerance       Review of Systems   Constitutional: Negative for chills and fever  HENT: Negative for ear pain and sore throat  Eyes: Negative for pain and visual disturbance  Respiratory: Negative for cough and shortness of breath  Cardiovascular: Negative for chest pain and palpitations  Gastrointestinal: Negative for abdominal pain and vomiting     Genitourinary: Negative for dysuria and hematuria  Musculoskeletal: Negative for arthralgias and back pain  Skin: Negative for color change and rash  Neurological: Negative for seizures and syncope  All other systems reviewed and are negative  Video Exam    There were no vitals filed for this visit  Physical Exam  Vitals and nursing note reviewed  Constitutional:       Appearance: Normal appearance  HENT:      Head: Normocephalic and atraumatic  Right Ear: External ear normal       Left Ear: External ear normal       Nose: Nose normal       Mouth/Throat:      Mouth: Mucous membranes are moist       Pharynx: Oropharynx is clear  Eyes:      Conjunctiva/sclera: Conjunctivae normal    Pulmonary:      Effort: Pulmonary effort is normal    Abdominal:      Comments: Normal gravid abdomen   Musculoskeletal:         General: Normal range of motion  Cervical back: Normal range of motion  Neurological:      General: No focal deficit present  Mental Status: She is alert and oriented to person, place, and time  Mental status is at baseline     Psychiatric:         Mood and Affect: Mood normal

## 2022-10-19 NOTE — PROGRESS NOTES
OB/GYN  PN Visit  Shannon Rivera  [id number]  10/20/2022  4:19 PM  Dr Sparkle Day    S: 29 y o  Pecolia Larger 34w0d here for PN visit  She denies contractions but has occasional "lightening crotch" sensations and pressure  She denies leakage of fluid and vaginal bleeding  She reports fetal movement - difficult to feel strong movements with placenta anterior  She denies nausea, vomiting, headache, edema, domestic violence, and smoking  Her pregnancy is complicated by anxiety and breech presentation  O:  Vitals:    10/20/22 1500   BP: 122/80     Physical Exam  Vitals reviewed  Constitutional:       General: She is not in acute distress  Appearance: Normal appearance  She is well-developed  She is not ill-appearing, toxic-appearing or diaphoretic  Cardiovascular:      Rate and Rhythm: Normal rate  Pulmonary:      Effort: Pulmonary effort is normal    Abdominal:      General: There is no distension  Palpations: Abdomen is soft  There is no mass  Tenderness: There is no abdominal tenderness  There is no guarding or rebound  Genitourinary:     Comments: Gravid, nontender  Skin:     General: Skin is warm and dry  Neurological:      Mental Status: She is alert and oriented to person, place, and time  Psychiatric:         Mood and Affect: Mood normal          Behavior: Behavior normal        Fundal height: 33cm  FHT: 124-161bpm (acceleration noted on prolonged doppler)     A/P:      Problem List        Unprioritized    Anxiety    Celiac disease    OCD (obsessive compulsive disorder)    Vanishing twin syndrome    Overview     Baby B           Abnormal MSAFP (maternal serum alpha-fetoprotein), elevated    34 weeks gestation of pregnancy    Current Assessment & Plan     - Continue PNV  - Labor precautions reviewed  - Fetal kick counts reviewed  - Labs: Reviewed & UTD  - Ultrasounds: Most recent US on 10/11/22 wnl;  No further US scheduled at this tie  - Tdap: Administered today  - COVID: J&J x1; Moderna x1  - Flu: Already recieved  - Delivery:  with epidural  - Contraception: Likely POPs, reviewed options  - Breastfeeding: TBD  - GBS positive by urine  - RTO in 1 week         High risk HPV infection    Overview     (+) type 16 (-) type 18 (-) other HRHPV  Pap NILM, HPV 16 positive- repeat pap/ colpo 6 weeks PP           Vanishing twin syndrome    Anemia during pregnancy in third trimester    Current Assessment & Plan     Continue Fe Supplementation  Repeat CBC ordered         History of back surgery    Current Assessment & Plan     S/p anesthesia consultation  Past Surgical History:   Procedure Laterality Date   • BACK SURGERY      discectomy L5-S1; last assessed: 2015   • LUMBAR DISCECTOMY      [facility] @ L5/S1                    Future Appointments   Date Time Provider Patria Villafana   10/27/2022  4:45 PM [facility]   2022  3:15 PM Brianna Quinones MD [facility]   2022  4:15 PM King Kay MD [facility]   2022  4:15 PM Brianna Quinones MD [facility]   2022  4:15 PM King Kay MD [facility]   2022  4:15 PM MD Cesar Beckwith  10/20/2022  4:19 PM

## 2022-10-20 ENCOUNTER — ROUTINE PRENATAL (OUTPATIENT)
Dept: OBGYN CLINIC | Facility: CLINIC | Age: 32
End: 2022-10-20

## 2022-10-20 VITALS
WEIGHT: 217.6 LBS | BODY MASS INDEX: 32.23 KG/M2 | HEIGHT: 69 IN | SYSTOLIC BLOOD PRESSURE: 122 MMHG | DIASTOLIC BLOOD PRESSURE: 80 MMHG

## 2022-10-20 DIAGNOSIS — O31.10X0 VANISHING TWIN SYNDROME: ICD-10-CM

## 2022-10-20 DIAGNOSIS — Z3A.34 34 WEEKS GESTATION OF PREGNANCY: Primary | ICD-10-CM

## 2022-10-20 DIAGNOSIS — O99.013 ANEMIA DURING PREGNANCY IN THIRD TRIMESTER: ICD-10-CM

## 2022-10-20 DIAGNOSIS — Z98.890 HISTORY OF BACK SURGERY: ICD-10-CM

## 2022-10-20 DIAGNOSIS — B97.7 HIGH RISK HPV INFECTION: ICD-10-CM

## 2022-10-20 DIAGNOSIS — O28.0 ABNORMAL MSAFP (MATERNAL SERUM ALPHA-FETOPROTEIN), ELEVATED: ICD-10-CM

## 2022-10-20 PROCEDURE — PNV: Performed by: OBSTETRICS & GYNECOLOGY

## 2022-10-20 NOTE — ASSESSMENT & PLAN NOTE
- Continue PNV  - Labor precautions reviewed  - Fetal kick counts reviewed  - Labs: Reviewed & UTD  - Ultrasounds: Most recent US on 10/11/22 wnl; No further US scheduled at this tie  - Tdap: Administered today  - COVID: J&J x1; Moderna x1  - Flu: Already recieved  - Delivery:  with epidural if VTX; If fetus remains breech, patient declines ECV  Will plan for 39 week 1LTCS     - Contraception: Likely POPs, reviewed options  - Breastfeeding: TBD  - GBS positive by urine  - RTO in 1 week

## 2022-10-20 NOTE — ASSESSMENT & PLAN NOTE
S/p anesthesia consultation  Past Surgical History:   Procedure Laterality Date   • BACK SURGERY      discectomy L5-S1; last assessed: 07/07/2015   • LUMBAR DISCECTOMY  2012    [facility] @ L5/S1

## 2022-10-21 ENCOUNTER — TELEPHONE (OUTPATIENT)
Dept: OBGYN CLINIC | Facility: CLINIC | Age: 32
End: 2022-10-21

## 2022-10-21 ENCOUNTER — HOSPITAL ENCOUNTER (OUTPATIENT)
Facility: HOSPITAL | Age: 32
Discharge: HOME | End: 2022-10-21
Attending: OBSTETRICS & GYNECOLOGY | Admitting: OBSTETRICS & GYNECOLOGY
Payer: COMMERCIAL

## 2022-10-21 VITALS
DIASTOLIC BLOOD PRESSURE: 83 MMHG | TEMPERATURE: 98.2 F | SYSTOLIC BLOOD PRESSURE: 132 MMHG | OXYGEN SATURATION: 97 % | HEART RATE: 97 BPM

## 2022-10-21 LAB
ERYTHROCYTE [DISTWIDTH] IN BLOOD BY AUTOMATED COUNT: 12.6 % (ref 11.6–15.1)
HCT VFR BLD AUTO: 31.4 % (ref 34.8–46.1)
HGB BLD-MCNC: 10.4 G/DL (ref 11.5–15.4)
MCH RBC QN AUTO: 30.3 PG (ref 26.8–34.3)
MCHC RBC AUTO-ENTMCNC: 33.1 G/DL (ref 31.4–37.4)
MCV RBC AUTO: 92 FL (ref 82–98)
PLATELET # BLD AUTO: 198 THOUSANDS/UL (ref 149–390)
PMV BLD AUTO: 11.4 FL (ref 8.9–12.7)
RBC # BLD AUTO: 3.43 MILLION/UL (ref 3.81–5.12)
WBC # BLD AUTO: 9.26 THOUSAND/UL (ref 4.31–10.16)

## 2022-10-21 PROCEDURE — 99214 OFFICE O/P EST MOD 30 MIN: CPT

## 2022-10-21 PROCEDURE — 85027 COMPLETE CBC AUTOMATED: CPT

## 2022-10-21 RX ADMIN — SODIUM CHLORIDE, SODIUM LACTATE, POTASSIUM CHLORIDE, AND CALCIUM CHLORIDE 1000 ML: .6; .31; .03; .02 INJECTION, SOLUTION INTRAVENOUS at 19:54

## 2022-10-21 NOTE — PROGRESS NOTES
L&D Triage Note - OB/GYN  Vale Nagel 29 y o  female MRN: [id number]  Unit/Bed#: L&D 326-01 Encounter: [id number]      Assessment:  29 y o   at 34w1d presenting with decreased fetal movement and lightheadedness  She feels fetal movement in triage  Plan:  1  Decreased fetal movement   ALESSANDRO 10 6   Breech presentation, anterior placenta   NST reactive    2  Lightheadedness   1L LR bolus   Orthostatic vitals +HR response, BP consistently 130s/80s   CBC: Hgb 10 4, known anemia   Patient improvement in symptoms    D/C home with outpatient follow up  D/W Dr Gely Jerome    ______________________________________________________________________      Chief Complaint: decreased movement    Subjective:  29 y o   at 34w1d presenting with decreased fetal movement for the last day  She has felt significantly less movement all day today  She does not do kick counts every day  She endorses irregular contractions, but denies bleeding and LOF  She has a history of a vanishing twin, anxiety and anemia  She also endorses lightheadedness and dizziness on arrival to L&D  ROS:   Review of Systems   Constitutional: Negative for chills and fever  HENT: Negative for ear pain and sore throat  Eyes: Negative for pain and visual disturbance  Respiratory: Negative for cough and shortness of breath  Cardiovascular: Negative for chest pain and palpitations  Gastrointestinal: Negative for abdominal pain and vomiting  Genitourinary: Negative for dysuria and hematuria  Musculoskeletal: Negative for arthralgias and back pain  Skin: Negative for color change and rash  Neurological: Positive for dizziness and light-headedness  Negative for seizures and syncope  All other systems reviewed and are negative  Objective:  Vitals:    10/21/22 1829   BP:    Pulse: (!) 107   Temp: 98 2 °F (36 8 °C)   SpO2: 98%       Physical Exam  Constitutional:       Appearance: Normal appearance     Genitourinary:      Vulva normal    Cardiovascular:      Pulses: Normal pulses  Pulmonary:      Effort: Pulmonary effort is normal    Abdominal:      Palpations: Abdomen is soft  Tenderness: There is no abdominal tenderness  Neurological:      Mental Status: She is alert  Skin:     General: Skin is warm and dry  Psychiatric:         Mood and Affect: Mood normal          Behavior: Behavior normal    Vitals reviewed            SVE: 0 / 0% / -4  FHT:  reactive  [place]: no contractions    TAUS  ALESSANDRO: 10 6  Breech  Anterior placenta    [facility] 10/21/2022 7:11 PM

## 2022-10-21 NOTE — TELEPHONE ENCOUNTER
----- Message from Nahum Goncalves MD sent at 10/20/2022  4:21 PM EDT -----  Regarding: Nina Stack would like a 1LTCS if her baby stays breech (baby has been breech for a while now)  Will reassess in 2 weeks  She declined version  If she needs 1LTCS, she would like to schedule for 11/25 (day after Thanksgiving) as she is 39w0d on Thanksgiving and is too nervous to wait through the weekend    can we go ahead and grab that last spot?

## 2022-10-21 NOTE — TELEPHONE ENCOUNTER
Spoke to patient, states movement has been less and less intense  Does get some kicks when she drinks cold water  Patient will be evaluated at L&D at [facility] for decrease fetal movement since [facility] on divert

## 2022-10-22 NOTE — PROGRESS NOTES
L&D Triage Note - OB/GYN  Cynda Form Robe 29 y o  female MRN: [id number]  Unit/Bed#: L&D 326-01 Encounter: [id number]      Assessment:  29 y o   at 34w1d ***    Plan:  1  ***      ______________________________________________________________________      Chief Complaint: ***    TIME: ***  Subjective:  29 y o  Lowell Jarrett at 34w1d ***    ROS:   Review of Systems   Constitutional: Negative for chills and fever  HENT: Negative for ear pain and sore throat  Eyes: Negative for pain and visual disturbance  Respiratory: Negative for cough and shortness of breath  Cardiovascular: Negative for chest pain and palpitations  Gastrointestinal: Negative for abdominal pain and vomiting  Genitourinary: Negative for dysuria and hematuria  Musculoskeletal: Negative for arthralgias and back pain  Skin: Negative for color change and rash  Neurological: Positive for dizziness and light-headedness  Negative for seizures and syncope  All other systems reviewed and are negative  Objective:  Vitals:    10/21/22 1829   BP:    Pulse: (!) 107   Temp: 98 2 °F (36 8 °C)   SpO2: 98%       Physical Exam  Constitutional:       Appearance: Normal appearance  Genitourinary:      Vulva normal    Cardiovascular:      Pulses: Normal pulses  Pulmonary:      Effort: Pulmonary effort is normal  No respiratory distress  Abdominal:      Palpations: Abdomen is soft  Tenderness: There is no abdominal tenderness  Neurological:      Mental Status: She is alert  Skin:     General: Skin is warm and dry  Psychiatric:         Mood and Affect: Mood normal          Behavior: Behavior normal    Vitals reviewed            SVE: {gen number 2-09:242777} / {Effacement :59708} / {[facility]:93064}  SSE: ***   FHT:  *** / {FHR Variability:72326} / ***, ***  [place]: q***    Wet mount/KOH: ***  Rupture workup: Nitrazine ***, *** Ferning, *** Pooling    TAUS  ALESSANDRO: ***  Vertex ***  TVUS:    Cervical length: ***   Vertex ***   *** naomi, *** dynamic changes      Esdras Seth 10/21/2022 7:11 PM

## 2022-10-27 ENCOUNTER — ROUTINE PRENATAL (OUTPATIENT)
Dept: OBGYN CLINIC | Facility: CLINIC | Age: 32
End: 2022-10-27

## 2022-10-27 VITALS — WEIGHT: 218 LBS | SYSTOLIC BLOOD PRESSURE: 120 MMHG | DIASTOLIC BLOOD PRESSURE: 84 MMHG | BODY MASS INDEX: 32.19 KG/M2

## 2022-10-27 DIAGNOSIS — Z34.93 PRENATAL CARE IN THIRD TRIMESTER: Primary | ICD-10-CM

## 2022-10-27 DIAGNOSIS — O99.013 ANEMIA DURING PREGNANCY IN THIRD TRIMESTER: ICD-10-CM

## 2022-10-27 DIAGNOSIS — Z3A.35 35 WEEKS GESTATION OF PREGNANCY: ICD-10-CM

## 2022-10-27 PROCEDURE — PNV: Performed by: STUDENT IN AN ORGANIZED HEALTH CARE EDUCATION/TRAINING PROGRAM

## 2022-10-27 RX ORDER — SODIUM CHLORIDE 9 MG/ML
20 INJECTION, SOLUTION INTRAVENOUS ONCE
Status: CANCELLED | OUTPATIENT
Start: 2022-11-04

## 2022-10-27 NOTE — PATIENT INSTRUCTIONS
Pregnancy at 28 to 38 Weeks   AMBULATORY CARE:   Changes happening with your body: You are considered full term at the beginning of 37 weeks  Your breathing may be easier if your baby has moved down into a head-down position  You may need to urinate more often because the baby may be pressing on your bladder  You may also feel more discomfort and get tired easily  Seek care immediately if:   You develop a severe headache that does not go away  You have new or increased vision changes, such as blurred or spotted vision  You have new or increased swelling in your face or hands  You have vaginal spotting or bleeding  Your water broke or you feel warm water gushing or trickling from your vagina  Call your obstetrician if:   You have more than 5 contractions in 1 hour  You notice any changes in your baby's movements  You have abdominal cramps, pressure, or tightening  You have a change in vaginal discharge  You have chills or a fever  You have vaginal itching, burning, or pain  You have yellow, green, white, or foul-smelling vaginal discharge  You have pain or burning when you urinate, less urine than usual, or pink or bloody urine  You have questions or concerns about your condition or care  How to care for yourself at this stage of your pregnancy:       Eat a variety of healthy foods  Healthy foods include fruits, vegetables, whole-grain breads, low-fat dairy foods, beans, lean meats, and fish  Drink liquids as directed  Ask how much liquid to drink each day and which liquids are best for you  Limit caffeine to less than 200 milligrams each day  Limit your intake of fish to 2 servings each week  Choose fish low in mercury such as canned light tuna, shrimp, salmon, cod, or tilapia  Do not  eat fish high in mercury such as swordfish, tilefish, ayla mackerel, and shark  Take prenatal vitamins as directed    Your need for certain vitamins and minerals, such as folic acid, increases during pregnancy  Prenatal vitamins provide some of the extra vitamins and minerals you need  Prenatal vitamins may also help to decrease the risk of certain birth defects  Rest as needed  Put your feet up if you have swelling in your ankles and feet  Talk to your healthcare provider about exercise  Moderate exercise can help you stay fit  Your healthcare provider will help you plan an exercise program that is safe for you during pregnancy  Do not smoke  Smoking increases your risk of a miscarriage and other health problems during your pregnancy  Smoking can cause your baby to be born early or weigh less at birth  Ask your healthcare provider for information if you need help quitting  Do not drink alcohol  Alcohol passes from your body to your baby through the placenta  It can affect your baby's brain development and cause fetal alcohol syndrome (FAS)  FAS is a group of conditions that causes mental, behavior, and growth problems  Talk to your healthcare provider before you take any medicines  Many medicines may harm your baby if you take them when you are pregnant  Do not take any medicines, vitamins, herbs, or supplements without first talking to your healthcare provider  Never use illegal or street drugs (such as marijuana or cocaine) while you are pregnant  Safety tips during pregnancy:   Avoid hot tubs and saunas  Do not use a hot tub or sauna while you are pregnant, especially during your first trimester  Hot tubs and saunas may raise your baby's temperature and increase the risk of birth defects  Avoid toxoplasmosis  This is an infection caused by eating raw meat or being around infected cat feces  It can cause birth defects, miscarriages, and other problems  Wash your hands after you touch raw meat  Make sure any meat is well-cooked before you eat it  Avoid raw eggs and unpasteurized milk   Use gloves or ask someone else to clean your cat's litter box while you are pregnant  Ask your healthcare provider about travel  The most comfortable time to travel is during the second trimester  Ask your provider if you can travel after 36 weeks  You may not be able to travel in an airplane after 36 weeks  He or she may also recommend you avoid long road trips  Changes happening with your baby:  By 38 weeks, your baby may weigh between 6 and 9 pounds  Your baby may be about 14 inches long from the top of the head to the rump (baby's bottom)  Your baby hears well enough to know your voice  As your baby gets larger, you may feel fewer kicks and more stretching and rolling  Your baby may move into a head-down position  Your baby will also rest lower in your abdomen  What you need to know about prenatal care: Your healthcare provider will check your blood pressure and weight  You may also need the following:  A urine test  may also be done to check for sugar and protein  These can be signs of gestational diabetes or infection  Protein in your urine may also be a sign of preeclampsia  Preeclampsia is a condition that can develop during week 20 or later of your pregnancy  It causes high blood pressure, and it can cause problems with your kidneys and other organs  A gestational diabetes screen  may be done  Your healthcare provider may order either a 1-step or 2-step oral glucose tolerance test (OGTT)  1-step OGTT:  Your blood sugar level will be tested after you have not eaten for 8 hours (fasting)  You will then be given a glucose drink  Your level will be tested again 1 hour and 2 hours after you finish the drink  2-step OGTT:  You do not have to fast for the first part of the test  You will have the glucose drink at any time of day  Your blood sugar level will be checked 1 hour later  If your blood sugar is higher than a certain level, another test will be ordered  You will fast and your blood sugar level will be tested  You will have the glucose drink  Your blood will be tested again 1 hour, 2 hours, and 3 hours after you finish the glucose drink  A blood test  may be done to check for anemia (low iron level)  A Tdap vaccine  may be recommended by your healthcare provider  A group B strep test  is a test that is done to check for group B strep infection  Group B strep is a type of bacteria that may be found in the vagina or rectum  It can be passed to your baby during delivery if you have it  Your healthcare provider will take swab your vagina or rectum and send the sample to the lab for tests  Fundal height  is a measurement of your uterus to check your baby's growth  This number is usually the same as the number of weeks that you have been pregnant  Your healthcare provider may also check your baby's position  Your baby's heart rate  will be checked  Follow up with your obstetrician as directed:  Write down your questions so you remember to ask them during your visits  © Copyright [employer] 2022 Information is for End User's use only and may not be sold, redistributed or otherwise used for commercial purposes  All illustrations and images included in CareNotes® are the copyrighted property of [employer]  or [facility] [place] [facility]   The above information is an  only  It is not intended as medical advice for individual conditions or treatments  Talk to your doctor, nurse or pharmacist before following any medical regimen to see if it is safe and effective for you

## 2022-10-27 NOTE — PROGRESS NOTES
Osman Chappell is a 30 yo  at 35 weeks gestation presenting for routine PNC- denies any CTX, LOF or VB  Endorses FM  Urine neg/neg  Does report continued leg cramps- reviewed taking PO magnesium supplementation  Continues to have some dizziness- has been on PO iron supplementation and Hg 10/21 was 10 4 form 10 8- patient interested in a few venofer transfusions for anemia especially since she may be having a  section for breech presentation  Osman Chappell has significant anxiety because although she does feel regular movement she does not feel it all of the time and has a lot of anxiety revolving around prior miscarriage and vanishing twin at the beginning of this pregnancy  Will plan for NST weekly at 36 weeks until delivery at 39 weeks gestation  Osman Chappell had numerous questions surrounding  section which were all answered to the best of my ability  [address]  Reviewed- Pateint to RTO in 1 week

## 2022-10-28 ENCOUNTER — TELEPHONE (OUTPATIENT)
Dept: OBGYN CLINIC | Facility: CLINIC | Age: 32
End: 2022-10-28

## 2022-10-28 ENCOUNTER — TELEPHONE (OUTPATIENT)
Dept: LABOR AND DELIVERY | Facility: HOSPITAL | Age: 32
End: 2022-10-28

## 2022-10-28 NOTE — TELEPHONE ENCOUNTER
Scheduled 11/4 at 2:30 at [facility] infusion center  Mychart message sent to pt with details  Staff message sent to provider to modify start date

## 2022-10-28 NOTE — TELEPHONE ENCOUNTER
Pt prefers [facility] but can go to [facility] as second option as well  Pt prefers latest in the afternoon if possible  Pt aware will schedule initial infusion and will call pt back with details

## 2022-10-28 NOTE — TELEPHONE ENCOUNTER
----- Message from Ivis Skinner MD sent at 10/27/2022  6:06 PM EDT -----  Regarding: Venofer transfusion  Can you please help Juliette set up her first venofer transfusion? Thank you!

## 2022-10-28 NOTE — TELEPHONE ENCOUNTER
Made aware [facility] has infusion center, called pt to see if would be more convenient location  Pt states if later afternoon appt (2:30) would prefer [facility] but if earlier, either [facility] or [facility] is fine

## 2022-11-01 ENCOUNTER — ROUTINE PRENATAL (OUTPATIENT)
Dept: OBGYN CLINIC | Facility: CLINIC | Age: 32
End: 2022-11-01

## 2022-11-01 VITALS
HEART RATE: 98 BPM | DIASTOLIC BLOOD PRESSURE: 98 MMHG | SYSTOLIC BLOOD PRESSURE: 134 MMHG | BODY MASS INDEX: 32.73 KG/M2 | HEIGHT: 69 IN | WEIGHT: 221 LBS

## 2022-11-01 DIAGNOSIS — O31.10X0 VANISHING TWIN SYNDROME: Primary | ICD-10-CM

## 2022-11-01 DIAGNOSIS — Z3A.35 35 WEEKS GESTATION OF PREGNANCY: ICD-10-CM

## 2022-11-01 DIAGNOSIS — B97.7 HIGH RISK HPV INFECTION: ICD-10-CM

## 2022-11-01 DIAGNOSIS — O28.0 ABNORMAL MSAFP (MATERNAL SERUM ALPHA-FETOPROTEIN), ELEVATED: ICD-10-CM

## 2022-11-01 DIAGNOSIS — Z98.890 HISTORY OF BACK SURGERY: ICD-10-CM

## 2022-11-01 DIAGNOSIS — R03.0 ELEVATED BLOOD PRESSURE READING IN OFFICE WITHOUT DIAGNOSIS OF HYPERTENSION: ICD-10-CM

## 2022-11-01 NOTE — PROGRESS NOTES
OB/GYN  PN Visit  Efrain Jean  [id number]  11/1/2022  2:21 PM  Dr Jimenez Rule: 29 y o  Samantha Luis 35w5d here for PN visit  She denies contractions but reports menstraul style cramping  She denies leakage of fluid and vaginal bleeding  She reports good fetal movement  She denies nausea, vomiting, headache, domestic violence, and smoking  She has edema in her hands and feet  Her pregnancy is complicated by anxiety and breech presentation  O:  Vitals:    11/01/22 1500   BP: 134/98   Pulse: 98     Physical Exam  Vitals reviewed  Constitutional:       General: She is not in acute distress  Appearance: Normal appearance  She is well-developed  She is not ill-appearing, toxic-appearing or diaphoretic  Cardiovascular:      Rate and Rhythm: Normal rate  Pulmonary:      Effort: Pulmonary effort is normal    Abdominal:      General: There is no distension  Palpations: Abdomen is soft  There is no mass  Tenderness: There is no abdominal tenderness  There is no guarding or rebound  Genitourinary:     Comments: Gravid, nontender  Skin:     General: Skin is warm and dry  Neurological:      Mental Status: She is alert and oriented to person, place, and time  Psychiatric:         Mood and Affect: Mood normal          Behavior: Behavior normal        NST: 140/ moderate/ accelerations/ no decelerations     A/P:    Problem List        Unprioritized    Anxiety    Celiac disease    OCD (obsessive compulsive disorder)    Vanishing twin syndrome    Overview     Baby B           Abnormal MSAFP (maternal serum alpha-fetoprotein), elevated    35 weeks gestation of pregnancy    Current Assessment & Plan     - Continue PNV  - Labor precautions reviewed  - Fetal kick counts reviewed  - Labs: Reviewed & UTD  - Ultrasounds: Most recent US on 10/11/22 wnl;  No further US scheduled at this tie  - Tdap: Administered 9/9/22  - COVID: J&J x1; Moderna x1  - Flu: Already recieved  - Delivery: Scheduled for 1LTCS due to breech presentation  - Contraception: Likely POPs, reviewed options  - Breastfeeding: Yes  - GBS positive by urine  - RTO in 1 week           High risk HPV infection    Overview     (+) type 16 (-) type 18 (-) other HRHPV  Pap NILM, HPV 16 positive- repeat pap/ colpo 6 weeks PP           Vanishing twin syndrome    Anemia during pregnancy in third trimester    History of back surgery    Elevated blood pressure reading in office without diagnosis of hypertension    Current Assessment & Plan     Elevated BP noted today in the office x2  This is her first elevated blood pressure this pregnancy  No symptoms of PreE at this time  Urine dip in the office was negative for protein  Patient will complete preeclampsia labs outpatient tomorrow  Reviewed symptoms of PreE and patient to monitor                 Future Appointments   Date Time Provider Patria Villafana   11/4/2022  2:30 PM MO INF CHAIR 3 MO Infusion [facility]   11/11/2022  3:30 PM OBGYN NURSE [facility]   11/11/2022  4:15 PM Vijay Quintana MD [facility]   11/18/2022  3:30 PM OBGYN NON STRESS MACHINE [facility]   11/18/2022  4:15 PM Charlene Steward MD [id number] [facility]  11/2/2022  2:21 PM

## 2022-11-02 ENCOUNTER — HOSPITAL ENCOUNTER (OUTPATIENT)
Facility: HOSPITAL | Age: 32
Discharge: HOME | End: 2022-11-02
Attending: OBSTETRICS & GYNECOLOGY | Admitting: OBSTETRICS & GYNECOLOGY

## 2022-11-02 ENCOUNTER — TELEPHONE (OUTPATIENT)
Dept: OBGYN CLINIC | Facility: CLINIC | Age: 32
End: 2022-11-02

## 2022-11-02 VITALS
SYSTOLIC BLOOD PRESSURE: 111 MMHG | TEMPERATURE: 98.5 F | BODY MASS INDEX: 32.73 KG/M2 | HEIGHT: 69 IN | HEART RATE: 104 BPM | RESPIRATION RATE: 18 BRPM | DIASTOLIC BLOOD PRESSURE: 62 MMHG | OXYGEN SATURATION: 98 % | WEIGHT: 221 LBS

## 2022-11-02 PROBLEM — R03.0 ELEVATED BLOOD PRESSURE READING IN OFFICE WITHOUT DIAGNOSIS OF HYPERTENSION: Status: ACTIVE | Noted: 2022-11-02

## 2022-11-02 LAB
ALBUMIN SERPL BCP-MCNC: 3.6 G/DL (ref 3.5–5)
ALP SERPL-CCNC: 89 U/L (ref 34–104)
ALT SERPL W P-5'-P-CCNC: 10 U/L (ref 7–52)
ANION GAP SERPL CALCULATED.3IONS-SCNC: 8 MMOL/L (ref 4–13)
AST SERPL W P-5'-P-CCNC: 13 U/L (ref 13–39)
BILIRUB SERPL-MCNC: 0.22 MG/DL (ref 0.2–1)
BILIRUB UR QL STRIP: NEGATIVE
BUN SERPL-MCNC: 8 MG/DL (ref 5–25)
CALCIUM SERPL-MCNC: 9.3 MG/DL (ref 8.4–10.2)
CHLORIDE SERPL-SCNC: 106 MMOL/L (ref 96–108)
CLARITY UR: CLEAR
CO2 SERPL-SCNC: 22 MMOL/L (ref 21–32)
COLOR UR: COLORLESS
CREAT SERPL-MCNC: 0.56 MG/DL (ref 0.6–1.3)
CREAT UR-MCNC: 25.9 MG/DL
ERYTHROCYTE [DISTWIDTH] IN BLOOD BY AUTOMATED COUNT: 13.1 % (ref 11.6–15.1)
GFR SERPL CREATININE-BSD FRML MDRD: 125 ML/MIN/1.73SQ M
GLUCOSE SERPL-MCNC: 78 MG/DL (ref 65–140)
GLUCOSE UR STRIP-MCNC: NEGATIVE MG/DL
HCT VFR BLD AUTO: 32.9 % (ref 34.8–46.1)
HGB BLD-MCNC: 10.6 G/DL (ref 11.5–15.4)
HGB UR QL STRIP.AUTO: NEGATIVE
KETONES UR STRIP-MCNC: NEGATIVE MG/DL
LEUKOCYTE ESTERASE UR QL STRIP: NEGATIVE
MCH RBC QN AUTO: 29.4 PG (ref 26.8–34.3)
MCHC RBC AUTO-ENTMCNC: 32.2 G/DL (ref 31.4–37.4)
MCV RBC AUTO: 91 FL (ref 82–98)
NITRITE UR QL STRIP: NEGATIVE
PH UR STRIP.AUTO: 7 [PH]
PLATELET # BLD AUTO: 191 THOUSANDS/UL (ref 149–390)
PMV BLD AUTO: 11.9 FL (ref 8.9–12.7)
POTASSIUM SERPL-SCNC: 3.9 MMOL/L (ref 3.5–5.3)
PROT SERPL-MCNC: 6.7 G/DL (ref 6.4–8.4)
PROT UR STRIP-MCNC: NEGATIVE MG/DL
PROT UR-MCNC: 5 MG/DL
PROT/CREAT UR: 0.19 MG/G{CREAT} (ref 0–0.1)
RBC # BLD AUTO: 3.61 MILLION/UL (ref 3.81–5.12)
SODIUM SERPL-SCNC: 136 MMOL/L (ref 135–147)
SP GR UR STRIP.AUTO: 1.01 (ref 1–1.03)
UROBILINOGEN UR STRIP-ACNC: <2 MG/DL
WBC # BLD AUTO: 8.96 THOUSAND/UL (ref 4.31–10.16)

## 2022-11-02 NOTE — TELEPHONE ENCOUNTER
Pt lmom - just had BP checked through school nurse  Was 144/88 and starting to have chest pain on right hand side  Wants to make sure doesn't need to do anything else other than get her blood work done today

## 2022-11-02 NOTE — PROGRESS NOTES
Triage Note - OB  Juliette Nagel 29 y o  female MRN: [id number]  Unit/Bed#: LD TRIAGE 4 Encounter: [id number]    OB TRIAGE NOTE  Peter Millan  [id number]  2022  4:41 PM  LD TRIAGE 4/LD TRIAGE 4-*    ASSESS:  29 y o   35w6d with elevated BP and chest pain while at work, chest pain now resolved  Bps and labs wnl, no concern for Pre-E at this time  PLAN  #1  R/o Pre-Eclampsia  · Bps in triage 110s-130s/60s-80s  · CBC, CMP wnl, P/Cr 0 19  · Asymptomatic in triage    #2  35 week pregnancy  · FHT reactive with uterine irritability, no contractions  · SVE declined    #3  Discharge instructions  · Patient instructed to call if experiencing worsening contractions, vaginal bleeding, loss of fluid or decreased fetal movement  · Will follow up with OBGYN on 22  D/w Dr Khan Spark  ______________    SUBJECTIVE    MIGUEL: Estimated Date of Delivery: 22    HPI Chronology:  29 y o   35w6d presents with complaint of chest pain after which she had the nurse at school take her BP  She reports it was 140s/80s  She had one elevated BP in the office recently was to do outpatient Pre-e labs later today  She denies any headaches, RUQ or epigastric pain, or new onset swelling  She states she SOB at baseline that improves with position  Contractions: no  Leakage: no  Bleeding: no  Fetal Movement: yes  Pelvic pain: no    Vitals:   /68   Pulse 104   Temp 98 5 °F (36 9 °C) (Oral)   Resp 18   Ht 5' 9" (1 753 m)   Wt 100 kg (221 lb)   LMP 2022   SpO2 98%   BMI 32 64 kg/m²   Body mass index is 32 64 kg/m²  Review of Systems   Constitutional: Negative for chills and fever  Eyes: Negative for visual disturbance  Respiratory: Positive for shortness of breath  Negative for chest tightness  Cardiovascular: Negative for chest pain and palpitations  Gastrointestinal: Negative for abdominal pain  Genitourinary: Negative for vaginal bleeding, vaginal discharge and vaginal pain  Musculoskeletal: Negative for back pain  Neurological: Negative for headaches  Psychiatric/Behavioral: The patient is nervous/anxious  Physical Exam  HENT:      Head: Normocephalic  Mouth/Throat:      Pharynx: Oropharynx is clear  Eyes:      Conjunctiva/sclera: Conjunctivae normal    Cardiovascular:      Rate and Rhythm: Tachycardia present  Pulses: Normal pulses  Pulmonary:      Effort: Pulmonary effort is normal    Abdominal:      Palpations: Abdomen is soft  Tenderness: There is no abdominal tenderness  Skin:     General: Skin is warm  Neurological:      Mental Status: She is alert     Psychiatric:         Mood and Affect: Mood normal       Comments: Anxious           FHT:  Baseline Rate: 150 bpm  Variability: Moderate 6-25 bpm  Accelerations: 15 x 15 or greater  Decelerations: None  FHR Category: Category I  TOCO:   Contraction Frequency (minutes): irritability    Labs:   Recent Results (from the past 24 hour(s))   UA w Reflex to Microscopic w Reflex to Culture    Collection Time: 11/02/22  4:03 PM    Specimen: Urine, Clean Catch   Result Value Ref Range    Color, UA Colorless     Clarity, UA Clear     Specific Gravity, UA 1 006 1 003 - 1 030    pH, UA 7 0 4 5, 5 0, 5 5, 6 0, 6 5, 7 0, 7 5, 8 0    Leukocytes, UA Negative Negative    Nitrite, UA Negative Negative    Protein, UA Negative Negative mg/dl    Glucose, UA Negative Negative mg/dl    Ketones, UA Negative Negative mg/dl    Urobilinogen, UA <2 0 <2 0 mg/dl mg/dl    Bilirubin, UA Negative Negative    Occult Blood, UA Negative Negative   CBC    Collection Time: 11/02/22  4:03 PM   Result Value Ref Range    WBC 8 96 4 31 - 10 16 Thousand/uL    RBC 3 61 (L) 3 81 - 5 12 Million/uL    Hemoglobin 10 6 (L) 11 5 - 15 4 g/dL    Hematocrit 32 9 (L) 34 8 - 46 1 %    MCV 91 82 - 98 fL    MCH 29 4 26 8 - 34 3 pg    MCHC 32 2 31 4 - 37 4 g/dL    RDW 13 1 11 6 - 15 1 %    Platelets 997 052 - 640 Thousands/uL    MPV 11 9 8 9 - 12 7 fL Comprehensive metabolic panel    Collection Time: 11/02/22  4:03 PM   Result Value Ref Range    Sodium 136 135 - 147 mmol/L    Potassium 3 9 3 5 - 5 3 mmol/L    Chloride 106 96 - 108 mmol/L    CO2 22 21 - 32 mmol/L    ANION GAP 8 4 - 13 mmol/L    BUN 8 5 - 25 mg/dL    Creatinine 0 56 (L) 0 60 - 1 30 mg/dL    Glucose 78 65 - 140 mg/dL    Calcium 9 3 8 4 - 10 2 mg/dL    AST 13 13 - 39 U/L    ALT 10 7 - 52 U/L    Alkaline Phosphatase 89 34 - 104 U/L    Total Protein 6 7 6 4 - 8 4 g/dL    Albumin 3 6 3 5 - 5 0 g/dL    Total Bilirubin 0 22 0 20 - 1 00 mg/dL    eGFR 125 ml/min/1 73sq m   Protein / creatinine ratio, urine    Collection Time: 11/02/22  4:03 PM   Result Value Ref Range    Creatinine, Ur 25 9 mg/dL    Protein Urine Random 5 mg/dL    Prot/Creat Ratio, Ur 0 19 (H) 0 00 - 0 10       Lab, Imaging and other studies: I have personally reviewed pertinent reports      Ana Avila  11/2/2022  4:41 PM

## 2022-11-02 NOTE — ASSESSMENT & PLAN NOTE
- Continue PNV  - Labor precautions reviewed  - Fetal kick counts reviewed  - Labs: Reviewed & UTD  - Ultrasounds: Most recent US on 10/11/22 wnl;  No further US scheduled at this tie  - Tdap: Administered 9/9/22  - COVID: J&J x1; Moderna x1  - Flu: Already recieved  - Delivery: Scheduled for 1LTCS due to breech presentation  - Contraception: Likely POPs, reviewed options  - Breastfeeding: Yes  - GBS positive by urine  - RTO in 1 week

## 2022-11-02 NOTE — ASSESSMENT & PLAN NOTE
Elevated BP noted today in the office x2  This is her first elevated blood pressure this pregnancy  No symptoms of PreE at this time  Urine dip in the office was negative for protein  Patient will complete preeclampsia labs outpatient tomorrow  Reviewed symptoms of PreE and patient to monitor

## 2022-11-02 NOTE — TELEPHONE ENCOUNTER
Pt states was in office yesterday  Had her BP checked today by nurse at her school 144/88 and now experiencing CP, R upper chest 6/10 reports sharp has been persistent and ongoing since 1215 with no relief and also c/o SOB  pt states has been SOB last 2 days but today is worse  Also states she feels a little dizzy and lightheaded but states she has been experiencing these symptoms for the past week with no change  states she "thinks has felt baby moving today" and reports lower abd cramps but same as yesterday when seen in office  TT sent to on call provider and pt aware to L&D triage for evaluation  Pt verbalizes understanding and states her mom or  will drive her  L&D made aware

## 2022-11-03 ENCOUNTER — HOSPITAL ENCOUNTER (OUTPATIENT)
Dept: INFUSION CENTER | Facility: CLINIC | Age: 32
Discharge: HOME | End: 2022-11-03

## 2022-11-04 ENCOUNTER — HOSPITAL ENCOUNTER (OUTPATIENT)
Dept: INFUSION CENTER | Facility: CLINIC | Age: 32
End: 2022-11-04

## 2022-11-04 ENCOUNTER — HOSPITAL ENCOUNTER (OUTPATIENT)
Dept: INFUSION CENTER | Facility: CLINIC | Age: 32
Discharge: HOME | End: 2022-11-04

## 2022-11-04 ENCOUNTER — TELEPHONE (OUTPATIENT)
Dept: OBGYN CLINIC | Facility: CLINIC | Age: 32
End: 2022-11-04

## 2022-11-04 VITALS
HEART RATE: 107 BPM | DIASTOLIC BLOOD PRESSURE: 88 MMHG | SYSTOLIC BLOOD PRESSURE: 134 MMHG | OXYGEN SATURATION: 95 % | RESPIRATION RATE: 18 BRPM | TEMPERATURE: 97.9 F

## 2022-11-04 DIAGNOSIS — O99.013 ANEMIA DURING PREGNANCY IN THIRD TRIMESTER: Primary | ICD-10-CM

## 2022-11-04 RX ORDER — SODIUM CHLORIDE 9 MG/ML
20 INJECTION, SOLUTION INTRAVENOUS ONCE
Status: COMPLETED | OUTPATIENT
Start: 2022-11-04 | End: 2022-11-04

## 2022-11-04 RX ORDER — SODIUM CHLORIDE 9 MG/ML
20 INJECTION, SOLUTION INTRAVENOUS ONCE
Status: CANCELLED | OUTPATIENT
Start: 2022-11-11

## 2022-11-04 RX ADMIN — SODIUM CHLORIDE 200 MG: 9 INJECTION, SOLUTION INTRAVENOUS at 15:20

## 2022-11-04 RX ADMIN — SODIUM CHLORIDE 20 ML/HR: 9 INJECTION, SOLUTION INTRAVENOUS at 15:19

## 2022-11-04 NOTE — TELEPHONE ENCOUNTER
Stanley Hennessy RN at [facility] reaching out on behalf of pt  On arrival to infusion center, pt's /84 , rested 10 min and recheck /88   Reports dizziness and sob per pt that has been ongoing for 1 week  Pt recently evaluated in L&D 11/2 for symptoms  TT sent to provider on call to review if okay to treat with IV infusion as ordered and scheduled  Stanley Hennessy RN made aware "okay to treat today" per Dr Gerber Angry

## 2022-11-04 NOTE — PROGRESS NOTES
Patient presents to infusion department for venofer infusion  Patient offers complaints of dizziness and shortness of breath, BP's in the 130's  Spoke with OB nurse who spoke with provider on call, ok to treat per Dr Jeremias Medina   Patient tolerated venofer infusion well  AVS declined, next appt reviewed

## 2022-11-07 ENCOUNTER — PATIENT MESSAGE (OUTPATIENT)
Dept: OBGYN CLINIC | Facility: CLINIC | Age: 32
End: 2022-11-07

## 2022-11-11 ENCOUNTER — ROUTINE PRENATAL (OUTPATIENT)
Dept: OBGYN CLINIC | Facility: CLINIC | Age: 32
End: 2022-11-11

## 2022-11-11 VITALS
DIASTOLIC BLOOD PRESSURE: 86 MMHG | HEIGHT: 69 IN | SYSTOLIC BLOOD PRESSURE: 128 MMHG | BODY MASS INDEX: 33.03 KG/M2 | HEART RATE: 106 BPM | WEIGHT: 223 LBS

## 2022-11-11 DIAGNOSIS — O31.10X0 VANISHING TWIN SYNDROME: ICD-10-CM

## 2022-11-11 DIAGNOSIS — Z98.890 HISTORY OF BACK SURGERY: ICD-10-CM

## 2022-11-11 DIAGNOSIS — B97.7 HIGH RISK HPV INFECTION: ICD-10-CM

## 2022-11-11 DIAGNOSIS — O28.0 ABNORMAL MSAFP (MATERNAL SERUM ALPHA-FETOPROTEIN), ELEVATED: ICD-10-CM

## 2022-11-11 DIAGNOSIS — Z3A.37 37 WEEKS GESTATION OF PREGNANCY: Primary | ICD-10-CM

## 2022-11-11 NOTE — PROGRESS NOTES
nst 130's moderate variability no decelerations and present accelerations, toco irritability, Labor Talk reviewed signs sxs labor planned c/s for breech still breech by u/s today, already has wash and booklet, ft/60/-1 soft post, negative pool negative nitrazine, wetness of underwear, Patient reports good fm, no n/v, headache,, bleeding,, dom violence, or smoking    tonja pnv counseled on c/s return in 1 week or sooner as needed

## 2022-11-13 ENCOUNTER — NURSE TRIAGE (OUTPATIENT)
Dept: OTHER | Facility: OTHER | Age: 32
End: 2022-11-13

## 2022-11-13 ENCOUNTER — HOSPITAL ENCOUNTER (INPATIENT)
Facility: HOSPITAL | Age: 32
LOS: 3 days | Discharge: HOME | End: 2022-11-17
Attending: STUDENT IN AN ORGANIZED HEALTH CARE EDUCATION/TRAINING PROGRAM | Admitting: STUDENT IN AN ORGANIZED HEALTH CARE EDUCATION/TRAINING PROGRAM

## 2022-11-13 DIAGNOSIS — O36.8190 DECREASED FETAL MOVEMENT AFFECTING MANAGEMENT OF PREGNANCY, ANTEPARTUM, SINGLE OR UNSPECIFIED FETUS: ICD-10-CM

## 2022-11-13 DIAGNOSIS — O31.10X0 VANISHING TWIN SYNDROME: ICD-10-CM

## 2022-11-13 DIAGNOSIS — Z3A.37 37 WEEKS GESTATION OF PREGNANCY: ICD-10-CM

## 2022-11-13 DIAGNOSIS — O36.8190 DECREASED FETAL MOVEMENT: Primary | ICD-10-CM

## 2022-11-13 DIAGNOSIS — Z98.891 S/P CESAREAN SECTION: ICD-10-CM

## 2022-11-14 ENCOUNTER — ANESTHESIA EVENT (INPATIENT)
Dept: LABOR AND DELIVERY | Facility: HOSPITAL | Age: 32
End: 2022-11-14

## 2022-11-14 ENCOUNTER — ANESTHESIA (INPATIENT)
Dept: LABOR AND DELIVERY | Facility: HOSPITAL | Age: 32
End: 2022-11-14

## 2022-11-14 PROBLEM — O36.8190 DECREASED FETAL MOVEMENT: Status: ACTIVE | Noted: 2022-11-14

## 2022-11-14 PROBLEM — Z3A.37 37 WEEKS GESTATION OF PREGNANCY: Status: ACTIVE | Noted: 2022-09-09

## 2022-11-14 LAB
ABO GROUP BLD: NORMAL
ALBUMIN SERPL BCP-MCNC: 3.6 G/DL (ref 3.5–5)
ALP SERPL-CCNC: 88 U/L (ref 34–104)
ALT SERPL W P-5'-P-CCNC: 10 U/L (ref 7–52)
ANION GAP SERPL CALCULATED.3IONS-SCNC: 10 MMOL/L (ref 4–13)
AST SERPL W P-5'-P-CCNC: 14 U/L (ref 13–39)
BASE EXCESS BLDCOA CALC-SCNC: -5.7 MMOL/L (ref 3–11)
BASE EXCESS BLDCOV CALC-SCNC: -3.9 MMOL/L (ref 1–9)
BILIRUB SERPL-MCNC: 0.25 MG/DL (ref 0.2–1)
BLD GP AB SCN SERPL QL: NEGATIVE
BUN SERPL-MCNC: 9 MG/DL (ref 5–25)
CALCIUM SERPL-MCNC: 9 MG/DL (ref 8.4–10.2)
CHLORIDE SERPL-SCNC: 106 MMOL/L (ref 96–108)
CO2 SERPL-SCNC: 20 MMOL/L (ref 21–32)
CREAT SERPL-MCNC: 0.55 MG/DL (ref 0.6–1.3)
CREAT UR-MCNC: 39.6 MG/DL
ERYTHROCYTE [DISTWIDTH] IN BLOOD BY AUTOMATED COUNT: 14.5 % (ref 11.6–15.1)
EXTERNAL GROUP B STREP ANTIGEN: POSITIVE
GFR SERPL CREATININE-BSD FRML MDRD: 126 ML/MIN/1.73SQ M
GLUCOSE SERPL-MCNC: 76 MG/DL (ref 65–140)
HCO3 BLDCOA-SCNC: 21.5 MMOL/L (ref 17.3–27.3)
HCO3 BLDCOV-SCNC: 23.9 MMOL/L (ref 12.2–28.6)
HCT VFR BLD AUTO: 35 % (ref 34.8–46.1)
HGB BLD-MCNC: 11.4 G/DL (ref 11.5–15.4)
MCH RBC QN AUTO: 29.3 PG (ref 26.8–34.3)
MCHC RBC AUTO-ENTMCNC: 32.6 G/DL (ref 31.4–37.4)
MCV RBC AUTO: 90 FL (ref 82–98)
O2 CT VFR BLDCOA CALC: 10.6 ML/DL
OXYHGB MFR BLDCOA: 42 %
OXYHGB MFR BLDCOV: 34.3 %
PCO2 BLDCOA: 48.2 MM[HG] (ref 30–60)
PCO2 BLDCOV: 53 MM HG (ref 27–43)
PH BLDCOA: 7.27 [PH] (ref 7.23–7.43)
PH BLDCOV: 7.27 [PH] (ref 7.19–7.49)
PLATELET # BLD AUTO: 178 THOUSANDS/UL (ref 149–390)
PMV BLD AUTO: 11.9 FL (ref 8.9–12.7)
PO2 BLDCOA: 20.4 MM HG (ref 5–25)
PO2 BLDCOV: 17.8 MM HG (ref 15–45)
POTASSIUM SERPL-SCNC: 3.9 MMOL/L (ref 3.5–5.3)
PROT SERPL-MCNC: 6.8 G/DL (ref 6.4–8.4)
PROT UR-MCNC: 7 MG/DL
PROT/CREAT UR: 0.18 MG/G{CREAT} (ref 0–0.1)
RBC # BLD AUTO: 3.89 MILLION/UL (ref 3.81–5.12)
RH BLD: POSITIVE
RPR SER QL: NORMAL
SAO2 % BLDCOV: 9 ML/DL
SODIUM SERPL-SCNC: 136 MMOL/L (ref 135–147)
SPECIMEN EXPIRATION DATE: NORMAL
WBC # BLD AUTO: 7.75 THOUSAND/UL (ref 4.31–10.16)

## 2022-11-14 RX ORDER — OXYTOCIN/RINGER'S LACTATE 30/500 ML
62.5 PLASTIC BAG, INJECTION (ML) INTRAVENOUS CONTINUOUS
Status: DISPENSED | OUTPATIENT
Start: 2022-11-14 | End: 2022-11-15

## 2022-11-14 RX ORDER — CEFAZOLIN SODIUM 2 G/50ML
2000 SOLUTION INTRAVENOUS ONCE
Status: DISCONTINUED | OUTPATIENT
Start: 2022-11-14 | End: 2022-11-14

## 2022-11-14 RX ORDER — NALOXONE HYDROCHLORIDE 0.4 MG/ML
0.1 INJECTION, SOLUTION INTRAMUSCULAR; INTRAVENOUS; SUBCUTANEOUS
Status: ACTIVE | OUTPATIENT
Start: 2022-11-14 | End: 2022-11-15

## 2022-11-14 RX ORDER — KETOROLAC TROMETHAMINE 30 MG/ML
15 INJECTION, SOLUTION INTRAMUSCULAR; INTRAVENOUS EVERY 6 HOURS
Status: COMPLETED | OUTPATIENT
Start: 2022-11-14 | End: 2022-11-15

## 2022-11-14 RX ORDER — METOCLOPRAMIDE HYDROCHLORIDE 5 MG/ML
INJECTION INTRAMUSCULAR; INTRAVENOUS AS NEEDED
Status: DISCONTINUED | OUTPATIENT
Start: 2022-11-14 | End: 2022-11-14

## 2022-11-14 RX ORDER — OXYCODONE HYDROCHLORIDE 5 MG/1
5 TABLET ORAL EVERY 4 HOURS PRN
Status: DISCONTINUED | OUTPATIENT
Start: 2022-11-15 | End: 2022-11-17 | Stop reason: HOSPADM

## 2022-11-14 RX ORDER — SODIUM CHLORIDE, SODIUM LACTATE, POTASSIUM CHLORIDE, CALCIUM CHLORIDE 600; 310; 30; 20 MG/100ML; MG/100ML; MG/100ML; MG/100ML
125 INJECTION, SOLUTION INTRAVENOUS CONTINUOUS
Status: CANCELLED | OUTPATIENT
Start: 2022-11-14

## 2022-11-14 RX ORDER — PROMETHAZINE HYDROCHLORIDE 25 MG/ML
25 INJECTION, SOLUTION INTRAMUSCULAR; INTRAVENOUS ONCE AS NEEDED
Status: DISCONTINUED | OUTPATIENT
Start: 2022-11-14 | End: 2022-11-17 | Stop reason: HOSPADM

## 2022-11-14 RX ORDER — ONDANSETRON 2 MG/ML
4 INJECTION INTRAMUSCULAR; INTRAVENOUS EVERY 4 HOURS PRN
Status: DISCONTINUED | OUTPATIENT
Start: 2022-11-14 | End: 2022-11-14

## 2022-11-14 RX ORDER — KETOROLAC TROMETHAMINE 30 MG/ML
INJECTION, SOLUTION INTRAMUSCULAR; INTRAVENOUS AS NEEDED
Status: DISCONTINUED | OUTPATIENT
Start: 2022-11-14 | End: 2022-11-14

## 2022-11-14 RX ORDER — ONDANSETRON 2 MG/ML
4 INJECTION INTRAMUSCULAR; INTRAVENOUS EVERY 8 HOURS PRN
Status: DISCONTINUED | OUTPATIENT
Start: 2022-11-14 | End: 2022-11-17 | Stop reason: HOSPADM

## 2022-11-14 RX ORDER — ONDANSETRON 2 MG/ML
INJECTION INTRAMUSCULAR; INTRAVENOUS AS NEEDED
Status: DISCONTINUED | OUTPATIENT
Start: 2022-11-14 | End: 2022-11-14

## 2022-11-14 RX ORDER — HYDROMORPHONE HCL/PF 1 MG/ML
0.2 SYRINGE (ML) INJECTION
Status: DISCONTINUED | OUTPATIENT
Start: 2022-11-14 | End: 2022-11-17 | Stop reason: HOSPADM

## 2022-11-14 RX ORDER — OXYTOCIN/RINGER'S LACTATE 30/500 ML
62.5 PLASTIC BAG, INJECTION (ML) INTRAVENOUS ONCE
Status: COMPLETED | OUTPATIENT
Start: 2022-11-14 | End: 2022-11-14

## 2022-11-14 RX ORDER — MEPERIDINE HYDROCHLORIDE 25 MG/ML
12.5 INJECTION INTRAMUSCULAR; INTRAVENOUS; SUBCUTANEOUS
Status: DISCONTINUED | OUTPATIENT
Start: 2022-11-14 | End: 2022-11-17 | Stop reason: HOSPADM

## 2022-11-14 RX ORDER — SODIUM CHLORIDE, SODIUM LACTATE, POTASSIUM CHLORIDE, CALCIUM CHLORIDE 600; 310; 30; 20 MG/100ML; MG/100ML; MG/100ML; MG/100ML
125 INJECTION, SOLUTION INTRAVENOUS CONTINUOUS
Status: DISCONTINUED | OUTPATIENT
Start: 2022-11-14 | End: 2022-11-17 | Stop reason: HOSPADM

## 2022-11-14 RX ORDER — MORPHINE SULFATE 0.5 MG/ML
INJECTION, SOLUTION EPIDURAL; INTRATHECAL; INTRAVENOUS AS NEEDED
Status: DISCONTINUED | OUTPATIENT
Start: 2022-11-14 | End: 2022-11-14

## 2022-11-14 RX ORDER — SERTRALINE HYDROCHLORIDE 25 MG/1
25 TABLET, FILM COATED ORAL DAILY
Status: DISCONTINUED | OUTPATIENT
Start: 2022-11-14 | End: 2022-11-17 | Stop reason: HOSPADM

## 2022-11-14 RX ORDER — ACETAMINOPHEN 325 MG/1
650 TABLET ORAL EVERY 6 HOURS SCHEDULED
Status: DISPENSED | OUTPATIENT
Start: 2022-11-14 | End: 2022-11-15

## 2022-11-14 RX ORDER — CEFAZOLIN SODIUM 2 G/50ML
SOLUTION INTRAVENOUS AS NEEDED
Status: DISCONTINUED | OUTPATIENT
Start: 2022-11-14 | End: 2022-11-14

## 2022-11-14 RX ORDER — OXYCODONE HYDROCHLORIDE 10 MG/1
10 TABLET ORAL EVERY 4 HOURS PRN
Status: DISCONTINUED | OUTPATIENT
Start: 2022-11-15 | End: 2022-11-17 | Stop reason: HOSPADM

## 2022-11-14 RX ORDER — BUPIVACAINE HYDROCHLORIDE 7.5 MG/ML
INJECTION, SOLUTION INTRASPINAL AS NEEDED
Status: DISCONTINUED | OUTPATIENT
Start: 2022-11-14 | End: 2022-11-14

## 2022-11-14 RX ORDER — DEXAMETHASONE SODIUM PHOSPHATE 10 MG/ML
INJECTION, SOLUTION INTRAMUSCULAR; INTRAVENOUS AS NEEDED
Status: DISCONTINUED | OUTPATIENT
Start: 2022-11-14 | End: 2022-11-14

## 2022-11-14 RX ORDER — CALCIUM CARBONATE 200(500)MG
1000 TABLET,CHEWABLE ORAL DAILY PRN
Status: DISCONTINUED | OUTPATIENT
Start: 2022-11-14 | End: 2022-11-17 | Stop reason: HOSPADM

## 2022-11-14 RX ORDER — ONDANSETRON 2 MG/ML
4 INJECTION INTRAMUSCULAR; INTRAVENOUS EVERY 8 HOURS PRN
Status: DISCONTINUED | OUTPATIENT
Start: 2022-11-14 | End: 2022-11-14

## 2022-11-14 RX ORDER — ENOXAPARIN SODIUM 100 MG/ML
40 INJECTION SUBCUTANEOUS DAILY
Status: DISCONTINUED | OUTPATIENT
Start: 2022-11-15 | End: 2022-11-17 | Stop reason: HOSPADM

## 2022-11-14 RX ORDER — ACETAMINOPHEN 325 MG/1
650 TABLET ORAL EVERY 6 HOURS SCHEDULED
Status: DISCONTINUED | OUTPATIENT
Start: 2022-11-15 | End: 2022-11-17 | Stop reason: HOSPADM

## 2022-11-14 RX ORDER — OXYTOCIN/RINGER'S LACTATE 30/500 ML
PLASTIC BAG, INJECTION (ML) INTRAVENOUS CONTINUOUS PRN
Status: DISCONTINUED | OUTPATIENT
Start: 2022-11-14 | End: 2022-11-14

## 2022-11-14 RX ORDER — SIMETHICONE 80 MG
80 TABLET,CHEWABLE ORAL 4 TIMES DAILY PRN
Status: DISCONTINUED | OUTPATIENT
Start: 2022-11-14 | End: 2022-11-17 | Stop reason: HOSPADM

## 2022-11-14 RX ORDER — HYDROMORPHONE HCL/PF 1 MG/ML
0.5 SYRINGE (ML) INJECTION EVERY 2 HOUR PRN
Status: DISCONTINUED | OUTPATIENT
Start: 2022-11-14 | End: 2022-11-17 | Stop reason: HOSPADM

## 2022-11-14 RX ORDER — DOCUSATE SODIUM 100 MG/1
100 CAPSULE, LIQUID FILLED ORAL 2 TIMES DAILY
Status: DISCONTINUED | OUTPATIENT
Start: 2022-11-14 | End: 2022-11-17 | Stop reason: HOSPADM

## 2022-11-14 RX ORDER — FENTANYL CITRATE/PF 50 MCG/ML
50 SYRINGE (ML) INJECTION
Status: DISCONTINUED | OUTPATIENT
Start: 2022-11-14 | End: 2022-11-17 | Stop reason: HOSPADM

## 2022-11-14 RX ADMIN — KETOROLAC TROMETHAMINE 15 MG: 30 INJECTION, SOLUTION INTRAMUSCULAR at 19:46

## 2022-11-14 RX ADMIN — Medication 250 MILLI-UNITS/MIN: at 13:48

## 2022-11-14 RX ADMIN — METOCLOPRAMIDE 5 MG: 5 INJECTION, SOLUTION INTRAMUSCULAR; INTRAVENOUS at 13:58

## 2022-11-14 RX ADMIN — MORPHINE SULFATE 2.3 MG: 0.5 INJECTION, SOLUTION EPIDURAL; INTRATHECAL; INTRAVENOUS at 14:10

## 2022-11-14 RX ADMIN — SODIUM CHLORIDE, SODIUM LACTATE, POTASSIUM CHLORIDE, AND CALCIUM CHLORIDE 125 ML/HR: .6; .31; .03; .02 INJECTION, SOLUTION INTRAVENOUS at 17:35

## 2022-11-14 RX ADMIN — Medication 62.5 MILLI-UNITS/MIN: at 19:26

## 2022-11-14 RX ADMIN — BUPIVACAINE HYDROCHLORIDE IN DEXTROSE 1.8 ML: 7.5 INJECTION, SOLUTION SUBARACHNOID at 13:23

## 2022-11-14 RX ADMIN — PHENYLEPHRINE HYDROCHLORIDE 30 MCG/MIN: 50 INJECTION INTRAVENOUS at 13:26

## 2022-11-14 RX ADMIN — Medication 62.5 MILLI-UNITS/MIN: at 15:47

## 2022-11-14 RX ADMIN — KETOROLAC TROMETHAMINE 30 MG: 30 INJECTION, SOLUTION INTRAMUSCULAR at 14:15

## 2022-11-14 RX ADMIN — CEFAZOLIN SODIUM 2000 MG: 2 SOLUTION INTRAVENOUS at 13:22

## 2022-11-14 RX ADMIN — MORPHINE SULFATE 2.5 MG: 0.5 INJECTION, SOLUTION EPIDURAL; INTRATHECAL; INTRAVENOUS at 14:04

## 2022-11-14 RX ADMIN — METOCLOPRAMIDE 5 MG: 5 INJECTION, SOLUTION INTRAMUSCULAR; INTRAVENOUS at 14:00

## 2022-11-14 RX ADMIN — DEXAMETHASONE SODIUM PHOSPHATE 10 MG: 10 INJECTION, SOLUTION INTRAMUSCULAR; INTRAVENOUS at 13:25

## 2022-11-14 RX ADMIN — DOCUSATE SODIUM 100 MG: 100 CAPSULE, LIQUID FILLED ORAL at 17:34

## 2022-11-14 RX ADMIN — FENTANYL CITRATE 50 MCG: 50 INJECTION INTRAMUSCULAR; INTRAVENOUS at 15:47

## 2022-11-14 RX ADMIN — ONDANSETRON 4 MG: 2 INJECTION INTRAMUSCULAR; INTRAVENOUS at 13:20

## 2022-11-14 RX ADMIN — SODIUM CHLORIDE, SODIUM LACTATE, POTASSIUM CHLORIDE, AND CALCIUM CHLORIDE 125 ML/HR: .6; .31; .03; .02 INJECTION, SOLUTION INTRAVENOUS at 10:38

## 2022-11-14 RX ADMIN — MORPHINE SULFATE 0.2 MG: 0.5 INJECTION, SOLUTION EPIDURAL; INTRATHECAL; INTRAVENOUS at 13:23

## 2022-11-14 RX ADMIN — ACETAMINOPHEN 650 MG: 325 TABLET ORAL at 17:34

## 2022-11-14 NOTE — ANESTHESIA PREPROCEDURE EVALUATION
Procedure:   SECTION () (N/A Uterus)    Relevant Problems   GYN   (+) 37 weeks gestation of pregnancy      HEMATOLOGY   (+) Anemia during pregnancy in third trimester      NEURO/PSYCH   (+) Anxiety   (+) OCD (obsessive compulsive disorder)        Physical Exam    Airway    Mallampati score: II  TM Distance: >3 FB  Neck ROM: full     Dental   No notable dental hx     Cardiovascular      Pulmonary      Other Findings        Anesthesia Plan  ASA Score- 2     Anesthesia Type- spinal with ASA Monitors  Additional Monitors:   Airway Plan:           Plan Factors-Exercise tolerance (METS): >4 METS  Chart reviewed  Existing labs reviewed  Patient summary reviewed  Patient is not a current smoker  Induction-     Postoperative Plan- Plan for postoperative opioid use  Informed Consent- Anesthetic plan and risks discussed with patient  I personally reviewed this patient with the CRNA  Discussed and agreed on the Anesthesia Plan with the CRNA  Mayito Delgado

## 2022-11-14 NOTE — QUICK NOTE
After discussion with Dr Jefry Charles and Dr Coty Booker, plan to proceed with  section today for breech presentation, patient declined an ECV  We discussed expectations with spinal and delivery  Anesthesia and charge nurse have been notified  Patient to remain NPO and preoperative antibiotics have been ordered      Yoselin De Jesus MD  PGY-3 OB/GYN   2022 10:56 AM

## 2022-11-14 NOTE — ANESTHESIA POSTPROCEDURE EVALUATION
Post-Op Assessment Note    CV Status:  Stable  Pain Score: 0    Pain management: adequate     Mental Status:  Alert   Hydration Status:  Stable and euvolemic   PONV Controlled:  None   Airway Patency:  Patent      Post Op Vitals Reviewed: Yes      Staff: CRNA         No complications documented      BP   105/56   Temp 97 8   Pulse 83   Resp 18   SpO2 99

## 2022-11-14 NOTE — DISCHARGE SUMMARY
Discharge Summary - Quincy Nagel 29 y o  female MRN: [id number]    Unit/Bed#: LD PACU-01 Encounter: [id number]    Admission Date: 2022     Discharge Date: 22    Admitting Attending: Kirk Peters  Delivering Attending: Sanjuana Lawson  Discharge Attending: Maryjo Reed    Diagnosis:  40 week pregnancy  Breech presentation  cHTN  Decreased fetal movement    Procedures: Primary low transverse  section     Complications: none apparent     Pt is a 28 yo  who was admitted for 1LTCS for breech presentation and cHTN, with normal PEC labs, at 37w4d  She underwent an uncomplicated  delivery  She delivered a viable female  at 65 on 22  Weight was 6lbs 10 4oz (3015g) with APGARs of 8 and 9 at 1 and 5 minutes  Her preoperative Hb was 11 4  Her postoperative Hb was 9 3  Her postoperative course was uncomplicated  Condition at discharge: good     On day of discharge pain was well controlled, patient was tolerating PO, passing flatus  She was discharged with standard post partum/ post operative instructions to follow up with her physician in 1 week for an incision check and in 3-6 weeks for a postpartum appointment  Discharge instructions/Information to patient and family:   -Do not place anything (no partner, tampons or douche) in your vagina for 6 weeks  -You may walk for exercise for the first 6 weeks then gradually return to your usual activities    -Please do not drive for 1 week if you have no stitches and for 2 weeks if you have stitches or underwent a  delivery     -You may take baths or shower per your preference    -Please look at your bust (breasts) in the mirror daily and call for redness or tenderness or increased warmth    -Please call us for temperature > 100 4*F or 38* C, worsening pain or a foul discharge       Discharge Medications:   Prenatal vitamin daily for 6 months or the duration of nursing whichever is longer    Motrin 600 mg orally every 6 hours as needed for pain  Tylenol (over the counter) per bottle directions as needed for pain: do NOT use with percocet  Hydrocortisone cream 1% (over the counter) applied 1-2x daily to hemorrhoids as needed  Percocet as needed    Provisions for Follow-Up Care: Follow up with your doctor in 1 week for incision site check  Planned Readmission: no    Case and note reviewed agree

## 2022-11-14 NOTE — PLAN OF CARE
Problem: PAIN - ADULT  Goal: Verbalizes/displays adequate comfort level or baseline comfort level  Description: Interventions:  - Encourage patient to monitor pain and request assistance  - Assess pain using appropriate pain scale  - Administer analgesics based on type and severity of pain and evaluate response  - Implement non-pharmacological measures as appropriate and evaluate response  - Consider cultural and social influences on pain and pain management  - Notify physician/advanced practitioner if interventions unsuccessful or patient reports new pain  Outcome: Progressing     Problem: INFECTION - ADULT  Goal: Absence or prevention of progression during hospitalization  Description: INTERVENTIONS:  - Assess and monitor for signs and symptoms of infection  - Monitor lab/diagnostic results  - Monitor all insertion sites, i e  indwelling lines, tubes, and drains  - Monitor endotracheal if appropriate and nasal secretions for changes in amount and color  - [place] appropriate cooling/warming therapies per order  - Administer medications as ordered  - Instruct and encourage patient and family to use good hand hygiene technique  - Identify and instruct in appropriate isolation precautions for identified infection/condition  Outcome: Progressing     Problem: SAFETY ADULT  Goal: Patient will remain free of falls  Description: INTERVENTIONS:  - Educate patient/family on patient safety including physical limitations  - Instruct patient to call for assistance with activity   - Consult OT/PT to assist with strengthening/mobility   - Keep Call bell within reach  - Keep bed low and locked with side rails adjusted as appropriate  - Keep care items and personal belongings within reach  - Initiate and maintain comfort rounds  - Make Fall Risk Sign visible to staff  - Apply yellow socks and bracelet for high fall risk patients  - Consider moving patient to room near nurses station  Outcome: Progressing  Goal: Maintain or return to baseline ADL function  Description: INTERVENTIONS:  -  Assess patient's ability to carry out ADLs; assess patient's baseline for ADL function and identify physical deficits which impact ability to perform ADLs (bathing, care of mouth/teeth, toileting, grooming, dressing, etc )  - Assess/evaluate cause of self-care deficits   - Assess range of motion  - Assess patient's mobility; develop plan if impaired  - Assess patient's need for assistive devices and provide as appropriate  - Encourage maximum independence but intervene and supervise when necessary  - Involve family in performance of ADLs  - Assess for home care needs following discharge   - Consider OT consult to assist with ADL evaluation and planning for discharge  - Provide patient education as appropriate  Outcome: Progressing  Goal: Maintains/Returns to pre admission functional level  Description: INTERVENTIONS:  - Perform BMAT or MOVE assessment daily    - Set and communicate daily mobility goal to care team and patient/family/caregiver  - Collaborate with rehabilitation services on mobility goals if consulted  - Out of bed for toileting  - Record patient progress and toleration of activity level   Outcome: Progressing     Problem: Knowledge Deficit  Goal: Patient/family/caregiver demonstrates understanding of disease process, treatment plan, medications, and discharge instructions  Description: Complete learning assessment and assess knowledge base    Interventions:  - Provide teaching at level of understanding  - Provide teaching via preferred learning methods  Outcome: Progressing     Problem: DISCHARGE PLANNING  Goal: Discharge to home or other facility with appropriate resources  Description: INTERVENTIONS:  - Identify barriers to discharge w/patient and caregiver  - Arrange for needed discharge resources and transportation as appropriate  - Identify discharge learning needs (meds, wound care, etc )  - Arrange for interpretive services to assist at discharge as needed  - Refer to Case Management Department for coordinating discharge planning if the patient needs post-hospital services based on physician/advanced practitioner order or complex needs related to functional status, cognitive ability, or social support system  Outcome: Progressing     Problem: POSTPARTUM  Goal: Experiences normal postpartum course  Description: INTERVENTIONS:  - Monitor maternal vital signs  - Assess uterine involution and lochia  Outcome: Progressing  Goal: Appropriate maternal -  bonding  Description: INTERVENTIONS:  - Identify family support  - Assess for appropriate maternal/infant bonding   -Encourage maternal/infant bonding opportunities  - Referral to  or  as needed  Outcome: Progressing  Goal: Establishment of infant feeding pattern  Description: INTERVENTIONS:  - Assess breast/bottle feeding  - Refer to lactation as needed  Outcome: Progressing  Goal: Incision(s), wounds(s) or drain site(s) healing without S/S of infection  Description: INTERVENTIONS  - Assess and document dressing, incision, wound bed, drain sites and surrounding tissue  - Provide patient and family education  Outcome: Progressing

## 2022-11-14 NOTE — CONSULTS
Consultation - Maternal Fetal Medicine   Juliette Nagel 29 y o  female MRN: [id number]  Unit/Bed#: -01 Encounter: [id number]  Admit date: 2022  Today's date: 22    Assessment/Plan   Ms Nagel is a 29y o  year-old  at [facility] 72 Day: 2, admitted for observation in the setting of decreased fetal movement  By issue:    * 37 weeks gestation of pregnancy  Assessment & Plan  - NST TID and prn  - ALESSANDRO normal, 10 64cm on admission  - Complete BPP and formal US this morning  - Discussed with the patient that we will likely proceed with delivery today with  section in setting of breech presentation  - F/u US results         Chief Complaint   Patient presents with   • Decreased Fetal Movement     Started today        Physician Requesting Consult: Nettie Pallas, MD  Reason for Consult / Principal Problem: decreased fetal movement  Subspeciality: Perinatology    Thank you for referring patient Christin Shrestha for Maternal-Fetal Medicine consultation regarding decreased fetal movement  HPI: As you know, Ms Nagel is a 29y o  year-old  with an MIGUEL of Estimated Date of Delivery: 22 at 37w4d, Hospital Day: 2, admitted for observation in the setting of decreased fetal movement  She has previously had normal movement throughout her pregnancy and yesterday was not feeling fetal movement  She felt some minimal movement overnight and has not been able to feel 10 movements in 2 hours  She denies any vaginal bleeding, leaking of fluid or contractions  Her current pregnancy is significant for vanishing twin, elevated MSAFP, breech presentation and one elevated BP in the pregnancy thus far  Other obstetric review of symptoms:  Contractions: None  Leakage of fluid: None  Bleeding: None      Fetal movement: decreased    Other history is as follows:    Historical Information   OB History    Para Term  AB Living   2       1     SAB IAB Ectopic Multiple Live Births   1              # Outcome Date GA Lbr Asher/2nd Weight Sex Delivery Anes PTL Lv   2 Current            1 SAB 21 11w0d             Obstetric Comments   : 2021 SAB 11w naturally; suction D&E for retained POCs    vanishing twin; not immune Hep B and rubella    CF/SMA neg     Gynecologic history: Patient's last menstrual period was 2022  Past Medical History:   Diagnosis Date   • Abnormal weight gain     last assessed: 2016   • Anxiety     last assessed: 2016   • Depression    • High risk HPV infection     (+) type 16 (-) type 18 (-) other HRHPV   • Miscarriage      Past Surgical History:   Procedure Laterality Date   • BACK SURGERY      discectomy L5-S1; last assessed: 2015   • DILATION AND EVACUATION  2021    retained POCs s/p SAB   • LUMBAR DISCECTOMY      [facility] @ L5/S1   • NE COLONOSCOPY FLX DX W/COLLJ SPEC WHEN PFRMD N/A 2016    Procedure: COLONOSCOPY;  Surgeon: Jose Mitchell MD;  Location: [facility]; Service: Gastroenterology     Social History   Social History     Substance and Sexual Activity   Alcohol Use Not Currently    Comment: socially (does not drink alcohol-as per [employer])     Social History     Substance and Sexual Activity   Drug Use No     Social History     Tobacco Use   Smoking Status Never Smoker   Smokeless Tobacco Never Used     Meds/Allergies   Prior to Admission Medications   Prescriptions Last Dose Informant Patient Reported? Taking? Docosahexaenoic Acid (DHA PO) Past Week at Unknown time  Yes Yes   Sig: Take by mouth   Prenatal Vit-Fe Fumarate-FA (PRENATAL 19 PO) 2022 at Unknown time Self Yes Yes   Sig: Take by mouth   sertraline (ZOLOFT) 25 mg tablet 2022 at Unknown time Self Yes Yes   Sig: Take 25 mg by mouth in the morning        Facility-Administered Medications: None       Current Facility-Administered Medications   Medication Dose Route Frequency   • ondansetron (ZOFRAN) injection 4 mg  4 mg Intravenous Q4H PRN     Allergies   Allergen Reactions   • Hyoscyamine Hallucinations     Patient states she had hallucinations  • Amoxicillin Rash     Other reaction(s): AMOXICILLIN TRIHYDRATE (AMOXICILLIN)   • Gluten Meal - Food Allergy GI Intolerance       Objective    Patient Vitals for the past 24 hrs:   BP Temp Temp src Pulse Resp SpO2 Height Weight   11/14/22 0837 151/92 97 9 °F (36 6 °C) Oral 102 18 99 % -- --   11/14/22 0041 126/82 98 °F (36 7 °C) Oral 90 18 -- -- --   11/13/22 2116 126/83 98 3 °F (36 8 °C) Oral (!) 107 18 97 % 5' 9" (1 753 m) 101 kg (223 lb)     Vitals: Blood pressure 151/92, pulse 102, temperature 97 9 °F (36 6 °C), temperature source Oral, resp  rate 18, height 5' 9" (1 753 m), weight 101 kg (223 lb), last menstrual period 02/24/2022, SpO2 99 %  Body mass index is 32 93 kg/m²  Physical Exam  Constitutional:       Appearance: Normal appearance  HENT:      Head: Normocephalic and atraumatic  Eyes:      Extraocular Movements: Extraocular movements intact  Cardiovascular:      Pulses: Normal pulses  Pulmonary:      Effort: Pulmonary effort is normal    Abdominal:      Comments: Gravid   Musculoskeletal:         General: Normal range of motion  Skin:     General: Skin is warm and dry  Neurological:      Mental Status: She is alert  Mental status is at baseline  Psychiatric:         Mood and Affect: Mood normal          Behavior: Behavior normal                          Invalid input(s): GLU, CA                                Fetal data:  Nonstress test: date 11/14/22 Time: 0613 - 0637  Baseline: 125  Variability: moderate  Accelerations: present, 15x15  Decelerations: absent  Contractions: absent  Assessment: reactive  Plan: continue TID and PRN    MFM ultrasound report key findings: from 10/11 at 32w5d gestational age      RESULTS     Fetus # 1 of 1  Breech presentation  Fetal growth appeared normal  Placenta Location = Anterior  Placenta Grade = II     MEASUREMENTS (* Included In Average GA)     AC              28 6 cm        32 weeks 4 days* (47%)  BPD              7 8 cm        31 weeks 4 days* (12%)  HC              30 1 cm        33 weeks 3 days* (31%)  Femur            6 3 cm        32 weeks 6 days* (40%)     Cerebellum       4 4 cm        34 weeks 1 day     EFW [place] 4   2018 grams - 4 lbs 7 oz                 (39%)     THE AVERAGE GESTATIONAL AGE is 32 weeks 4 days +/- 21 days      AMNIOTIC FLUID     Q1: 2 0      Q2: 1 2      Q3: 2 5      Q4: 5 9  ALESSANDRO Total = 11 5 cm  Amniotic Fluid: Normal             Neel Bronson MD  11/14/2022  8:46 AM

## 2022-11-14 NOTE — PLAN OF CARE
Problem: ANTEPARTUM  Goal: Maintain pregnancy as long as maternal and/or fetal condition is stable  Description: INTERVENTIONS:  - Maternal surveillance  - Fetal surveillance  - Monitor uterine activity  - Medications as ordered  - Bedrest  Outcome: Progressing     Problem: PAIN - ADULT  Goal: Verbalizes/displays adequate comfort level or baseline comfort level  Description: Interventions:  - Encourage patient to monitor pain and request assistance  - Assess pain using appropriate pain scale  - Administer analgesics based on type and severity of pain and evaluate response  - Implement non-pharmacological measures as appropriate and evaluate response  - Consider cultural and social influences on pain and pain management  - Notify physician/advanced practitioner if interventions unsuccessful or patient reports new pain  Outcome: Progressing     Problem: INFECTION - ADULT  Goal: Absence or prevention of progression during hospitalization  Description: INTERVENTIONS:  - Assess and monitor for signs and symptoms of infection  - Monitor lab/diagnostic results  - Monitor all insertion sites, i e  indwelling lines, tubes, and drains  - Monitor endotracheal if appropriate and nasal secretions for changes in amount and color  - [place] appropriate cooling/warming therapies per order  - Administer medications as ordered  - Instruct and encourage patient and family to use good hand hygiene technique  - Identify and instruct in appropriate isolation precautions for identified infection/condition  Outcome: Progressing     Problem: SAFETY ADULT  Goal: Patient will remain free of falls  Description: INTERVENTIONS:  - Educate patient/family on patient safety including physical limitations  - Instruct patient to call for assistance with activity   - Consult OT/PT to assist with strengthening/mobility   - Keep Call bell within reach  - Keep bed low and locked with side rails adjusted as appropriate  - Keep care items and personal belongings within reach  - Initiate and maintain comfort rounds  - Make Fall Risk Sign visible to staff  - Apply yellow socks and bracelet for high fall risk patients  - Consider moving patient to room near nurses station  Outcome: Progressing  Goal: Maintain or return to baseline ADL function  Description: INTERVENTIONS:  -  Assess patient's ability to carry out ADLs; assess patient's baseline for ADL function and identify physical deficits which impact ability to perform ADLs (bathing, care of mouth/teeth, toileting, grooming, dressing, etc )  - Assess/evaluate cause of self-care deficits   - Assess range of motion  - Assess patient's mobility; develop plan if impaired  - Assess patient's need for assistive devices and provide as appropriate  - Encourage maximum independence but intervene and supervise when necessary  - Involve family in performance of ADLs  - Assess for home care needs following discharge   - Consider OT consult to assist with ADL evaluation and planning for discharge  - Provide patient education as appropriate  Outcome: Progressing  Goal: Maintains/Returns to pre admission functional level  Description: INTERVENTIONS:  - Perform BMAT or MOVE assessment daily    - Set and communicate daily mobility goal to care team and patient/family/caregiver  - Collaborate with rehabilitation services on mobility goals if consulted  - Out of bed for toileting  - Record patient progress and toleration of activity level   Outcome: Progressing     Problem: Knowledge Deficit  Goal: Patient/family/caregiver demonstrates understanding of disease process, treatment plan, medications, and discharge instructions  Description: Complete learning assessment and assess knowledge base    Interventions:  - Provide teaching at level of understanding  - Provide teaching via preferred learning methods  Outcome: Progressing     Problem: DISCHARGE PLANNING  Goal: Discharge to home or other facility with appropriate resources  Description: INTERVENTIONS:  - Identify barriers to discharge w/patient and caregiver  - Arrange for needed discharge resources and transportation as appropriate  - Identify discharge learning needs (meds, wound care, etc )  - Arrange for interpretive services to assist at discharge as needed  - Refer to Case Management Department for coordinating discharge planning if the patient needs post-hospital services based on physician/advanced practitioner order or complex needs related to functional status, cognitive ability, or social support system  Outcome: Progressing

## 2022-11-14 NOTE — LACTATION NOTE
This note was copied from a baby's chart  CONSULT - LACTATION  Baby Girl Sean Solorzano) Robe 0 days female MRN: [id number]    [address] Room / Bed: (N)/ 324(N) Encounter: [id number]    Maternal Information     MOTHER:  Juliette Nagel  Maternal Age: 29 y o    OB History: # 1 - Date: 21, Sex: None, Weight: None, GA: 11w0d, Delivery: None, Apgar1: None, Apgar5: None, Living: None, Birth Comments: None    # 2 - Date: 22, Sex: Female, Weight: 3015 g (6 lb 10 4 oz), GA: 37w4d, Delivery: , Low Transverse, Apgar1: 8, Apgar5: 9, Living: Living, Birth Comments: None   Previouse breast reduction surgery? No    Lactation history:   Has patient previously breast fed: No   How long had patient previously breast fed:     Previous breast feeding complications:       Past Surgical History:   Procedure Laterality Date   • BACK SURGERY      discectomy L5-S1; last assessed: 2015   • DILATION AND EVACUATION  2021    retained POCs s/p SAB   • LUMBAR DISCECTOMY      [facility] @ L5/S1   • AZ COLONOSCOPY FLX DX W/COLLJ SPEC WHEN PFRMD N/A 2016    Procedure: COLONOSCOPY;  Surgeon: Sergio Quintero MD;  [facility]: [facility];   Service: Gastroenterology        Birth information:  YOB: 2020   Time of birth: 1:47 PM   Sex: female   Delivery type: , Low Transverse   Birth Weight: 3015 g (6 lb 10 4 oz)   Percent of Weight Change: 0%     Gestational Age: 37w1d   [unfilled]    Assessment     Breast and nipple assessment: normal assessment    Burke Assessment: slight receded chin     Feeding assessment: feeding well  LATCH:  Latch: Repeated attempts, hold nipple in mouth, stimulate to suck   Audible Swallowing: Spontaneous and intermittent (24 hours old)   Type of Nipple: Everted (After stimulation)   Comfort (Breast/Nipple): Soft/non-tender   Hold (Positioning): Partial assist, teach one side, mother does other, staff holds   LATCH Score: 8          Feeding recommendations:  breast feed on demand  Education provided  Demonstration and teach back of hand expression - drops of colostrum on nipple face  Education on bringing baby up to breast level  Latched baby onto the right breast  2-3 active suck bursts  Education on breathing/muscle breaks  Education on non-nutritive suck  Ed  On feeding log, timing of feeds and signs of satiation    RSB/DC and 1st 2 wks handout provided  Mom has an S2 at home    Enc  To call lactation for assistance  Discussed with MOB how to utilize feeding log & monitor baby's output  Education on signs of satiation during a feeding, size of baby's belly, and offering both breasts at every feeding session provided  Education on positioning and alignment  Mom is encouraged to:     - Bring baby up to the breast (use of pillows to elevate so baby's torso is against mom's breasts)   - Skin to skin for feedings with top hand exposed to show signs of satiation   - Chin deep into breast tissue (make baby look up to the nipple)   - nose aligned to the nipple   -Wait for wide gape, drag chin on the breast so nipple is aimed at the upper, back palate  - Cheek should be touching breast   - Deep, firm hold of baby with ear, shoulder, hip alignment    Education and information provided about non-nutritive suck, role of colostrum, and benefits of skin to skin  Information on hand expression given  Discussed benefits of knowing how to manually express breast including stimulating milk supply, softening nipple for latch and evacuating breast in the event of engorgement  Mom is encouraged to place baby skin to skin for feedings  Skin to skin education provided for baby placement on mother's chest, baby only in diaper, blankets below shoulders on baby's back  Skin to skin is encouraged to continue at home for feedings and between feedings      Worked on positioning infant up at chest level and starting to feed infant with nose arriving at the nipple  Then, using areolar compression to achieve a deep latch that is comfortable and exchanges optimum amounts of milk  - Start feedings on breast that last feeding ended   - allow no more than 3 hours between breast feeding sessions   - time between feedings is counted from the beginning of the first feed to the beginning of the next feeding session    Reviewed early signs of hunger, including tensing of hands and shoulders - no need to wait for open eyes  Crying is a late hunger sign  If baby is crying, soothe baby first and then attempt to latch  Reviewed normal sucking patterns: transition from stimulation to nutritive to release or non-nutritive  The goal is to see and hear lots of swallowing  Reviewed normal nursing pattern: infant could latch on one breast up to 30 minutes or until releases on own  Signs of satiation is open hand with fingers that do not grab your finger  Discussed difference in sensation of non-nutritive v nutritive sucking    Met with mother  Provided mother with Ready, Set, Baby booklet  Discussed Skin to Skin contact an benefits to mom and baby  Talked about the delay of the first bath until baby has adjusted  Spoke about the benefits of rooming in  Feeding on cue and what that means for recognizing infant's hunger  Avoidance of pacifiers for the first month discussed  Talked about exclusive breastfeeding for the first 6 months  Positioning and latch reviewed as well as showing images of other feeding positions  Discussed the properties of a good latch in any position  Reviewed hand/manual expression  Discussed s/s that baby is getting enough milk and some s/s that breastfeeding dyad may need further help  Gave information on common concerns, what to expect the first few weeks after delivery, preparing for other caregivers, and how partners can help  Resources for support also provided      Encouraged parents to call for assistance, questions, and concerns about breastfeeding  Extension provided  Provided education on growth spurts, when to introduce bottles; paced bottle feeding, and non-nutritive suck at the breast  Provided education on Signs of satiation  Encouraged to call lactation to observe a latch prior to discharge for reassurance  Encouraged to call baby and me with any questions and closely monitor output            Martha Valles 11/14/2022 5:34 PM

## 2022-11-14 NOTE — UTILIZATION REVIEW
Initial Clinical Review    WAS OBSERVATION 11/14/2022 @ 8873, CONVERTED TO INPATIENT ADMISSION 11/14/2022 @ 1020, DUE TO CONTINUED STAY REQUIRED TO CARE FOR PATIENT WITH    Decreased fetal movement  Admission: Date/Time/Statement:   Admission Orders (From admission, onward)     Ordered        11/14/22 1020  Inpatient Admission  Once            11/14/22 0029  Place in Observation  Once                      Orders Placed This Encounter   Procedures   • Inpatient Admission     Standing Status:   Standing     Number of Occurrences:   1     Order Specific Question:   Level of Care     Answer:   Med Surg [16]     Order Specific Question:   Estimated length of stay     Answer:   More than 2 Midnights     Order Specific Question:   Certification     Answer:   I certify that inpatient services are medically necessary for this patient for a duration of greater than two midnights  See H&P and MD Progress Notes for additional information about the patient's course of treatment  ED Arrival Information     Expected   11/13/2022     Arrival   11/13/2022 20:58    Acuity   -            Means of arrival   Walk-In    Escorted by   Family Member    Service   OB/GYN    Admission type   Emergency            Arrival complaint   [address]           Chief Complaint   Patient presents with   • Decreased Fetal Movement     Started today 11/13       Initial Presentation: 29 y o  female , presented to [facility], Direct Admission to L&D Room, from home via walk in  Admitted as Obervation due to 37 weeks Gestation of Pregnancy  Date: 11/14/2022  29 y o  Duy Steele with an MIGUEL of 12/1/2022, by Last Menstrual Period at 37w4d who is being admitted for observation for decreased fetal movement  She reports since soon she has felt baby move a lot less than usual  She denies having uterine contractions, has no LOF, and reports no VB        Baby complications/comments: elevated MSAFP with no sonographic evidence concerning for spina bifida or open neural tube defect  Early vanishing twin syndrome  Breech presentation- patient scheduled for  section    2022  Consult MFM:   NST TID and prn  ALESSANDRO normal, 10 64cm on admission  Complete BPP and formal US this morning  Discussed with the patient that we will likely proceed with delivery today with  section in setting of breech presentation  F/u US results  SURGERY DATE: 2022  Procedure(s) (LRB):   SECTION () (N/A)  Anesthesia Type: Spinal  Operative Findings:  1  Viable female  at 65 with APGARs of 8 and 9 at 1 and 5 minutes  Fetus weighted 6lb 10 4 oz   2  Normal intact placenta with centrally inserted 3VC  3  Normal uterus, bilateral tubes and ovaries  Day 2: 11/15/2022   Post partum Day #1 s/p 1LTCS for breech, stable, baby in room  QBL: 604 cc, Hgb: 11 4--> 9 3  Metcalf removed, voided once, pending rest of void trial   Continue routine post partum care  Encourage ambulation  Encourage breastfeeding  Chronic HTN  PEC labs wnl, P/Cr 0 18  Systolic (4hrs), TWC:039 , Min:116 , QXM:365   Diastolic (44LUG), HXI:95, Min:55, Max:92  Continue monitoring Bps         Triage Vitals   Temperature Pulse Respirations Blood Pressure SpO2   22   98 3 °F (36 8 °C) (!) 107 18 126/83 97 %      Temp Source Heart Rate Source Patient Position - Orthostatic VS BP Location FiO2 (%)   22 --   Oral Monitor Lying Right arm       Pain Score       22 0041       No Pain          Wt Readings from Last 1 Encounters:   22 101 kg (223 lb)     Additional Vital Signs:   Date/Time Temp Pulse Resp BP MAP (mmHg) SpO2 O2 Device Cardiac (WDL) Patient Position - Orthostatic VS   11/15/22 0911 98 °F (36 7 °C) 94 20 118/70 -- 98 % -- -- --   11/15/22 0400 97 7 °F (36 5 °C) 92 18 116/67 -- 97 % -- -- Lying 11/15/22 0000 98 6 °F (37 °C) 111 Abnormal  18 129/63 -- 95 % -- -- Lying         Date/Time Temp Pulse Resp BP SpO2 O2 Device Cardiac (WDL) Patient Position - Orthostatic VS   22 1227 -- 105 -- 135/87 -- -- -- --   22 0837 97 9 °F (36 6 °C) 102 18 151/92  99 % None (Room air) -- Lying   BP: notified nurse at 22 0837   22 0830 -- -- -- -- -- -- -- --   Comment rows:   OBSERV: according to the patient, still not feeling baby move  did feel fetal movement, earlier,  while on the monitor  denies s/s of labor   at 22 0830   22 0041 98 °F (36 7 °C) 90 18 126/82 -- -- -- Lying   22 0035 -- -- -- -- -- -- WDL --   Comment rows:   OBSERV: PT reports some increase in fetal movement, denies contraction, vaginal bleeding or leaking of fluid at 22 003     Pertinent Labs/Diagnostic Test Results:   Lab Results   Component Value Date     WBC 13 76 (H) 11/15/2022     HGB 9 3 (L) 11/15/2022     HCT 28 6 (L) 11/15/2022     MCV 91 11/15/2022      11/15/2022         Past Medical History:   Diagnosis Date   • Abnormal weight gain     last assessed: 2016   • Anxiety     last assessed: 2016   • Depression    • High risk HPV infection     (+) type 16 (-) type 18 (-) other HRHPV   • Miscarriage      Admitting Diagnosis: Decreased fetal movement [O36 8190]  37 weeks gestation of pregnancy [Z3A 37]  Encounter for  delivery without indication [O82]  Age/Sex: 29 y o  female  Admission Orders:  NPO  External Uterine contraction monitoring  I&O  Monitor FHT external    Scheduled Medications:  acetaminophen, 650 mg, Oral, Q6H TARI  docusate sodium, 100 mg, Oral, BID  enoxaparin, 40 mg, Subcutaneous, Daily  ketorolac, 15 mg, Intravenous, Q6H  sertraline, 25 mg, Oral, Daily      Continuous IV Infusions:  lactated ringers, 125 mL/hr, Intravenous, Continuous      PRN Meds:  calcium carbonate, 1,000 mg, Oral, Daily PRN  fentaNYL, 50 mcg, Intravenous, Q5 Min PRN  HYDROmorphone, 0 2 mg, Intravenous, Q5 Min PRN  HYDROmorphone, 0 5 mg, Intravenous, Q2H PRN  meperidine, 12 5 mg, Intravenous, Q10 Min PRN  naloxone, 0 1 mg, Intravenous, Q3 min PRN  ondansetron, 4 mg, Intravenous, Q8H PRN  oxyCODONE, 10 mg, Oral, Q4H PRN  oxyCODONE, 5 mg, Oral, Q4H PRN  promethazine, 25 mg, Intravenous, Once PRN  simethicone, 80 mg, Oral, 4x Daily PRN        IP CONSULT TO PERINATOLOGY    Network Utilization Review Department  ATTENTION: Please call with any questions or concerns to [phone] and carefully listen to the prompts so that you are directed to the right person  All voicemails are confidential   Dee Fonseca all requests for admission clinical reviews, approved or denied determinations and any other requests to dedicated fax number below belonging to the campus where the patient is receiving treatment   List of dedicated fax numbers for the Facilities:  [address] DENIALS (Administrative/Medical Necessity) [phone]   [address] (Maternity/NICU/Pediatrics) [phone]   917 Tabatha Salazar [phone]   Ethan Spencer 77 [phone]   [address] [place] [id number] [place] [address] Phil Lee 28 [phone]   [address] [facility] 134 815 [facility] [phone]

## 2022-11-14 NOTE — QUICK NOTE
Patient seen this morning on am rounds  She reports she is still not feeling the baby move, but that she has been sleeping since being admitted last night and that it is normal for her to not feel her when she is moving  She states she was reassured by the normal ALESSANDRO, BPP and multiple reactive NSTs  0600 NST reactive  She has no other OB complaints at this time  SVE unchanged  Discussed fetal kick counts  Will repeat ultrasound today to provide patient with increased reassurance  Plan for discharge later today      Alyson Hernandez MD  PGY-2  11/14/2022  6:55 AM

## 2022-11-14 NOTE — ASSESSMENT & PLAN NOTE
, Hgb 11 4 --> post op Hgb 9 3  Voiding spontaneously  Pain: Tylenol and motrin scheduled, joanna 5/10 PRN    Encouraged ambulation  FEN: Tolerating regular diet  DVT ppx: SCDs and  Lovenox 40mg qD  Passing flatus   Incision C/D/I   GBS positive  Rh positive

## 2022-11-14 NOTE — ANESTHESIA PROCEDURE NOTES
Spinal Block    Patient location during procedure: OB  Start time: 11/14/2022 1:23 PM  Staffing  Performed: CRNA   Resident/CRNA: Martinez Dickey CRNA  Preanesthetic Checklist  Completed: patient identified, IV checked, site marked, risks and benefits discussed, surgical consent, monitors and equipment checked, pre-op evaluation and timeout performed  Spinal Block  Patient position: sitting  Prep: ChloraPrep  Location: L2-3  Injection technique: single-shot  Needle  Needle type: pencil-tip   Needle gauge: 25 G  Needle length: 8 9  Assessment  Sensory level: T4  Events: cerebrospinal fluid  Injection Assessment:  negative aspiration for heme, no paresthesia on injection and positive aspiration for clear CSF    Post-procedure:  site cleaned  Additional Notes  Single attempt

## 2022-11-14 NOTE — TELEPHONE ENCOUNTER
Regardin Weeks Pregnant, Baby not moving as usual  ----- Message from Clarissa Coburn sent at 2022  8:07 PM EST -----  " I am 40 Weeks Pregnant, my baby isn't moving as much as usual today   I felt the baby 10 minutes ago "

## 2022-11-14 NOTE — OP NOTE
OPERATIVE REPORT  PATIENT NAME: Raymundo Nagel    :  1990  MRN: [id number]  Pt Location: [facility] 02    SURGERY DATE: 2022    Surgeon(s) and Role:     * Heidy Galdamez MD - Primary     * Kayden Shafer MD - Assisting    Preop Diagnosis:  Decreased fetal movement affecting management of pregnancy, antepartum, single or unspecified fetus [O36 8190]  Breech presentation, single or unspecified fetus [O32 1XX0]  cHTN  Elevated AFP    Post-Op Diagnosis Codes:  Delivery of 37 week      * Breech presentation, single or unspecified fetus [O32 1XX0]    Procedure(s) (LRB):   SECTION () (N/A)    Specimen(s):  ID Type Source Tests Collected by Time Destination   A :  Cord Blood Cord BLOOD GAS, VENOUS, CORD, BLOOD GAS, ARTERIAL, CORD Heidy Galdamez MD 2022 1348    B :  Tissue (Placenta on Hold) OB Only Placenta PLACENTA IN STORAGE Heidy Galdamez MD 2022 1353        Quantitative Blood Loss: 604 cc    Anesthesia Type: Spinal    Operative Indications:  Decreased fetal movement affecting management of pregnancy, antepartum, single or unspecified fetus [O36 8190]  Breech presentation, single or unspecified fetus [O32 1XX0]    Operative Findings:  1  Viable female  at 65 with APGARs of 8 and 9 at 1 and 5 minutes  Fetus weighted 6lb 10 4 oz   2  Normal intact placenta with centrally inserted 3VC  3  Normal uterus, bilateral tubes and ovaries      Umbilical Cord Venous Blood Gas:  Results from last 7 days   Lab Units 22  1348   PH COV  7 272   PCO2 COV mm HG 53 0*   HCO3 COV mmol/L 23 9   BASE EXC COV mmol/L -3 9*   O2 CT CD VB mL/dL 9 0   O2 HGB, VENOUS CORD % 15 2     Umbilical Cord Arterial Blood Gas:  Results from last 7 days   Lab Units 22  1348   PH COA  7 268   PCO2 COA  48 2   PO2 COA mm HG 20 4   HCO3 COA mmol/L 21 5   BASE EXC COA mmol/L -5 7*   O2 CONTENT CORD ART ml/dl 10 6   O2 HGB, ARTERIAL CORD % 42 0       The patient was taken to the operating room  Spinal anesthesia was adequately established and Ancef 2g IV was given for preoperative prophylaxis  The patient was then placed in the dorsal supine position with a left tilt of the hips  The patient was then prepped with betadine for vaginal prep and chloraprep for abdominal prep and draped in the usual sterile fashion for a Pfannenstiel skin incision  A time out was performed to confirm correct patient and correct procedure  An incision was made in the skin with a surgical scalpel and sharp dissection was carried out over subsequent layers of tissue including the fascia, followed by the Bovie electrocautery for hemostasis  The fascia was incised at the midline and the fascial incision was extended bilaterally using the curved Bernabe scissors  The superior edge of the fascial incision was grasped with Kocher clamps, tented up and the underlying rectus muscles were dissected off bluntly and sharply using the curved Bernabe scissors  The rectus muscles were then divided at midline and the peritoneum was identified, tented up at its upper margin taking care to avoid the bladder, and then entered  The peritoneal incision was extended superiorly and inferiorly  The Timo retractor was inserted and a transverse incision was made in the lower uterine segment using a new surgical blade  The uterine incision was extended cephalad and caudal using blunt dissection  The amniotic sac was entered and the amniotic fluid was noted to be clear  Surgeon's hand was inserted through the hysterotomy and the fetal sacrum was palpated, elevated, and delivered through the uterine incision with the assistance of gentle fundal pressure  Fetal body was delivered to the level of the scapula, after which time the legs delivered spontaneously  The right, anterior arm was palpated, elbow flexed, and arm swept down and across the chest for delivery   The  was rotated to the contralateral side using a breech towel  The contralateral arm was palpated and delivered in similar fashion  The fetal zygomatic arches were palpated and fetal head was flexed for delivery  There was no nuchal cord noted  Following delivery the cord was doubly clamped and cut after delayed cord clamping  The infant was then passed off the table to the awaiting  staff  The  was noted to cry spontaneously and moved all extremities  Venous and arterial blood gas, cord blood, and portion of cord was obtained for analysis and routine blood testing  The placenta delivered with uterine massage and was noted to be intact with a central insertion of a three-vessel cord  The placenta was sent to pathology  Oxytocin was administered by IV infusion to enhance uterine contraction  The uterus was exteriorized and cleared of all clots and remaining products of conception  The uterine incision was re approximated using a 0-Monocryl in a running locked fashion  A second horizontal imbricating stitch with 0-Monocryl was applied  The uterine incision was examined and noted to be hemostatic  The posterior cul-de-sac was cleared of all clots and products of conception  The uterus was replaced into the abdomen and the pericolic gutters were cleared of all clots  The uterine incision was once again reexamined and nuknit was placed for hemostasis  The fascia was re approximated using 0-Vicryl in a running nonlocked fashion  The subcutaneous tissue was irrigated and cleared of all clots and debris  Good hemostasis was noted with Bovie electrocautery  The subcutaneous tissue was reapproximated with 2-0 Vicryl  The skin incision was closed using 4-0 Monocryl  Good hemostasis was noted  Patient tolerated the procedure well  All needle, sponge, and instrument counts were noted to be correct x 2 at the end of the procedure  Exofin and Telfa were placed on the incision  Patient was transferred to the recovery room in stable condition  Dr Chris Walden was present for the procedure      SIGNATURERobson Ardon MD  DATE: November 14, 2022  TIME: 2:31 PM

## 2022-11-14 NOTE — H&P
H & P- Obstetrics   Juliette Nagel 29 y o  female MRN: [id number]  Unit/Bed#: -01 Encounter: [id number]    Assessment: 29 y o  Arnel Johnson at 37w4d admitted for observation for decreased fetal movement  Breathing, gross and fine movement all appreciated on ultrasound, ALESSANDRO normal, and NST reactive  Patient very anxious that she cannot feel baby move and not reassured by workup  She remains unable to feel fetal movement  FHT: reactive  Breech presentation  GBS status: positive     Plan:   * 37 weeks gestation of pregnancy  Assessment & Plan  -Admit for observation  -NST on admission and repeat NST at 0600  -ALESSANDRO normal, 10 64cm  -Breathing movement, gross movement, and fine movement seen on ultrasound        Discussed case and plan w/ Dr Emely Seth      Chief Complaint: decreased fetal movement    HPI: Maria Teresa Carlson is a 29 y o  [place] with an MIGUEL of 2022, by Last Menstrual Period at 37w4d who is being admitted for observation for decreased fetal movement  She reports since soon she has felt baby move a lot less than usual  She denies having uterine contractions, has no LOF, and reports no VB  Patient Active Problem List   Diagnosis   • Anxiety   • Celiac disease   • OCD (obsessive compulsive disorder)   • Vanishing twin syndrome   • Abnormal MSAFP (maternal serum alpha-fetoprotein), elevated   • 37 weeks gestation of pregnancy   • High risk HPV infection   • Vanishing twin syndrome   • Anemia during pregnancy in third trimester   • History of back surgery   • Elevated blood pressure reading in office without diagnosis of hypertension   • Decreased fetal movement       Baby complications/comments: elevated MSAFP with no sonographic evidence concerning for spina bifida or open neural tube defect  Early vanishing twin syndrome  Breech presentation- patient scheduled for  section    Review of Systems   Constitutional: Negative for chills and fever  HENT: Negative for ear pain and sore throat  Eyes: Negative for pain and visual disturbance  Respiratory: Negative for cough and shortness of breath  Cardiovascular: Negative for chest pain and palpitations  Gastrointestinal: Negative for abdominal pain, constipation, diarrhea and vomiting  Genitourinary: Negative for dysuria and hematuria  Musculoskeletal: Negative for arthralgias and back pain  Skin: Negative for color change and rash  Neurological: Negative for seizures, syncope, light-headedness and headaches  All other systems reviewed and are negative  OB Hx:  OB History    Para Term  AB Living   2       1     SAB IAB Ectopic Multiple Live Births   1              # Outcome Date GA Lbr Asher/2nd Weight Sex Delivery Anes PTL Lv   2 Current            1 SAB 21 11w0d             Obstetric Comments   : 2021 SAB 11w naturally;    vanishing twin; not immune Hep B and rubella    CF/SMA neg       Past Medical Hx:  Past Medical History:   Diagnosis Date   • Abnormal weight gain     last assessed: 2016   • Anxiety     last assessed: 2016   • Depression    • High risk HPV infection     (+) type 16 (-) type 18 (-) other HRHPV   • Miscarriage        Past Surgical hx:  Past Surgical History:   Procedure Laterality Date   • BACK SURGERY      discectomy L5-S1; last assessed: 2015   • LUMBAR DISCECTOMY      [facility] @ L5/S1   • [place] COLONOSCOPY FLX DX W/COLLJ SPEC WHEN PFRMD N/A 2016    Procedure: COLONOSCOPY;  Surgeon: Roe Novak MD;  Location: [facility];   Service: Gastroenterology       Social Hx:  Social History     Tobacco Use   • Smoking status: Never Smoker   • Smokeless tobacco: Never Used   Vaping Use   • Vaping Use: Never used   Substance Use Topics   • Alcohol use: Not Currently     Comment: socially (does not drink alcohol-as per [employer])   • Drug use: No         Allergies   Allergen Reactions   • Hyoscyamine Hallucinations     Patient states she had hallucinations  • Amoxicillin Rash     Other reaction(s): AMOXICILLIN TRIHYDRATE (AMOXICILLIN)   • Gluten Meal - Food Allergy GI Intolerance       Medications Prior to Admission   Medication   • Docosahexaenoic Acid (DHA PO)   • Prenatal Vit-Fe Fumarate-FA (PRENATAL 19 PO)   • sertraline (ZOLOFT) 25 mg tablet       Objective:  Temp:  [98 °F (36 7 °C)-98 3 °F (36 8 °C)] 98 °F (36 7 °C)  HR:  [] 90  Resp:  [18] 18  BP: (126)/(82-83) 126/82  Body mass index is 32 93 kg/m²  Physical Exam:  Physical Exam  Constitutional:       Appearance: Normal appearance  She is not ill-appearing  HENT:      Head: Normocephalic and atraumatic  Mouth/Throat:      Mouth: Mucous membranes are moist       Pharynx: Oropharynx is clear  Cardiovascular:      Rate and Rhythm: Normal rate and regular rhythm  Pulses: Normal pulses  Heart sounds: Normal heart sounds  Pulmonary:      Effort: Pulmonary effort is normal  No respiratory distress  Breath sounds: Normal breath sounds  No wheezing or rales  Abdominal:      Palpations: Abdomen is soft  Comments: Gravid     Musculoskeletal:      Right lower leg: No edema  Left lower leg: No edema  Neurological:      General: No focal deficit present  Mental Status: She is alert and oriented to person, place, and time  Mental status is at baseline  Skin:     General: Skin is warm and dry  Capillary Refill: Capillary refill takes less than 2 seconds              FHT:  Baseline Rate: 135 bpm  Variability: Moderate 6-25 bpm  Accelerations: 15 x 15 or greater  Decelerations: None  FHR Category: Category I    TOCO:   Contraction Frequency (minutes): 0  Contraction Duration (seconds): N/A  Contraction Quality: Not applicable    Lab Results   Component Value Date    WBC 8 96 11/02/2022    HGB 10 6 (L) 11/02/2022    HCT 32 9 (L) 11/02/2022     11/02/2022     Lab Results   Component Value Date     01/13/2018    K 3 9 11/02/2022     11/02/2022    CO2 22 11/02/2022    BUN 8 11/02/2022    CREATININE 0 56 (L) 11/02/2022    AST 13 11/02/2022    ALT 10 11/02/2022     Prenatal Labs: Reviewed      Blood type: O pos  GBS: positive  Rubella: Immune  VDRL/RPR: Non reactive  HBsAg: Negative  Chlamydia: Negative  Gonorrhea: Negative  Diabetes 1 hour screen: normal  3 hour glucose: n/a    <2 Midnights  OBSERVATION    Signature/Title: Renuka Dyer MD  Date: 11/14/2022  Time: 4:04 AM

## 2022-11-14 NOTE — PROCEDURES
Juliette Nagel, a  at 37w4d with an MIGUEL of 2022, by Last Menstrual Period, was seen at [address] for the following procedure(s): $Procedure Type: ALESSANDRO]         4 Quadrant ALESSANDRO  ALESSANDRO Q1 (cm): 2 4 cm  ALESSANDRO Q2 (cm): 3 5 cm  ALESSANDRO Q3 (cm): 2 8 cm  ALESSANDRO Q4 (cm): 1 9 cm  ALESSANDRO TOTAL (cm): 10 6 cm                     Nehemias Porter MD  Obstetrics & Gynecology, PGY1

## 2022-11-15 PROBLEM — Z98.891 S/P CESAREAN SECTION: Status: ACTIVE | Noted: 2022-09-09

## 2022-11-15 PROBLEM — I10 CHRONIC HYPERTENSION: Status: ACTIVE | Noted: 2022-11-15

## 2022-11-15 LAB
ERYTHROCYTE [DISTWIDTH] IN BLOOD BY AUTOMATED COUNT: 14.4 % (ref 11.6–15.1)
HCT VFR BLD AUTO: 28.6 % (ref 34.8–46.1)
HGB BLD-MCNC: 9.3 G/DL (ref 11.5–15.4)
MCH RBC QN AUTO: 29.5 PG (ref 26.8–34.3)
MCHC RBC AUTO-ENTMCNC: 32.5 G/DL (ref 31.4–37.4)
MCV RBC AUTO: 91 FL (ref 82–98)
PLATELET # BLD AUTO: 175 THOUSANDS/UL (ref 149–390)
PMV BLD AUTO: 11.7 FL (ref 8.9–12.7)
RBC # BLD AUTO: 3.15 MILLION/UL (ref 3.81–5.12)
WBC # BLD AUTO: 13.76 THOUSAND/UL (ref 4.31–10.16)

## 2022-11-15 RX ADMIN — ACETAMINOPHEN 650 MG: 325 TABLET ORAL at 20:24

## 2022-11-15 RX ADMIN — ACETAMINOPHEN 650 MG: 325 TABLET ORAL at 06:28

## 2022-11-15 RX ADMIN — ACETAMINOPHEN 650 MG: 325 TABLET ORAL at 00:02

## 2022-11-15 RX ADMIN — DOCUSATE SODIUM 100 MG: 100 CAPSULE, LIQUID FILLED ORAL at 20:24

## 2022-11-15 RX ADMIN — SERTRALINE 25 MG: 25 TABLET, FILM COATED ORAL at 09:48

## 2022-11-15 RX ADMIN — OXYCODONE HYDROCHLORIDE 10 MG: 10 TABLET ORAL at 21:56

## 2022-11-15 RX ADMIN — DOCUSATE SODIUM 100 MG: 100 CAPSULE, LIQUID FILLED ORAL at 09:48

## 2022-11-15 RX ADMIN — ACETAMINOPHEN 650 MG: 325 TABLET ORAL at 13:19

## 2022-11-15 RX ADMIN — KETOROLAC TROMETHAMINE 15 MG: 30 INJECTION, SOLUTION INTRAMUSCULAR at 14:26

## 2022-11-15 RX ADMIN — KETOROLAC TROMETHAMINE 15 MG: 30 INJECTION, SOLUTION INTRAMUSCULAR at 02:15

## 2022-11-15 RX ADMIN — ENOXAPARIN SODIUM 40 MG: 40 INJECTION SUBCUTANEOUS at 09:48

## 2022-11-15 RX ADMIN — KETOROLAC TROMETHAMINE 15 MG: 30 INJECTION, SOLUTION INTRAMUSCULAR at 08:54

## 2022-11-15 NOTE — PLAN OF CARE
Problem: PAIN - ADULT  Goal: Verbalizes/displays adequate comfort level or baseline comfort level  Description: Interventions:  - Encourage patient to monitor pain and request assistance  - Assess pain using appropriate pain scale  - Administer analgesics based on type and severity of pain and evaluate response  - Implement non-pharmacological measures as appropriate and evaluate response  - Consider cultural and social influences on pain and pain management  - Notify physician/advanced practitioner if interventions unsuccessful or patient reports new pain  Outcome: Progressing     Problem: INFECTION - ADULT  Goal: Absence or prevention of progression during hospitalization  Description: INTERVENTIONS:  - Assess and monitor for signs and symptoms of infection  - Monitor lab/diagnostic results  - Monitor all insertion sites, i e  indwelling lines, tubes, and drains  - Monitor endotracheal if appropriate and nasal secretions for changes in amount and color  - [place] appropriate cooling/warming therapies per order  - Administer medications as ordered  - Instruct and encourage patient and family to use good hand hygiene technique  - Identify and instruct in appropriate isolation precautions for identified infection/condition  Outcome: Progressing     Problem: SAFETY ADULT  Goal: Patient will remain free of falls  Description: INTERVENTIONS:  - Educate patient/family on patient safety including physical limitations  - Instruct patient to call for assistance with activity   - Consult OT/PT to assist with strengthening/mobility   - Keep Call bell within reach  - Keep bed low and locked with side rails adjusted as appropriate  - Keep care items and personal belongings within reach  - Initiate and maintain comfort rounds  - Make Fall Risk Sign visible to staff  -  - Apply yellow socks and bracelet for high fall risk patients  - Consider moving patient to room near nurses station  Outcome: Progressing  Goal: Maintain or return to baseline ADL function  Description: INTERVENTIONS:  -  Assess patient's ability to carry out ADLs; assess patient's baseline for ADL function and identify physical deficits which impact ability to perform ADLs (bathing, care of mouth/teeth, toileting, grooming, dressing, etc )  - Assess/evaluate cause of self-care deficits   - Assess range of motion  - Assess patient's mobility; develop plan if impaired  - Assess patient's need for assistive devices and provide as appropriate  - Encourage maximum independence but intervene and supervise when necessary  - Involve family in performance of ADLs  - Assess for home care needs following discharge   - Consider OT consult to assist with ADL evaluation and planning for discharge  - Provide patient education as appropriate  Outcome: Progressing  Goal: Maintains/Returns to pre admission functional level  Description: INTERVENTIONS:  - Perform BMAT or MOVE assessment daily    - Set and communicate daily mobility goal to care team and patient/family/caregiver  - Collaborate with rehabilitation services on mobility goals if consulted  - - Out of bed for toileting  - Record patient progress and toleration of activity level   Outcome: Progressing     Problem: Knowledge Deficit  Goal: Patient/family/caregiver demonstrates understanding of disease process, treatment plan, medications, and discharge instructions  Description: Complete learning assessment and assess knowledge base    Interventions:  - Provide teaching at level of understanding  - Provide teaching via preferred learning methods  Outcome: Progressing     Problem: DISCHARGE PLANNING  Goal: Discharge to home or other facility with appropriate resources  Description: INTERVENTIONS:  - Identify barriers to discharge w/patient and caregiver  - Arrange for needed discharge resources and transportation as appropriate  - Identify discharge learning needs (meds, wound care, etc )  - Arrange for interpretive services to assist at discharge as needed  - Refer to Case Management Department for coordinating discharge planning if the patient needs post-hospital services based on physician/advanced practitioner order or complex needs related to functional status, cognitive ability, or social support system  Outcome: Progressing     Problem: POSTPARTUM  Goal: Experiences normal postpartum course  Description: INTERVENTIONS:  - Monitor maternal vital signs  - Assess uterine involution and lochia  Outcome: Progressing  Goal: Appropriate maternal -  bonding  Description: INTERVENTIONS:  - Identify family support  - Assess for appropriate maternal/infant bonding   -Encourage maternal/infant bonding opportunities  - Referral to  or  as needed  Outcome: Progressing  Goal: Establishment of infant feeding pattern  Description: INTERVENTIONS:  - Assess breast/bottle feeding  - Refer to lactation as needed  Outcome: Progressing  Goal: Incision(s), wounds(s) or drain site(s) healing without S/S of infection  Description: INTERVENTIONS  - Assess and document dressing, incision, wound bed, drain sites and surrounding tissue  - Provide patient and family education  -Outcome: Progressing

## 2022-11-15 NOTE — ASSESSMENT & PLAN NOTE
PEC labs wnl, P/Cr 2 69  Systolic (83NNU), LYD:226 , Min:113 , RD     Diastolic (67XKP), DKB:99, Min:69, Max:82  Asymptomatic for Pre-E at this time  Continue monitoring BPs

## 2022-11-15 NOTE — PROGRESS NOTES
Progress Note - OB/GYN   Lay Nagel 29 y o  female MRN: [id number]  Unit/Bed#: -01 Encounter: [id number]    Assessment:  Post partum Day #1 s/p 1LTCS for breech, stable, baby in room    Plan:  * S/P  section  Assessment & Plan  QBL: 604 cc, Hgb: 11 4--> 9 3  [place]cal[place] removed, voided once, pending rest of void trial  Continue routine post partum care  Encourage ambulation  Encourage breastfeeding    Chronic hypertension  Assessment & Plan  PEC labs wnl, P/Cr 5 73  Systolic (4hrs), CKW:348 , Min:116 , WG   Diastolic (72KXN), YCL:42, Min:55, Max:92  Asymptomatic for Pre-E at this time  Continue monitoring BPs    Subjective/Objective   Chief Complaint:     Post delivery  Patient is doing well  Lochia WNL  Pain well controlled  Subjective:     Pain: yes, cramping, improved with meds  Tolerating PO: yes  Voiding: yes  Flatus: yes  BM: no  Ambulating: yes  Chest pain: no  Shortness of breath: no  Leg pain: no  Lochia: minimal    Objective:     Vitals: /67 (BP Location: Right arm)   Pulse 92   Temp 97 7 °F (36 5 °C) (Oral)   Resp 18   Ht 5' 9" (1 753 m)   Wt 101 kg (223 lb)   LMP 2022   SpO2 97%   Breastfeeding Yes   BMI 32 93 kg/m²       Intake/Output Summary (Last 24 hours) at 11/15/2022 0710  Last data filed at 11/15/2022 0550  Gross per 24 hour   Intake 600 ml   Output 2454 ml   Net -1854 ml       Lab Results   Component Value Date    WBC 13 76 (H) 11/15/2022    HGB 9 3 (L) 11/15/2022    HCT 28 6 (L) 11/15/2022    MCV 91 11/15/2022     11/15/2022       Physical Exam:     Gen: AAOx3, NAD  CV: RRR  Lungs: CTA b/l  Abd: Soft, non-tender, non-distended, no rebound or guarding  Uterine fundus firm and non-tender, 2 cm below the umbilicus   Incision c/d/i  Ext: Non tender    Leif Muniz MD  11/15/2022  7:10 AM

## 2022-11-16 RX ORDER — IBUPROFEN 600 MG/1
600 TABLET ORAL EVERY 6 HOURS PRN
Status: DISCONTINUED | OUTPATIENT
Start: 2022-11-16 | End: 2022-11-17 | Stop reason: HOSPADM

## 2022-11-16 RX ADMIN — SERTRALINE 25 MG: 25 TABLET, FILM COATED ORAL at 09:33

## 2022-11-16 RX ADMIN — IBUPROFEN 600 MG: 600 TABLET, FILM COATED ORAL at 11:46

## 2022-11-16 RX ADMIN — ACETAMINOPHEN 650 MG: 325 TABLET ORAL at 09:33

## 2022-11-16 RX ADMIN — DOCUSATE SODIUM 100 MG: 100 CAPSULE, LIQUID FILLED ORAL at 09:33

## 2022-11-16 RX ADMIN — IBUPROFEN 600 MG: 600 TABLET, FILM COATED ORAL at 23:53

## 2022-11-16 RX ADMIN — ENOXAPARIN SODIUM 40 MG: 40 INJECTION SUBCUTANEOUS at 09:33

## 2022-11-16 RX ADMIN — DOCUSATE SODIUM 100 MG: 100 CAPSULE, LIQUID FILLED ORAL at 17:15

## 2022-11-16 RX ADMIN — OXYCODONE HYDROCHLORIDE 10 MG: 10 TABLET ORAL at 17:15

## 2022-11-16 RX ADMIN — ACETAMINOPHEN 650 MG: 325 TABLET ORAL at 14:52

## 2022-11-16 RX ADMIN — ACETAMINOPHEN 650 MG: 325 TABLET ORAL at 03:19

## 2022-11-16 RX ADMIN — ACETAMINOPHEN 650 MG: 325 TABLET ORAL at 21:12

## 2022-11-16 RX ADMIN — IBUPROFEN 600 MG: 600 TABLET, FILM COATED ORAL at 18:05

## 2022-11-16 NOTE — LACTATION NOTE
This note was copied from a baby's chart  Discharge / Follow up Lactation: mom states baby has been cluster feeding all night  Demonstration and teach back of breast compressions  Review of feeding on demand  Review of feeding log    Upon breast assessment: symmetrical, round, full breasts, edema  Noted  Dark areola and everted nipples  L nipple presents with cracks/scabs on nipple face from shallow latch  Brought baby up to the left breast in football hold  Demonstration and teach back of chin deep into breast tissue, unlatching techniques, and how to achieve a wide mouth and deep latch  U shaped hold to the breast  Active, coordinated sucking  Mom wants baby and me appt - lft vm to schedule    D/C reviewed    Enc  To call lactation for additional support  Discussed 2nd night syndrome and ways to calm infant  Hand out given  Information on hand expression given  Discussed benefits of knowing how to manually express breast including stimulating milk supply, softening nipple for latch and evacuating breast in the event of engorgement  Demonstrated with teach back breast compressions during a feeding to increase milk transfer and stimulate suckling after a breathing/muscle break  Nurse on demand: when baby gives hunger cues; when your breasts feel full, or at least every 3 hours during the day and every 5 hours at night counting from the beginning of one feeding to the beginning of the next; which ever comes first  When sucking and swallowing slow, gently compress the breast to restart flow  If active suck-swallow does not restart, gently remove the baby and offer the other breast; offering up to "four" breasts per feeding  Discussed with MOB how to utilize feeding log & monitor baby's output  Education on signs of satiation during a feeding, size of baby's belly, and offering both breasts at every feeding session provided      To help your nipples heal, in addition to paying close attention to latch and positioning, apply protective ointment after feeding or pumping and cover with an occlusive dressing  Education on positioning and alignment  Mom is encouraged to:     - Bring baby up to the breast (use of pillows to elevate so baby's torso is against mom's breasts)   - Skin to skin for feedings with top hand exposed to show signs of satiation   - Chin deep into breast tissue (make baby look up to the nipple)   - nose aligned to the nipple   -Wait for wide gape, drag chin on the breast so nipple is aimed at the upper, back palate  - Cheek should be touching breast   - Deep, firm hold of baby with ear, shoulder, hip alignment    Provided demonstration, education and support of deep latch to breast by placing the nipple to the nose, dragging down to chin to achieve a wide latch  Bring baby to the breast, not breast to baby  Move your shoulders down and away from your ears  Look for ear, shoulder, hip alignment  Baby's upper and lower lip should be flanged on the breast     Mom requested info on setting up [employer]ra  Education on turning on the pump, press the 3 wavy lines to place pump on stimulation mode (high cycle, low vacuum) Set vacuum to comfort with light suction  After 3 min, press 3 wavy lines and change setting to Expression mode (low Cycle, High vacuum) Vacuum setting should not pinch, only tug the nipple  Now pump is set  Next time mom pumps, will only need to turn on pump and press 3 wavy line button to change cycle three times in a pumping session  Provided education on growth spurts, when to introduce bottles; paced bottle feeding, and non-nutritive suck at the breast  Provided education on Signs of satiation  Encouraged to call lactation to observe a latch prior to discharge for reassurance  Encouraged to call baby and me with any questions and closely monitor output

## 2022-11-16 NOTE — LACTATION NOTE
This note was copied from a baby's chart  Mom called in to help wake & feed sleepy baby  Baby sat up and burped  Alert with hunger cues  Guided baby to deeper latch on right breast using football hold, stimulated to suck  Converted to rocker suckling  Worked on positioning infant up at chest level and starting to feed infant with nose arriving at the nipple  Then, using areolar compression to achieve a deep latch that is comfortable and exchanges optimum amounts of milk  Then placed on left breast using football hold  Stimulated for rocker suckling till popped off breast with relaxed tone  Dad is actively supportive at bedside  Encouraged parents to call for assistance, questions, and concerns about breastfeeding  Extension provided

## 2022-11-16 NOTE — PLAN OF CARE
Problem: PAIN - ADULT  Goal: Verbalizes/displays adequate comfort level or baseline comfort level  Description: Interventions:  - Encourage patient to monitor pain and request assistance  - Assess pain using appropriate pain scale  - Administer analgesics based on type and severity of pain and evaluate response  - Implement non-pharmacological measures as appropriate and evaluate response  - Consider cultural and social influences on pain and pain management  - Notify physician/advanced practitioner if interventions unsuccessful or patient reports new pain  Outcome: Progressing     Problem: INFECTION - ADULT  Goal: Absence or prevention of progression during hospitalization  Description: INTERVENTIONS:  - Assess and monitor for signs and symptoms of infection  - Monitor lab/diagnostic results  - Monitor all insertion sites, i e  indwelling lines, tubes, and drains  - Monitor endotracheal if appropriate and nasal secretions for changes in amount and color  - [place] appropriate cooling/warming therapies per order  - Administer medications as ordered  - Instruct and encourage patient and family to use good hand hygiene technique  - Identify and instruct in appropriate isolation precautions for identified infection/condition  Outcome: Progressing     Problem: SAFETY ADULT  Goal: Patient will remain free of falls  Description: INTERVENTIONS:  - Educate patient/family on patient safety including physical limitations  - Instruct patient to call for assistance with activity   - Consult OT/PT to assist with strengthening/mobility   - Keep Call bell within reach  - Keep bed low and locked with side rails adjusted as appropriate  - Keep care items and personal belongings within reach  - Initiate and maintain comfort rounds  - Make Fall Risk Sign visible to staff  - Offer Toileting every  Hours, in advance of need  - Initiate/Maintain alarm  - Obtain necessary fall risk management equipment:   - Apply yellow socks and bracelet for high fall risk patients  - Consider moving patient to room near nurses station  Outcome: Progressing  Goal: Maintain or return to baseline ADL function  Description: INTERVENTIONS:  -  Assess patient's ability to carry out ADLs; assess patient's baseline for ADL function and identify physical deficits which impact ability to perform ADLs (bathing, care of mouth/teeth, toileting, grooming, dressing, etc )  - Assess/evaluate cause of self-care deficits   - Assess range of motion  - Assess patient's mobility; develop plan if impaired  - Assess patient's need for assistive devices and provide as appropriate  - Encourage maximum independence but intervene and supervise when necessary  - Involve family in performance of ADLs  - Assess for home care needs following discharge   - Consider OT consult to assist with ADL evaluation and planning for discharge  - Provide patient education as appropriate  Outcome: Progressing  Goal: Maintains/Returns to pre admission functional level  Description: INTERVENTIONS:  - Perform BMAT or MOVE assessment daily    - Set and communicate daily mobility goal to care team and patient/family/caregiver  - Collaborate with rehabilitation services on mobility goals if consulted  - Perform Range of Motion  times a day  - Reposition patient every  hours  - Dangle patient  times a day  - Stand patient  times a day  - Ambulate patient  times a day  - Out of bed to chair  times a day   - Out of bed for meal times a day  - Out of bed for toileting  - Record patient progress and toleration of activity level   Outcome: Progressing     Problem: Knowledge Deficit  Goal: Patient/family/caregiver demonstrates understanding of disease process, treatment plan, medications, and discharge instructions  Description: Complete learning assessment and assess knowledge base    Interventions:  - Provide teaching at level of understanding  - Provide teaching via preferred learning methods  Outcome: Progressing     Problem: DISCHARGE PLANNING  Goal: Discharge to home or other facility with appropriate resources  Description: INTERVENTIONS:  - Identify barriers to discharge w/patient and caregiver  - Arrange for needed discharge resources and transportation as appropriate  - Identify discharge learning needs (meds, wound care, etc )  - Arrange for interpretive services to assist at discharge as needed  - Refer to Case Management Department for coordinating discharge planning if the patient needs post-hospital services based on physician/advanced practitioner order or complex needs related to functional status, cognitive ability, or social support system  Outcome: Progressing     Problem: POSTPARTUM  Goal: Experiences normal postpartum course  Description: INTERVENTIONS:  - Monitor maternal vital signs  - Assess uterine involution and lochia  Outcome: Progressing  Goal: Appropriate maternal -  bonding  Description: INTERVENTIONS:  - Identify family support  - Assess for appropriate maternal/infant bonding   -Encourage maternal/infant bonding opportunities  - Referral to  or  as needed  Outcome: Progressing  Goal: Establishment of infant feeding pattern  Description: INTERVENTIONS:  - Assess breast/bottle feeding  - Refer to lactation as needed  Outcome: Progressing  Goal: Incision(s), wounds(s) or drain site(s) healing without S/S of infection  Description: INTERVENTIONS  - Assess and document dressing, incision, wound bed, drain sites and surrounding tissue  - Provide patient and family education  - Perform skin care/dressing changes every   Outcome: Progressing

## 2022-11-16 NOTE — PROGRESS NOTES
Progress Note - OB/GYN  Latosha Nagel 29 y o  female MRN: [id number]  Unit/Bed#: -01 Encounter: [id number]    Assessment and Plan     Juliette Nagel is a patient of: [facility]   She is PPD# 2 s/p  primary  section, low transverse incision  Recovering well and is stable       Chronic hypertension  Assessment & Plan  PEC labs wnl, P/Cr 2 70  Systolic (58OOB), OOL:809 , Min:113 , QWS:054     Diastolic (90YOD), USZ:11, Min:64, Max:77  Asymptomatic for Pre-E at this time  Continue monitoring BPs    * S/P  section  Assessment & Plan  , Hgb 11 4 --> post op Hgb 9 3  Voiding spontaneously  Pain: Tylenol and toradol scheduled, joanna 5/10 PRN    Encouraged ambulation  FEN: Tolerating regular diet  DVT ppx: SCDs and  Lovenox 40mg qD  Passing flatus   Incision C/D/I   GBS positive  Rh positive          Disposition    - Anticipate discharge home on PPD# 3      Subjective/Objective     Chief Complaint: Postpartum State     Subjective:    Juliette Nagel is PPD/POD#2 s/p  primary  section, low transverse incision  She reports lower abdominal pain with ambulation  Pain has been controlled with tylenol, motrin and roxicodone  Patient is currently voiding  She is ambulating  Patient is currently passing flatus and has had bowel movement  She is tolerating PO, and denies nausea or vomitting  Patient denies fever, chills, chest pain, shortness of breath, or calf tenderness  Lochia is minimal  She is  Breastfeeding  She is recovering well and is stable         Vitals:   /66 (BP Location: Left arm)   Pulse 93   Temp 98 5 °F (36 9 °C) (Oral)   Resp 18   Ht 5' 9" (1 753 m)   Wt 101 kg (223 lb)   LMP 2022   SpO2 98%   Breastfeeding Yes   BMI 32 93 kg/m²       Intake/Output Summary (Last 24 hours) at 2022 0547  Last data filed at 11/15/2022 0550  Gross per 24 hour   Intake --   Output 550 ml   Net -550 ml       Invasive Devices     None                 Physical Exam: GEN: Juliette Nagel appears well, alert and oriented x 3, pleasant and cooperative   CARDIO: RRR, no murmurs or rubs  RESP:  CTAB, no wheezes or rales  ABDOMEN: soft, no tenderness, no distention, fundus @ u-1, Incision C/D/I  EXTREMITIES: SCDs on, non tender, no erythema    Labs:     Hemoglobin   Date Value Ref Range Status   11/15/2022 9 3 (L) 11 5 - 15 4 g/dL Final   11/14/2022 11 4 (L) 11 5 - 15 4 g/dL Final   01/13/2018 13 8 11 7 - 15 5 g/dL Final   12/01/2016 13 6 11 7 - 15 5 g/dL Final     WBC   Date Value Ref Range Status   11/15/2022 13 76 (H) 4 31 - 10 16 Thousand/uL Final   11/14/2022 7 75 4 31 - 10 16 Thousand/uL Final   01/13/2018 4 3 3 8 - 10 8 Thousand/uL Final   12/01/2016 4 6 3 8 - 10 8 Thousand/uL Final     Platelets   Date Value Ref Range Status   11/15/2022 175 149 - 390 Thousands/uL Final   11/14/2022 178 149 - 390 Thousands/uL Final   01/13/2018 350 140 - 400 Thousand/uL Final   12/01/2016 270 140 - 400 Thousand/uL Final     Creatinine   Date Value Ref Range Status   11/14/2022 0 55 (L) 0 60 - 1 30 mg/dL Final     Comment:     Standardized to IDMS reference method   11/02/2022 0 56 (L) 0 60 - 1 30 mg/dL Final     Comment:     Standardized to IDMS reference method   01/13/2018 0 93 0 50 - 1 10 mg/dL Final   12/01/2016 0 78 0 50 - 1 10 mg/dL Final     AST   Date Value Ref Range Status   11/14/2022 14 13 - 39 U/L Final     Comment:     Specimen collection should occur prior to Sulfasalazine administration due to the potential for falsely depressed results  11/02/2022 13 13 - 39 U/L Final     Comment:     Specimen collection should occur prior to Sulfasalazine administration due to the potential for falsely depressed results      01/13/2018 19 10 - 30 U/L Final   12/01/2016 15 10 - 30 U/L Final   12/01/2016 15 10 - 30 U/L Final     ALT   Date Value Ref Range Status   11/14/2022 10 7 - 52 U/L Final     Comment:     Specimen collection should occur prior to Sulfasalazine administration due to the potential for falsely depressed results  11/02/2022 10 7 - 52 U/L Final     Comment:     Specimen collection should occur prior to Sulfasalazine administration due to the potential for falsely depressed results      01/13/2018 14 6 - 29 U/L Final     Comment:     Performed at: [id number],[facility] [place], [address] [place] [id number]     12/01/2016 11 6 - 29 U/L Final     Comment:     Performed at: [id number],[facility] [place], [address] [place] [id number]     12/01/2016 11 6 - 29 U/L Final     Comment:     Performed at: [id number],[facility] [place], [place] [address]  [address]   [address]             [facility]  11/16/2022  5:47 AM

## 2022-11-16 NOTE — PLAN OF CARE
Problem: PAIN - ADULT  Goal: Verbalizes/displays adequate comfort level or baseline comfort level  Description: Interventions:  - Encourage patient to monitor pain and request assistance  - Assess pain using appropriate pain scale  - Administer analgesics based on type and severity of pain and evaluate response  - Implement non-pharmacological measures as appropriate and evaluate response  - Consider cultural and social influences on pain and pain management  - Notify physician/advanced practitioner if interventions unsuccessful or patient reports new pain  Outcome: Progressing     Problem: INFECTION - ADULT  Goal: Absence or prevention of progression during hospitalization  Description: INTERVENTIONS:  - Assess and monitor for signs and symptoms of infection  - Monitor lab/diagnostic results  - Monitor all insertion sites, i e  indwelling lines, tubes, and drains  - Monitor endotracheal if appropriate and nasal secretions for changes in amount and color  - [place] appropriate cooling/warming therapies per order  - Administer medications as ordered  - Instruct and encourage patient and family to use good hand hygiene technique  - Identify and instruct in appropriate isolation precautions for identified infection/condition  Outcome: Progressing     Problem: SAFETY ADULT  Goal: Patient will remain free of falls  Description: INTERVENTIONS:  - Educate patient/family on patient safety including physical limitations  - Instruct patient to call for assistance with activity   - Consult OT/PT to assist with strengthening/mobility   - Keep Call bell within reach  - Keep bed low and locked with side rails adjusted as appropriate  - Keep care items and personal belongings within reach  - Initiate and maintain comfort rounds  - Make Fall Risk Sign visible to staff  - Apply yellow socks and bracelet for high fall risk patients  - Consider moving patient to room near nurses station  Outcome: Progressing  Goal: Maintain or return to baseline ADL function  Description: INTERVENTIONS:  -  Assess patient's ability to carry out ADLs; assess patient's baseline for ADL function and identify physical deficits which impact ability to perform ADLs (bathing, care of mouth/teeth, toileting, grooming, dressing, etc )  - Assess/evaluate cause of self-care deficits   - Assess range of motion  - Assess patient's mobility; develop plan if impaired  - Assess patient's need for assistive devices and provide as appropriate  - Encourage maximum independence but intervene and supervise when necessary  - Involve family in performance of ADLs  - Assess for home care needs following discharge   - Consider OT consult to assist with ADL evaluation and planning for discharge  - Provide patient education as appropriate  Outcome: Progressing  Goal: Maintains/Returns to pre admission functional level  Description: INTERVENTIONS:  - Perform BMAT or MOVE assessment daily    - Set and communicate daily mobility goal to care team and patient/family/caregiver  - Collaborate with rehabilitation services on mobility goals if consulted  - Out of bed for toileting  - Record patient progress and toleration of activity level   Outcome: Progressing     Problem: Knowledge Deficit  Goal: Patient/family/caregiver demonstrates understanding of disease process, treatment plan, medications, and discharge instructions  Description: Complete learning assessment and assess knowledge base    Interventions:  - Provide teaching at level of understanding  - Provide teaching via preferred learning methods  Outcome: Progressing     Problem: DISCHARGE PLANNING  Goal: Discharge to home or other facility with appropriate resources  Description: INTERVENTIONS:  - Identify barriers to discharge w/patient and caregiver  - Arrange for needed discharge resources and transportation as appropriate  - Identify discharge learning needs (meds, wound care, etc )  - Arrange for interpretive services to assist at discharge as needed  - Refer to Case Management Department for coordinating discharge planning if the patient needs post-hospital services based on physician/advanced practitioner order or complex needs related to functional status, cognitive ability, or social support system  Outcome: Progressing     Problem: POSTPARTUM  Goal: Experiences normal postpartum course  Description: INTERVENTIONS:  - Monitor maternal vital signs  - Assess uterine involution and lochia  Outcome: Progressing  Goal: Appropriate maternal -  bonding  Description: INTERVENTIONS:  - Identify family support  - Assess for appropriate maternal/infant bonding   -Encourage maternal/infant bonding opportunities  - Referral to  or  as needed  Outcome: Progressing  Goal: Establishment of infant feeding pattern  Description: INTERVENTIONS:  - Assess breast/bottle feeding  - Refer to lactation as needed  Outcome: Progressing  Goal: Incision(s), wounds(s) or drain site(s) healing without S/S of infection  Description: INTERVENTIONS  - Assess and document dressing, incision, wound bed, drain sites and surrounding tissue  - Provide patient and family education  Outcome: Progressing

## 2022-11-17 VITALS
TEMPERATURE: 98.4 F | BODY MASS INDEX: 33.03 KG/M2 | SYSTOLIC BLOOD PRESSURE: 124 MMHG | HEIGHT: 69 IN | HEART RATE: 87 BPM | WEIGHT: 223 LBS | RESPIRATION RATE: 18 BRPM | OXYGEN SATURATION: 98 % | DIASTOLIC BLOOD PRESSURE: 76 MMHG

## 2022-11-17 RX ORDER — OXYCODONE HYDROCHLORIDE 5 MG/1
5 TABLET ORAL EVERY 6 HOURS PRN
Qty: 20 TABLET | Refills: 0 | Status: SHIPPED | OUTPATIENT
Start: 2022-11-17 | End: 2022-11-27

## 2022-11-17 RX ORDER — IBUPROFEN 600 MG/1
600 TABLET ORAL EVERY 6 HOURS PRN
Qty: 45 TABLET | Refills: 0 | Status: SHIPPED | OUTPATIENT
Start: 2022-11-17 | End: 2022-12-17

## 2022-11-17 RX ORDER — ACETAMINOPHEN 325 MG/1
650 TABLET ORAL EVERY 6 HOURS SCHEDULED
Qty: 10 TABLET | Refills: 0
Start: 2022-11-17 | End: 2022-11-19

## 2022-11-17 RX ORDER — DOCUSATE SODIUM 100 MG/1
100 CAPSULE, LIQUID FILLED ORAL 2 TIMES DAILY
Refills: 0
Start: 2022-11-17

## 2022-11-17 RX ADMIN — MEASLES, MUMPS, AND RUBELLA VIRUS VACCINE LIVE 0.5 ML: 1000; 12500; 1000 INJECTION, POWDER, LYOPHILIZED, FOR SUSPENSION SUBCUTANEOUS at 09:06

## 2022-11-17 RX ADMIN — OXYCODONE HYDROCHLORIDE 5 MG: 5 TABLET ORAL at 09:03

## 2022-11-17 RX ADMIN — OXYCODONE HYDROCHLORIDE 10 MG: 10 TABLET ORAL at 00:09

## 2022-11-17 RX ADMIN — IBUPROFEN 600 MG: 600 TABLET, FILM COATED ORAL at 11:38

## 2022-11-17 RX ADMIN — DOCUSATE SODIUM 100 MG: 100 CAPSULE, LIQUID FILLED ORAL at 09:03

## 2022-11-17 RX ADMIN — ACETAMINOPHEN 650 MG: 325 TABLET ORAL at 09:03

## 2022-11-17 RX ADMIN — ACETAMINOPHEN 650 MG: 325 TABLET ORAL at 03:05

## 2022-11-17 RX ADMIN — SERTRALINE 25 MG: 25 TABLET, FILM COATED ORAL at 09:05

## 2022-11-17 RX ADMIN — ENOXAPARIN SODIUM 40 MG: 40 INJECTION SUBCUTANEOUS at 09:04

## 2022-11-17 NOTE — PLAN OF CARE
Problem: PAIN - ADULT  Goal: Verbalizes/displays adequate comfort level or baseline comfort level  Description: Interventions:  - Encourage patient to monitor pain and request assistance  - Assess pain using appropriate pain scale  - Administer analgesics based on type and severity of pain and evaluate response  - Implement non-pharmacological measures as appropriate and evaluate response  - Consider cultural and social influences on pain and pain management  - Notify physician/advanced practitioner if interventions unsuccessful or patient reports new pain  Outcome: Adequate for Discharge     Problem: INFECTION - ADULT  Goal: Absence or prevention of progression during hospitalization  Description: INTERVENTIONS:  - Assess and monitor for signs and symptoms of infection  - Monitor lab/diagnostic results  - Monitor all insertion sites, i e  indwelling lines, tubes, and drains  - Monitor endotracheal if appropriate and nasal secretions for changes in amount and color  - [place] appropriate cooling/warming therapies per order  - Administer medications as ordered  - Instruct and encourage patient and family to use good hand hygiene technique  - Identify and instruct in appropriate isolation precautions for identified infection/condition  Outcome: Adequate for Discharge     Problem: SAFETY ADULT  Goal: Patient will remain free of falls  Description: INTERVENTIONS:  - Educate patient/family on patient safety including physical limitations  - Instruct patient to call for assistance with activity   - Consult OT/PT to assist with strengthening/mobility   - Keep Call bell within reach  - Keep bed low and locked with side rails adjusted as appropriate  - Keep care items and personal belongings within reach  - Initiate and maintain comfort rounds  - Make Fall Risk Sign visible to staff    Outcome: Adequate for Discharge  Goal: Maintain or return to baseline ADL function  Description: INTERVENTIONS:  -  Assess patient's ability to carry out ADLs; assess patient's baseline for ADL function and identify physical deficits which impact ability to perform ADLs (bathing, care of mouth/teeth, toileting, grooming, dressing, etc )  - Assess/evaluate cause of self-care deficits   - Assess range of motion  - Assess patient's mobility; develop plan if impaired  - Assess patient's need for assistive devices and provide as appropriate  - Encourage maximum independence but intervene and supervise when necessary  - Involve family in performance of ADLs  - Assess for home care needs following discharge   - Consider OT consult to assist with ADL evaluation and planning for discharge  - Provide patient education as appropriate  Outcome: Adequate for Discharge  Goal: Maintains/Returns to pre admission functional level  Description: INTERVENTIONS:  - Perform BMAT or MOVE assessment daily    - Set and communicate daily mobility goal to care team and patient/family/caregiver  - Collaborate with rehabilitation services on mobility goals if consulted    - Out of bed for toileting  - Record patient progress and toleration of activity level   Outcome: Adequate for Discharge     Problem: Knowledge Deficit  Goal: Patient/family/caregiver demonstrates understanding of disease process, treatment plan, medications, and discharge instructions  Description: Complete learning assessment and assess knowledge base    Interventions:  - Provide teaching at level of understanding  - Provide teaching via preferred learning methods  Outcome: Adequate for Discharge     Problem: DISCHARGE PLANNING  Goal: Discharge to home or other facility with appropriate resources  Description: INTERVENTIONS:  - Identify barriers to discharge w/patient and caregiver  - Arrange for needed discharge resources and transportation as appropriate  - Identify discharge learning needs (meds, wound care, etc )  - Arrange for interpretive services to assist at discharge as needed  - Refer to Case Management Department for coordinating discharge planning if the patient needs post-hospital services based on physician/advanced practitioner order or complex needs related to functional status, cognitive ability, or social support system  Outcome: Adequate for Discharge     Problem: POSTPARTUM  Goal: Experiences normal postpartum course  Description: INTERVENTIONS:  - Monitor maternal vital signs  - Assess uterine involution and lochia  Outcome: Adequate for Discharge  Goal: Appropriate maternal -  bonding  Description: INTERVENTIONS:  - Identify family support  - Assess for appropriate maternal/infant bonding   -Encourage maternal/infant bonding opportunities  - Referral to  or  as needed  Outcome: Adequate for Discharge  Goal: Establishment of infant feeding pattern  Description: INTERVENTIONS:  - Assess breast/bottle feeding  - Refer to lactation as needed  Outcome: Adequate for Discharge  Goal: Incision(s), wounds(s) or drain site(s) healing without S/S of infection  Description: INTERVENTIONS  - Assess and document dressing, incision, wound bed, drain sites and surrounding tissue  - Provide patient and family education  - Perform skin care/dressing changes every day  Outcome: Adequate for Discharge

## 2022-11-17 NOTE — PLAN OF CARE
Problem: PAIN - ADULT  Goal: Verbalizes/displays adequate comfort level or baseline comfort level  Description: Interventions:  - Encourage patient to monitor pain and request assistance  - Assess pain using appropriate pain scale  - Administer analgesics based on type and severity of pain and evaluate response  - Implement non-pharmacological measures as appropriate and evaluate response  - Consider cultural and social influences on pain and pain management  - Notify physician/advanced practitioner if interventions unsuccessful or patient reports new pain  Outcome: Progressing     Problem: INFECTION - ADULT  Goal: Absence or prevention of progression during hospitalization  Description: INTERVENTIONS:  - Assess and monitor for signs and symptoms of infection  - Monitor lab/diagnostic results  - Monitor all insertion sites, i e  indwelling lines, tubes, and drains  - Monitor endotracheal if appropriate and nasal secretions for changes in amount and color  - [place] appropriate cooling/warming therapies per order  - Administer medications as ordered  - Instruct and encourage patient and family to use good hand hygiene technique  - Identify and instruct in appropriate isolation precautions for identified infection/condition  Outcome: Progressing     Problem: SAFETY ADULT  Goal: Patient will remain free of falls  Description: INTERVENTIONS:  - Educate patient/family on patient safety including physical limitations  - Instruct patient to call for assistance with activity   - Consult OT/PT to assist with strengthening/mobility   - Keep Call bell within reach  - Keep bed low and locked with side rails adjusted as appropriate  - Keep care items and personal belongings within reach  - Initiate and maintain comfort rounds  - Make Fall Risk Sign visible to staff  - Offer Toileting every  Hours, in advance of need  - Initiate/Maintain alarm  - Obtain necessary fall risk management equipment:   - Apply yellow socks and bracelet for high fall risk patients  - Consider moving patient to room near nurses station  Outcome: Progressing  Goal: Maintain or return to baseline ADL function  Description: INTERVENTIONS:  -  Assess patient's ability to carry out ADLs; assess patient's baseline for ADL function and identify physical deficits which impact ability to perform ADLs (bathing, care of mouth/teeth, toileting, grooming, dressing, etc )  - Assess/evaluate cause of self-care deficits   - Assess range of motion  - Assess patient's mobility; develop plan if impaired  - Assess patient's need for assistive devices and provide as appropriate  - Encourage maximum independence but intervene and supervise when necessary  - Involve family in performance of ADLs  - Assess for home care needs following discharge   - Consider OT consult to assist with ADL evaluation and planning for discharge  - Provide patient education as appropriate  Outcome: Progressing  Goal: Maintains/Returns to pre admission functional level  Description: INTERVENTIONS:  - Perform BMAT or MOVE assessment daily    - Set and communicate daily mobility goal to care team and patient/family/caregiver  - Collaborate with rehabilitation services on mobility goals if consulted  - Perform Range of Motion  times a day  - Reposition patient every  hours  - Dangle patient  times a day  - Stand patient  times a day  - Ambulate patient  times a day  - Out of bed to chair  times a day   - Out of bed for meals times a day  - Out of bed for toileting  - Record patient progress and toleration of activity level   Outcome: Progressing     Problem: Knowledge Deficit  Goal: Patient/family/caregiver demonstrates understanding of disease process, treatment plan, medications, and discharge instructions  Description: Complete learning assessment and assess knowledge base    Interventions:  - Provide teaching at level of understanding  - Provide teaching via preferred learning methods  Outcome: Progressing     Problem: DISCHARGE PLANNING  Goal: Discharge to home or other facility with appropriate resources  Description: INTERVENTIONS:  - Identify barriers to discharge w/patient and caregiver  - Arrange for needed discharge resources and transportation as appropriate  - Identify discharge learning needs (meds, wound care, etc )  - Arrange for interpretive services to assist at discharge as needed  - Refer to Case Management Department for coordinating discharge planning if the patient needs post-hospital services based on physician/advanced practitioner order or complex needs related to functional status, cognitive ability, or social support system  Outcome: Progressing     Problem: POSTPARTUM  Goal: Experiences normal postpartum course  Description: INTERVENTIONS:  - Monitor maternal vital signs  - Assess uterine involution and lochia  Outcome: Progressing  Goal: Appropriate maternal -  bonding  Description: INTERVENTIONS:  - Identify family support  - Assess for appropriate maternal/infant bonding   -Encourage maternal/infant bonding opportunities  - Referral to  or  as needed  Outcome: Progressing  Goal: Establishment of infant feeding pattern  Description: INTERVENTIONS:  - Assess breast/bottle feeding  - Refer to lactation as needed  Outcome: Progressing  Goal: Incision(s), wounds(s) or drain site(s) healing without S/S of infection  Description: INTERVENTIONS  - Assess and document dressing, incision, wound bed, drain sites and surrounding tissue  - Provide patient and family education  - Perform skin care/dressing changes every   Outcome: Progressing

## 2022-11-17 NOTE — PROGRESS NOTES
Progress Note - OB/GYN  Vearl Cl Nagel 29 y o  female MRN: [id number]  Unit/Bed#: -01 Encounter: [id number]    Assessment and Plan     Juliette Nagel is a patient of: [facility]   She is POD# 3 s/p 1LTCS for breech  Recovering well and is stable       Chronic hypertension  Assessment & Plan  PEC labs wnl, P/Cr 1 20  Systolic (08JKI), WF , Min:113 , HLU:126     Diastolic (86VUJ), VSZ:73, Min:69, Max:82  Asymptomatic for Pre-E at this time  Continue monitoring BPs    * S/P  section  Assessment & Plan  , Hgb 11 4 --> post op Hgb 9 3  Voiding spontaneously  Pain: Tylenol and motrin scheduled, joanna 5/10 PRN    Encouraged ambulation  FEN: Tolerating regular diet  DVT ppx: SCDs and  Lovenox 40mg qD  Passing flatus   Incision C/D/I   GBS positive  Rh positive          Disposition    - Anticipate discharge home today vs tomorrow      Subjective/Objective     Chief Complaint: Postoperative State     Subjective:    Juliette Nagel is s/p 1LTCS  She is POD# 3  She has no current complaints  Pain is controlled but a little worse on patient's right side  Patient is currently voiding  She is ambulating  Patient is currently passing flatus and has had no bowel movement  She is tolerating PO, and denies nausea or vomitting  Patient denies fever, chills, chest pain, shortness of breath, or calf tenderness  Lochia is normal  Her incision is C/D/I  She is recovering well and is stable         Vitals:   /69 (BP Location: Left arm)   Pulse 94   Temp 98 °F (36 7 °C) (Oral)   Resp 18   Ht 5' 9" (1 753 m)   Wt 101 kg (223 lb)   LMP 2022   SpO2 98%   Breastfeeding Yes   BMI 32 93 kg/m²     No intake or output data in the 24 hours ending 22 0622    Invasive Devices     None                 Physical Exam:   GEN: Juliette Nagel appears well, alert and oriented x 3, pleasant and cooperative   CARDIO: RRR, no murmurs or rubs  RESP:  CTAB, no wheezes or rales  ABDOMEN: soft, no tenderness, no distention, fundus firm, Incision C/D/I  EXTREMITIES: SCDs on, Negative Anastacio's sign bilaterally      Labs:     Hemoglobin   Date Value Ref Range Status   11/15/2022 9 3 (L) 11 5 - 15 4 g/dL Final   11/14/2022 11 4 (L) 11 5 - 15 4 g/dL Final   01/13/2018 13 8 11 7 - 15 5 g/dL Final   12/01/2016 13 6 11 7 - 15 5 g/dL Final     WBC   Date Value Ref Range Status   11/15/2022 13 76 (H) 4 31 - 10 16 Thousand/uL Final   11/14/2022 7 75 4 31 - 10 16 Thousand/uL Final   01/13/2018 4 3 3 8 - 10 8 Thousand/uL Final   12/01/2016 4 6 3 8 - 10 8 Thousand/uL Final     Platelets   Date Value Ref Range Status   11/15/2022 175 149 - 390 Thousands/uL Final   11/14/2022 178 149 - 390 Thousands/uL Final   01/13/2018 350 140 - 400 Thousand/uL Final   12/01/2016 270 140 - 400 Thousand/uL Final     Creatinine   Date Value Ref Range Status   11/14/2022 0 55 (L) 0 60 - 1 30 mg/dL Final     Comment:     Standardized to IDMS reference method   11/02/2022 0 56 (L) 0 60 - 1 30 mg/dL Final     Comment:     Standardized to IDMS reference method   01/13/2018 0 93 0 50 - 1 10 mg/dL Final   12/01/2016 0 78 0 50 - 1 10 mg/dL Final     AST   Date Value Ref Range Status   11/14/2022 14 13 - 39 U/L Final     Comment:     Specimen collection should occur prior to Sulfasalazine administration due to the potential for falsely depressed results  11/02/2022 13 13 - 39 U/L Final     Comment:     Specimen collection should occur prior to Sulfasalazine administration due to the potential for falsely depressed results  01/13/2018 19 10 - 30 U/L Final   12/01/2016 15 10 - 30 U/L Final   12/01/2016 15 10 - 30 U/L Final     ALT   Date Value Ref Range Status   11/14/2022 10 7 - 52 U/L Final     Comment:     Specimen collection should occur prior to Sulfasalazine administration due to the potential for falsely depressed results      11/02/2022 10 7 - 52 U/L Final     Comment:     Specimen collection should occur prior to Sulfasalazine administration due to the potential for falsely depressed results      01/13/2018 14 6 - 29 U/L Final     Comment:     Performed at: [address],[facility], [address] [place] [id number]     12/01/2016 11 6 - 29 U/L Final     Comment:     [facility]formed [facility]: [id number] [facility], [address] [place] [id number]     12/01/2016 11 6 - 29 U/L Final     Comment:     Performed at: [address],[facility], [address] [place] [address] U  16             Grace Suh MD  11/17/2022  6:22 AM

## 2022-11-17 NOTE — LACTATION NOTE
This note was copied from a baby's chart  Discharge Lactation: answered questions about timing of feeds, signs of satiation  Encouragement with feeding on demand and diaper output  Wound healing on nipple is improving  Enc  To schedule with baby and me    Enc  To call lactation see a latch  - Start feedings on breast that last feeding ended   - allow no more than 3 hours between breast feeding sessions   - time between feedings is counted from the beginning of the first feed to the beginning of the next feeding session    Reviewed early signs of hunger, including tensing of hands and shoulders - no need to wait for open eyes  Crying is a late hunger sign  If baby is crying, soothe baby first and then attempt to latch  Reviewed normal sucking patterns: transition from stimulation to nutritive to release or non-nutritive  The goal is to see and hear lots of swallowing  Reviewed normal nursing pattern: infant could latch on one breast up to 30 minutes or until releases on own  Signs of satiation is open hand with fingers that do not grab your finger  Discussed difference in sensation of non-nutritive v nutritive sucking    Nurse on demand: when baby gives hunger cues; when your breasts feel full, or at least every 3 hours during the day and every 5 hours at night counting from the beginning of one feeding to the beginning of the next; which ever comes first  When sucking and swallowing slow, gently compress the breast to restart flow  If active suck-swallow does not restart, gently remove the baby and offer the other breast; offering up to "four" breasts per feeding  Mom is encouraged to meet early feeding cues, allow for non-nutritive suck, use breast compressions, and monitor baby's output  Mom wants to review feeding logs to verify if baby is meeting daily output of wet and soiled diapers       To help your nipples heal, in addition to paying close attention to latch and positioning, apply protective ointment after feeding or pumping and cover with an occlusive dressing  Provided education on growth spurts, when to introduce bottles; paced bottle feeding, and non-nutritive suck at the breast  Provided education on Signs of satiation  Encouraged to call lactation to observe a latch prior to discharge for reassurance  Encouraged to call baby and me with any questions and closely monitor output  Met with mother to go over discharge breastfeeding booklet including the feeding log  Emphasized 8 or more (12) feedings in a 24 hour period, what to expect for the number of diapers per day of life and the progression of properties of the  stooling pattern  Reviewed breastfeeding and your lifestyle, storage and preparation of breast milk, how to keep you breast pump clean, the employed breastfeeding mother and paced bottle feeding handouts  Booklet included Breastfeeding Resources for after discharge including access to the number for the [address]

## 2022-11-18 ENCOUNTER — TELEPHONE (OUTPATIENT)
Dept: OBGYN CLINIC | Facility: CLINIC | Age: 32
End: 2022-11-18

## 2022-11-21 ENCOUNTER — TELEPHONE (OUTPATIENT)
Dept: POSTPARTUM | Facility: CLINIC | Age: 32
End: 2022-11-21

## 2022-11-21 NOTE — TELEPHONE ENCOUNTER
Spoke with Katlyn Alegria (7d) is not nursing well - they attempt to latch & she falls off most times  Delores Baker is pumping to give by bottle  Appt is set for 11/25 here - she is looking for any tips / recommendations till she can be seen

## 2022-11-21 NOTE — TELEPHONE ENCOUNTER
Celestino Jiménez has struggled to latch since she was born  Things are getting worse  Juliette's nipples are sore and damaged  Most of the time Juliette is pumping and bottle feeding  We discussed positioning for an effective latch, effective pumping and hand expression, moist wound care and paced bottle feeding  I sent Juliette instructions through [address]  I encouraged her to call back with any additional questions or concerns

## 2022-11-21 NOTE — PROGRESS NOTES
S: Patient presents to the office today for postpartum incision check of LTCS done on 11/14/22 for breech presentation and decreased fetal movement  Incision appears clean, dry, and intact  She denies incisional pain, swelling, erythema, tenderness, or drainage  Denies any bowel and bladder complaints   O:   Vitals:    11/22/22 1011   BP: 120/76       Physical Exam  Vitals and nursing note reviewed  Constitutional:       Appearance: Normal appearance  She is normal weight  HENT:      Head: Normocephalic  Cardiovascular:      Rate and Rhythm: Normal rate and regular rhythm  Heart sounds: Normal heart sounds  No murmur heard  Pulmonary:      Effort: Pulmonary effort is normal       Breath sounds: Normal breath sounds  Abdominal:      General: Abdomen is flat  Palpations: Abdomen is soft  Tenderness: There is no right CVA tenderness or left CVA tenderness  Musculoskeletal:         General: No tenderness  Normal range of motion  Cervical back: Normal range of motion  Right lower leg: No edema  Left lower leg: No edema  Comments: B/l (-) homans   Skin:     General: Skin is warm and dry  Neurological:      Mental Status: She is alert and oriented to person, place, and time  Psychiatric:         Mood and Affect: Mood normal          Behavior: Behavior normal          Thought Content: Thought content normal          Judgment: Judgment normal        A/P:  -Reviewed LTCS precautions with the patient regarding strenuous activity and safe-to-drive    -Discussed lactation issues and verified that she had an appointment with the [facility] "baby blues" and fluctuating hormones during the postpartum period with her  Educated her to get rest when she can and allow periods of self-care time   Advised her to the call the office sooner if symptoms of 'baby blues' are worsening or seek immediate medical attention if feelings of self-harm or HI   - Follow-up for postpartum visit in 2 weeks

## 2022-11-22 ENCOUNTER — POSTPARTUM VISIT (OUTPATIENT)
Dept: OBGYN CLINIC | Facility: CLINIC | Age: 32
End: 2022-11-22

## 2022-11-22 VITALS
HEIGHT: 69 IN | WEIGHT: 205 LBS | BODY MASS INDEX: 30.36 KG/M2 | SYSTOLIC BLOOD PRESSURE: 120 MMHG | DIASTOLIC BLOOD PRESSURE: 76 MMHG

## 2022-11-22 DIAGNOSIS — Z98.890 POST-OPERATIVE STATE: Primary | ICD-10-CM

## 2022-11-25 ENCOUNTER — OFFICE VISIT (OUTPATIENT)
Dept: POSTPARTUM | Facility: CLINIC | Age: 32
End: 2022-11-25

## 2022-11-25 VITALS — HEART RATE: 100 BPM | DIASTOLIC BLOOD PRESSURE: 80 MMHG | SYSTOLIC BLOOD PRESSURE: 118 MMHG

## 2022-11-25 DIAGNOSIS — Z71.89 ENCOUNTER FOR BREAST FEEDING COUNSELING: Primary | ICD-10-CM

## 2022-11-25 NOTE — PATIENT INSTRUCTIONS
Continue to feed Amy on demand at least q3 hours, at least 8-12 times in 24 hours,  When offering the breast work on achieving an optimal latch by positioning baby with ear, shoulder and  hip in line, baby's arms open, not in between mom and baby, nose to nipple, hand at base of head/neck, infant up at chest level and starting to feed infant with nose arriving at the nipple  Then, using areolar compression to achieve a deep latch that is comfortable and exchanges optimum amounts of milk  When baby slows at the breast you may offer ibis breast compressions to increase flow  Offer both breasts wit each feeding  You may offer up to 4 breasts per feeding, or "switch" nursing, latch baby, when suckling slows offer gentle breast compressions, once no longer actively nursing on that breast burp to stimulate, then switch to the other side, repeat up to 2 more times as needed  Offer the breast as desired and keep the experiences at the breast pleasant  If not feeding at the breast offer a bottle of expressed milk and/or formula as needed, offering 2-3 oz per feeding by paced bottle feeding  You may bottle feed skin to skin  If not latching be sure to pump both breast, breasts should be expressed by nursing or pumping at least 8-12 times in 24 hours, at once over night  Refer to the hands on pumping video and hand express after pumping  You may cycle the pump from let down mode to expression once milk flow increases, once flow decreases you may go back to let down mode and go though the cycle again, up to 3 times in a pumping session  Sessions should last no longer than 20 min  Check flange sizes and consider adjusting down  The nipple should move freely in and out of the flange comfortably  Follow up in 2 weeks or as needed  Call pediatrician for weight check early next week  Call with any questions or concerns

## 2022-11-25 NOTE — PROGRESS NOTES
INITIAL BREAST FEEDING EVALUATION    Informant/Relationship: Kobei/self/ Amy's mom    Discussion of General Lactation Issues: Latched in the hospital but was sleepy, nipples were damaged  Mom started pumping and bottle feeding due to weight gain concerns  She will still occasionally come to the breast but mom doesn't think she gets enough as she falls asleep  Infant is 6days old today          History:  Fertility Problem:no  Breast changes:yes - larger, darker and large areola, sore  : no, c/s for breech  Full term:37 4 delivered for decreased fetal movement and maternal HTN   labor:no  First nursing/attempt < 1 hour after birth:yes - yes latched  Skin to skin following delivery:about an hour after delivery   Breast changes after delivery:yes - larger, full, heavy, leaking milk about 3week old   Rooming in (infant in room with mother with exception of procedures, eg  Circumcision: no  Blood sugar issues:no  NICU stay:no  Jaundice:elevated bilirubin  Phototherapy:no  Supplement given: (list supplement and method used as well as reason(s):yes - similac started at the pediatrician     Past Medical History:   Diagnosis Date   • Abnormal weight gain     last assessed: 2016   • Anxiety     last assessed: 2016   • Depression    • High risk HPV infection     (+) type 16 (-) type 18 (-) other HRHPV   • Miscarriage          Current Outpatient Medications:   •  Docosahexaenoic Acid (DHA PO), Take by mouth, Disp: , Rfl:   •  Prenatal Vit-Fe Fumarate-FA (PRENATAL 19 PO), Take by mouth, Disp: , Rfl:   •  sertraline (ZOLOFT) 25 mg tablet, Take 25 mg by mouth in the morning , Disp: , Rfl:   •  docusate sodium (COLACE) 100 mg capsule, Take 1 capsule (100 mg total) by mouth 2 (two) times a day (Patient not taking: Reported on 2022), Disp: , Rfl: 0  •  ibuprofen (MOTRIN) 600 mg tablet, Take 1 tablet (600 mg total) by mouth every 6 (six) hours as needed for moderate pain (cramping) (Patient not taking: Reported on 11/22/2022), Disp: 45 tablet, Rfl: 0  •  oxyCODONE (Roxicodone) 5 immediate release tablet, Take 1 tablet (5 mg total) by mouth every 6 (six) hours as needed for severe pain for up to 10 days Max Daily Amount: 20 mg (Patient not taking: Reported on 11/22/2022), Disp: 20 tablet, Rfl: 0    Allergies   Allergen Reactions   • Hyoscyamine Hallucinations     Patient states she had hallucinations  • Amoxicillin Rash     Other reaction(s): AMOXICILLIN TRIHYDRATE (AMOXICILLIN)   • Gluten Meal - Food Allergy GI Intolerance       Social History     Substance and Sexual Activity   Drug Use No       Social History     Interval Breastfeeding History:    Frequency of breast feeding: once a day but is sleey  Does mother feel breastfeeding is effective: If no, explain: sleepy, not gaining weight  Does infant appear satisfied after nursing:No  Stooling pattern normal: Yes  Urinating frequently:Yes  Using shield or shells: No    Alternative/Artificial Feedings:   Bottle: Yes, paced bottle feeding with slow flow nipple, NUK and Medela             Formula Type: Similac                   2-2 5oz in the bottle total, may be breast milk and or formula   Gets about 5oz of formula a day             Frequency Q 3-3 5  Hr between feedings  Elimination Problems: No      Equipment:    Pump            Type: Spectra S2             Frequency of Use: q3 2-3 hours yields 1 5-2 5 oz per session, both breasts total       Equipment Problems: no    Mom:  Breast: Normal, round shape, medium to large size, leaking milk  Nipple Assessment in General: Normal: elongated/eraser, no discoloration and no damage noted  Mother's Awareness of Feeding Cues                 Recognizes:  Yes                  Verbalizes: Yes  Support System: FOB, extended family  History of Breastfeeding: none   Changes/Stressors/Violence:not alone for DV, stressed over feeding baby  Concerns/Goals: Would like to make feeding at the breast more effective, but is open to continuing to pump, wants to give as much breast milk as possible     Problems with Mom: perceived low milk supply     Physical Exam  Constitutional:       Appearance: Normal appearance  HENT:      Head: Normocephalic and atraumatic  Cardiovascular:      Rate and Rhythm: Normal rate and regular rhythm  Pulses: Normal pulses  Heart sounds: Normal heart sounds  Pulmonary:      Effort: Pulmonary effort is normal       Breath sounds: Normal breath sounds  Musculoskeletal:         General: Normal range of motion  Cervical back: Normal range of motion  Neurological:      General: No focal deficit present  Mental Status: She is alert and oriented to person, place, and time  Skin:     General: Skin is warm and dry  Psychiatric:         Mood and Affect: Mood normal          Behavior: Behavior normal          Thought Content: Thought content normal          Judgment: Judgment normal          Infant:  Behaviors: Alert  Color: Pink, slight jaundice  Birth weight: 3015g   Current weight: 2855g    Problems with infant: slow weight gain      General Appearance:  Alert, active, no distress                            Head:  Normocephalic, AFOF, wide anterior fontanel, sutures overriding and                              Eyes:   Conjunctiva clear, no drainage                            Ears:   Normally placed, no anomolies                           Nose:   Septum intact, no drainage or erythema                          Mouth:  No lesions, membranes pink and moist, rogelio perals noted on roof of mouth, tongue noted to form a slight "w" shape when baby cries  Slight v to tongue tip when extended  Neck:  Supple, symmetrical, trachea midline                Respiratory:  No grunting, flaring, retractions, breath sounds clear and equal           Cardiovascular:  Regular rate and rhythm  No murmur  Adequate perfusion/capillary refill   Femoral pulse present                  Abdomen:    Soft, non-tender, no masses, bowel sounds present          Genitourinary:  Normal female genitalia, anus patent                         Spine:   No abnormalities noted       Musculoskeletal:   Full range of motion         Skin/Hair/Nails:   Skin warm, dry, and intact, no abnormal dyspigmentation or lesions, mild red rash on buttocks               Neurologic:   No abnormal movement, tone appropriate for gestational age    Pearl Assessment for Lingual Frenulum Function    Appearance Items Function Items   Appearance of tongue when lifted  0: Heart- or V-shaped   Lateralization  1: Body of tongue but not tongue tip   Elasticity of frenulum  1: Moderately elastic   Lift of tongue  0: Tip stays at lower alveloar ridge or rises to mid-mouth only with jaw closure     Length of lingual frenulum when tongue lifted  lingual frenulum length: 1: 1 cm     Extension of tongue  2: Tip over lower lip   Attachment of lingual frenulum to tongue  2: Posterior to tip   Spread of anterior tongue  2: Complete   Attachment of lingual frenulum to inferior alveolar ridge  2: Attached to floor of mouth or well below ridge Cupping  2: Entire edge, firm cup   Ankyloglossia Grading:  Class I: mild, 12-16 mm  Class II: moderate, 8-11 mm  Class III: severe, 3-7 mm  ClassIV: complete, less than 3 mm Peristalsis  2: Complete, anterior to posterior       SCORE:    Appearance: 6 (<8=ankyloglossia)  Function: 11 (<11=ankyloglossia) Snapback  2: None        Latch:  Efficiency:               Lips Flanged: Yes              Depth of latch: Moderate to Deep              Audible Swallow:  Yes              Visible Milk: Yes              Wide Open/ Asymmetrical: Yes              Suck Swallow Cycle: Breathing: unlabored, Coordinated: yes  Nipple Assessment after latch: slanted   Latch Problems: Mom reports a comfortable feeding, baby took a few attempts to latch initially to the first breast but with repositioning took the breast easily and maintained a nutritive sucking pattern on both breasts  Position:  Infant's Ergonomics/Body               Body Alignment: Yes               Head Supported: Yes               Close to Mom's body/ Lifted/ Supported: Yes, after education               Mom's Ergonomics/Body: Yes, after education                            Supported: Yes                           Sitting Back: Yes, after education                           Brings Baby to her breast: Yes, after education   Positioning Problems: baby was initially high on the breast, resulting in a shallow latch      Handouts:   Paced bottle feeding, Hands on pumping, Hand expression, Latch Check List and breast compressions    Education:  Reviewed Latch and positioning: Worked on positioning infant up at chest level and starting to feed infant with nose arriving at the nipple  Then, using areolar compression to achieve a deep latch that is comfortable and exchanges optimum amounts of milk  I offered suggestions on positioning, for a more optimal latch, showed mom proper positioning, ear, shoulder hip in line, baby's arms open, not in between mom and baby, nose to nipple, hand at base of head/neck  How to break latch with a clean finger  How to differentiate between nutritive and non-nutrative sucking   When baby slows at the breast you may offer ibis breast compressions to increase flow  Offer both breasts wit each feeding  You may offer up to 4 breasts per feeding, or "switch" nursing, latch baby, when suckling slows offer gentle breast compressions, once no longer actively nursing on that breast burp to stimulate, then switch to the other side, repeat up to 2 more times as needed       Reviewed Frequency/Supply & Demand: continue to feed on demand, at least q3 hours, at least 8-12 times in 24 hours, spend lots of time skin to skin  Reviewed Infant:Cues and varied States of Awareness  Reviewed Infant Elimination: appropriate expected output for age  Reviewed Alternative/Artificial Feedings: paced bottle feeding with slow flow nipple may do so skin to skin  Reviewed Mom/Breast care: hands on pumping, hand expression  Reviewed Equipment: You may cycle the pump from let down mode to expression once milk flow increases, once flow decreases you may go back to let down mode and go though the cycle again, up to 3 times in a pumping session  Sessions should last no longer than 20 min  Check flange sizes and consider adjusting if needed  The nipple should move freely in and out of the flange comfortably  Plan:  Continue to feed Amy on demand at least q3 hours, at least 8-12 times in 24 hours  Offer the breast as desired, keeping the experiences at the breast pleasant  When ofering the the breast work on optimal latch ans positioning,  For maximal comfort and milk transfer  May offer breast compressions as needed and switch nursing as needed  If not feeding at the breast offer a bottle of expressed milk and/or formula as needed  Offering 2-3 oz per feeding by paced bottle feeding  If not latching be sure to pump both breast, breasts should be expressed by nursing or pumping at least 8-12 times in 24 hours  Consider decreasing flange size  Follow up in 2 weeks or as needed  Call pediatrician for weight check early next week  I have spent 90 minutes with Patient and family today in which greater than 50% of this time was spent in counseling/coordination of care regarding Patient and family education

## 2022-11-28 NOTE — PROGRESS NOTES
I have reviewed the notes, assessments, and/or procedures performed by Alisson Fernández RN, IBCLC, I concur with her/his documentation of Vandana Medina MD 11/27/22

## 2022-12-02 NOTE — PROGRESS NOTES
OB POSTPARTUM VISIT PROGRESS NOTE  Date of Encounter: 2022    Speedy Nagel    : 1990  (29 y o )  MR: [id number]    Ibis   Grey Mo is in for her postpartum visit  She is now   She delivered by primary  section  She's generally doing well, denies current pain or bleeding issues, and has no significant depression issues  EPDS: 9  She reports that at baseline she is mildly anxious and feels that she has occasional baby blues since the birth, but no feelings/thoughts that are consuming/overwhelming  Denies SI/HI  She is breastfeeding and pumping with formula supplementation  We discussed all appropriate contraceptive options and she chooses the Mic[facility] OCP  Last PAP: 22 NILM/HPV+16; redone today for reevaluation  Objective   EXAM:  GENERAL: alert, well appearing, and in no distress and oriented to person, place, and time  VITALS: Blood pressure 118/74, height 5' 9" (1 753 m), weight 93 4 kg (206 lb), last menstrual period 2022, currently breastfeeding  BMI: Body mass index is 30 42 kg/m²  NECK/THYROID: Other thyroid normal  HEART: S1/S2 auscultated with regular rhythm and rate  LUNGS: Clear to auscultation  BACK: Normal spine, no CVAT  BREASTS: Declined  ABDOMEN: Regular  EXTREMITIES: All normal  Anastacio's sign negative bilaterally  PELVIC:   VULVA: Nml EGBUS  VAGINA: Discharge, brownish and spotting noted      CERVIX: Normal, no discharge  Spotting noted  UTERUS:  [address] [employer]  Nontender  RIGHT ADNEXUM: Nontender, no mass  LEFT ADNEXUM: Nontender, no mass  RECTAL: Deferred  Assessment/Plan   Problem List Items Addressed This Visit        Other    Encounter for postpartum visit - Primary     Reviewed PP depression and current therapy of Zoloft 25mg PO daily  Discussed with the patient her EPDS score and reviewed other therapeutic avenues if needed  Reviewed Baby and Me information  She declines any further treatment options at this time  Discussed activity level, including sexual intercourse, hot baths, driving, lifting, and exercise  Advised patient to refrain from sexual intercourse until 6 weeks or healed  Educated patient to wait until her incision is fully healed prior to hot baths  Advised her to increase activity level and driving when she feels safe to do so; listen to her body to prevent injury  Relevant Orders    Liquid-based pap, screening    Encounter for oral contraception initial prescription     Pt desires BC  I thoroughly r/w pt all available options for [facility] including OCPs, patch, ring, Depo Provera, IUDs, Nexplanon and condoms  Aware of need for condoms for continued STD protection regardless of type of BC she chooses  Aware of risks and benefits and usage of each form of BC  Aware of start up, back up, etc     Discussed effects of estrogen with breastfeeding in addition to the Depo Provera shot having a warning regarding decreased milk supply  She does not wish to have any medication that effects her milk supply  Rx for minipill and reviewed the medication usage along with precautions of taking this pill every day at the same time every day to reduce occurrence of break-through bleeding and increase efficacy of contraception           Relevant Medications    norethindrone (Ortho [facility]) 0 35 MG tablet       ABIGAIL Baez

## 2022-12-06 ENCOUNTER — POSTPARTUM VISIT (OUTPATIENT)
Dept: OBGYN CLINIC | Facility: CLINIC | Age: 32
End: 2022-12-06

## 2022-12-06 VITALS
DIASTOLIC BLOOD PRESSURE: 74 MMHG | BODY MASS INDEX: 30.51 KG/M2 | HEIGHT: 69 IN | WEIGHT: 206 LBS | SYSTOLIC BLOOD PRESSURE: 118 MMHG

## 2022-12-06 DIAGNOSIS — Z30.011 ENCOUNTER FOR ORAL CONTRACEPTION INITIAL PRESCRIPTION: ICD-10-CM

## 2022-12-06 RX ORDER — ACETAMINOPHEN AND CODEINE PHOSPHATE 120; 12 MG/5ML; MG/5ML
1 SOLUTION ORAL DAILY
Qty: 28 TABLET | Refills: 3 | Status: SHIPPED | OUTPATIENT
Start: 2022-12-06 | End: 2023-03-28

## 2022-12-06 NOTE — LETTER
2022     Patient: Manny Nagel  YOB: 1990  Date of Visit: 2022      To Whom it May Concern:    Sophie Espana is under my professional care  Stephanie Lawson was seen in my office on 2022  Stephanie Lawson may return to work on 2023  She delivered on 2022 by  section  If you have any questions or concerns, please don't hesitate to call           Sincerely,          ABIGAIL Gutiérrez        CC: No Recipients

## 2022-12-07 PROBLEM — Z30.011 ENCOUNTER FOR ORAL CONTRACEPTION INITIAL PRESCRIPTION: Status: ACTIVE | Noted: 2022-12-07

## 2022-12-07 NOTE — ASSESSMENT & PLAN NOTE
Pt desires BC  I thoroughly r/w pt all available options for [facility] including OCPs, patch, ring, Depo Provera, IUDs, Nexplanon and condoms  Aware of need for condoms for continued STD protection regardless of type of BC she chooses  Aware of risks and benefits and usage of each form of BC  Aware of start up, back up, etc     Discussed effects of estrogen with breastfeeding in addition to the Depo Provera shot having a warning regarding decreased milk supply  She does not wish to have any medication that effects her milk supply  Rx for minipill and reviewed the medication usage along with precautions of taking this pill every day at the same time every day to reduce occurrence of break-through bleeding and increase efficacy of contraception

## 2022-12-07 NOTE — ASSESSMENT & PLAN NOTE
Reviewed PP depression and current therapy of Zoloft 25mg PO daily  Discussed with the patient her EPDS score and reviewed other therapeutic avenues if needed  Reviewed Baby and Me information  She declines any further treatment options at this time  Discussed activity level, including sexual intercourse, hot baths, driving, lifting, and exercise  Advised patient to refrain from sexual intercourse until 6 weeks or healed  Educated patient to wait until her incision is fully healed prior to hot baths  Advised her to increase activity level and driving when she feels safe to do so; listen to her body to prevent injury

## 2022-12-08 LAB
HPV HR 12 DNA CVX QL NAA+PROBE: NEGATIVE
HPV16 DNA CVX QL NAA+PROBE: NEGATIVE
HPV18 DNA CVX QL NAA+PROBE: NEGATIVE

## 2022-12-09 ENCOUNTER — OFFICE VISIT (OUTPATIENT)
Dept: POSTPARTUM | Facility: CLINIC | Age: 32
End: 2022-12-09

## 2022-12-09 VITALS — HEART RATE: 88 BPM | DIASTOLIC BLOOD PRESSURE: 76 MMHG | SYSTOLIC BLOOD PRESSURE: 112 MMHG

## 2022-12-09 DIAGNOSIS — Z71.89 ENCOUNTER FOR BREAST FEEDING COUNSELING: Primary | ICD-10-CM
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
## 2022-12-09 NOTE — PATIENT INSTRUCTIONS
Continue to feed on demand, at least 8-12 times a day working on achieving an optimal latch by positioning baby with ear, shoulder and  hip in line, baby's arms open, not in between mom and baby, nose to nipple, hand at base of head/neck, infant up at chest level and starting to feed infant with nose arriving at the nipple  Then, using areolar compression to achieve a deep latch that is comfortable and exchanges optimum amounts of milk  When baby slows at the breast you may offer ibis breast compressions to increase flow  Offer both breasts with each feeding  You may offer up to 4 breasts per feeding, or "switch" nursing, latch baby, when suckling slows offer gentle breast compressions, once no longer actively nursing on that breast burp to stimulate, then switch to the other side, repeat up to 2 more times as needed  Continue to offer bottles as desired/needed if not latching to the breast, by paced bottle feeding with slow flow nipple  Continue to pump when Bryan Juares is not feeding at the breast to maintain milk supply, breasts should be pumped or baby latching 8-12 times a day  Monitor Timo' suck blisters and Juliette's nipple pain with latching, follow up if not improving  Follow up with lactation in 2-3 weeks, or as needed  Consider an appt with Dr Mik Leavitt  Call with any questions or concerns

## 2022-12-12 NOTE — PROGRESS NOTES
I have reviewed the notes, assessments, and/or procedures performed by Pearlene Apgar RN, IBCLC, I concur with her/his documentation of Elida Seymour MD 22

## 2022-12-13 LAB
LAB AP GYN PRIMARY INTERPRETATION: NORMAL
Lab: NORMAL

## 2023-01-05 DIAGNOSIS — Z30.011 ENCOUNTER FOR ORAL CONTRACEPTION INITIAL PRESCRIPTION: ICD-10-CM

## 2023-01-05 RX ORDER — ACETAMINOPHEN AND CODEINE PHOSPHATE 120; 12 MG/5ML; MG/5ML
SOLUTION ORAL
Qty: 84 TABLET | Refills: 2 | Status: SHIPPED | OUTPATIENT
Start: 2023-01-05
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
## 2023-03-16 ENCOUNTER — ANNUAL EXAM (OUTPATIENT)
Dept: OBGYN CLINIC | Facility: CLINIC | Age: 33
End: 2023-03-16

## 2023-03-16 VITALS
DIASTOLIC BLOOD PRESSURE: 80 MMHG | HEIGHT: 69 IN | SYSTOLIC BLOOD PRESSURE: 118 MMHG | BODY MASS INDEX: 27.25 KG/M2 | WEIGHT: 184 LBS

## 2023-03-16 DIAGNOSIS — R53.83 FATIGUE, UNSPECIFIED TYPE: ICD-10-CM

## 2023-03-16 DIAGNOSIS — Z01.419 WOMEN'S ANNUAL ROUTINE GYNECOLOGICAL EXAMINATION: Primary | ICD-10-CM

## 2023-09-24 DIAGNOSIS — Z30.011 ENCOUNTER FOR ORAL CONTRACEPTION INITIAL PRESCRIPTION: ICD-10-CM

## 2023-09-24 RX ORDER — ACETAMINOPHEN AND CODEINE PHOSPHATE 120; 12 MG/5ML; MG/5ML
SOLUTION ORAL
Qty: 84 TABLET | Refills: 2 | Status: SHIPPED | OUTPATIENT
Start: 2023-09-24

## 2025-02-21 ENCOUNTER — TELEPHONE (OUTPATIENT)
Age: 35
End: 2025-02-21

## 2025-02-21 ENCOUNTER — APPOINTMENT (OUTPATIENT)
Dept: LAB | Facility: MEDICAL CENTER | Age: 35
End: 2025-02-21
Payer: COMMERCIAL

## 2025-02-21 DIAGNOSIS — Z34.90 PREGNANCY, UNSPECIFIED GESTATIONAL AGE: ICD-10-CM

## 2025-02-21 DIAGNOSIS — Z34.90 PREGNANCY, UNSPECIFIED GESTATIONAL AGE: Primary | ICD-10-CM

## 2025-02-21 LAB — B-HCG SERPL-ACNC: 79.9 MIU/ML (ref 0–5)

## 2025-02-21 PROCEDURE — 84702 CHORIONIC GONADOTROPIN TEST: CPT

## 2025-02-21 PROCEDURE — 36415 COLL VENOUS BLD VENIPUNCTURE: CPT

## 2025-02-21 PROCEDURE — 84144 ASSAY OF PROGESTERONE: CPT

## 2025-02-21 NOTE — TELEPHONE ENCOUNTER
PT called in regarding positive pregnancy test. Scheduled dating and viability for 3/25/25 patients LMP 1/28/25. Patient is requesting labs to check HCG prior to appointment due to HX of losses. Please follow up.

## 2025-02-21 NOTE — TELEPHONE ENCOUNTER
Patient returned call . Advised per Dr. Madrid recommend hcg and progesterone level now followed by hcg in 48 hours. She verbalized understanding. Orders placed at this time.

## 2025-02-22 LAB — PROGEST SERPL-MCNC: 39.25 NG/ML

## 2025-02-23 ENCOUNTER — APPOINTMENT (OUTPATIENT)
Dept: LAB | Facility: CLINIC | Age: 35
End: 2025-02-23
Payer: COMMERCIAL

## 2025-02-23 DIAGNOSIS — Z34.90 PREGNANCY, UNSPECIFIED GESTATIONAL AGE: ICD-10-CM

## 2025-02-23 LAB — B-HCG SERPL-ACNC: 172.6 MIU/ML (ref 0–5)

## 2025-02-23 PROCEDURE — 36415 COLL VENOUS BLD VENIPUNCTURE: CPT

## 2025-02-23 PROCEDURE — 84702 CHORIONIC GONADOTROPIN TEST: CPT

## 2025-03-20 NOTE — PROGRESS NOTES
Assessment & Plan  Amenorrhea    Orders:    AMB US OB < 14 weeks single or first gestation level 1    8 weeks gestation of pregnancy  Viable IUP at  8w0d - MIGUEL established to 25 based on LMP  Encouraged the flu vaccine and COVID booster in pregnancy; Encouraged infection prevention/handwashing. Declines flu vac  Referral placed for MFM to schedule early anatomy scan and review options for genetic screening  RTO in 2 weeks for OB INTAKE with OB nurse navigator  RTO in 4 weeks for INITIAL PRENATAL - routine ob check with physical exam or sooner if needed  Orders:    Ambulatory Referral to Maternal Fetal Medicine; Future      Subjective     HPI  34 y.o.  who presents with amenorrhea. LMP is an exact date of 25. This makes her 8w0d corresponding to an MIGUEL of 25.   Periods are usually reg q month.   This is a planned and welcomed pregnancy.  (+) n - all the time; (+) cramping - mild/occasional; (+) edema - fingers; Denies v/HA/vb/lof/dv/smoking; urine neg/neg  PNVs + DHA - tolerating daily; r/vilma 1 mg folic acid and DHA daily    TV us done - single viable IUP measuring 17.5 mm by CRL at 8w1d; (+) FHM of 169 bpm;  Assigning a Final MIGUEL  Please choose how you are assigning the MIGUEL: The gestational age by LMP is </= 8w 6d and demonstrates 5 or fewer days difference from the gestational age by CRL, therefore the final MIGUEL will be based on the LMP    Final MIGUEL: 25 by LMP.        Ultrasound Probe Disinfection    A transvaginal ultrasound was performed.   Probe identification: probe serial number [facility]: [id number]  Disinfection process: Disinfection was performed with High Level Disinfection Process (Trophon)    Raad Babin PA-C  25  9:06 AM    OB History    Para Term  AB Living   3 1 1  1 1   SAB IAB Ectopic Multiple Live Births   1   0 1      # Outcome Date GA Lbr Asher/2nd Weight Sex Type Anes PTL Lv   3 Current            2 Term 22 37w4d  3015 g (6 lb 10.4 oz)  "F CS-LTranv Spinal  ARSENIO   1 SAB 21 11w0d             Obstetric Comments   : 2021 SAB 11w naturally; suction D&E for retained POCs    vanishing twin; not immune Hep B and rubella; 2022 prim LTCS F 37w    CF/SMA neg       Past Medical History:   Diagnosis Date    Abnormal weight gain     last assessed: 2016    Anxiety     last assessed: 2016    Depression     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     Amoxicillin Rash     Other reaction(s): AMOXICILLIN TRIHYDRATE (AMOXICILLIN)    Gluten Meal - Food Allergy GI Intolerance       Objective   Vitals:    25 0800   BP: 130/78   BP Location: Left arm   Cuff Size: Standard   Weight: 82.6 kg (182 lb)   Height: 5' 9\" (1.753 m)     Physical Exam  Vitals reviewed.   Constitutional:       General: She is not in acute distress.     Appearance: Normal appearance. She is not ill-appearing, toxic-appearing or diaphoretic.   HENT:      Head: Normocephalic and atraumatic.   Eyes:      Conjunctiva/sclera: Conjunctivae normal.   Neurological:      Mental Status: She is alert and oriented to person, place, and time.   Psychiatric:         Mood and Affect: Mood normal.         Behavior: Behavior normal.         Thought Content: Thought content normal.         Judgment: Judgment normal.         "

## 2025-03-25 ENCOUNTER — ULTRASOUND (OUTPATIENT)
Dept: OBGYN CLINIC | Facility: CLINIC | Age: 35
End: 2025-03-25
Payer: COMMERCIAL

## 2025-03-25 VITALS
SYSTOLIC BLOOD PRESSURE: 130 MMHG | HEIGHT: 69 IN | DIASTOLIC BLOOD PRESSURE: 78 MMHG | BODY MASS INDEX: 26.96 KG/M2 | WEIGHT: 182 LBS

## 2025-03-25 DIAGNOSIS — N91.2 AMENORRHEA: Primary | ICD-10-CM

## 2025-03-25 DIAGNOSIS — Z3A.08 8 WEEKS GESTATION OF PREGNANCY: ICD-10-CM

## 2025-03-25 PROBLEM — O31.10X0 VANISHING TWIN SYNDROME: Status: RESOLVED | Noted: 2022-10-11 | Resolved: 2025-03-25

## 2025-03-25 PROBLEM — O31.10X0 VANISHING TWIN SYNDROME: Status: RESOLVED | Noted: 2022-05-24 | Resolved: 2025-03-25

## 2025-03-25 PROBLEM — O28.0 ABNORMAL MSAFP (MATERNAL SERUM ALPHA-FETOPROTEIN), ELEVATED: Status: RESOLVED | Noted: 2022-07-19 | Resolved: 2025-03-25

## 2025-03-25 PROBLEM — R03.0 ELEVATED BLOOD PRESSURE READING IN OFFICE WITHOUT DIAGNOSIS OF HYPERTENSION: Status: RESOLVED | Noted: 2022-11-02 | Resolved: 2025-03-25

## 2025-03-25 PROBLEM — Z98.891 S/P CESAREAN SECTION: Status: RESOLVED | Noted: 2022-09-09 | Resolved: 2025-03-25

## 2025-03-25 PROBLEM — Z30.011 ENCOUNTER FOR ORAL CONTRACEPTION INITIAL PRESCRIPTION: Status: RESOLVED | Noted: 2022-12-07 | Resolved: 2025-03-25

## 2025-03-25 PROBLEM — O36.8190 DECREASED FETAL MOVEMENT: Status: RESOLVED | Noted: 2022-11-14 | Resolved: 2025-03-25

## 2025-03-25 PROBLEM — O99.013 ANEMIA DURING PREGNANCY IN THIRD TRIMESTER: Status: RESOLVED | Noted: 2022-10-20 | Resolved: 2025-03-25

## 2025-03-25 PROCEDURE — 76817 TRANSVAGINAL US OBSTETRIC: CPT | Performed by: PHYSICIAN ASSISTANT

## 2025-03-25 PROCEDURE — 99214 OFFICE O/P EST MOD 30 MIN: CPT | Performed by: PHYSICIAN ASSISTANT

## 2025-04-01 ENCOUNTER — NURSE TRIAGE (OUTPATIENT)
Age: 35
End: 2025-04-01

## 2025-04-01 NOTE — TELEPHONE ENCOUNTER
"FOLLOW UP: ESC to on call provider Dr. Madrid. Agrees with recommendations. Patient to call back for persistent or worsening symptoms. Patient verbalzies understanding.    REASON FOR CONVERSATION: Pelvic Pain - Pregnant    SYMPTOMS: Patient is 9w0d  calling to report 6/10 right pelvic cramping that radiates to her thigh for the last hour. Nothing seems to make it better or worse. Pain is mostly constant. Denies vaginal bleeding, fever, vomiting, and diarrhea. She is tearful, reports history of SAB.    OTHER: Advised tylenol, rest, hydrate, heating pad    DISPOSITION: Discuss with Provider and Call Back Patient      Answer Assessment - Initial Assessment Questions  1. LOCATION: \"Where does it hurt?\"       Pelvic - right  2. RADIATION: \"Does the pain shoot anywhere else?\" (e.g., chest, back, shoulder)      To thigh  3. ONSET: \"When did the pain begin?\" (e.g., minutes, hours or days ago)       This morning around 645  4. ONSET: \"Gradual or sudden onset?\"      sudden  5. PATTERN \"Does the pain come and go, or has it been constant since it started?\"       Constant periods of relief  6. SEVERITY: \"How bad is the pain?\" \"What does it keep you from doing?\"  (e.g., Scale 1-10; mild, moderate, or severe)      6/10  7. RECURRENT SYMPTOM: \"Have you ever had this type of stomach pain before?\" If Yes, ask: \"When was the last time?\" and \"What happened that time?\"       denies  8. CAUSE: \"What do you think is causing the stomach pain?\"      unsure  9. RELIEVING/AGGRAVATING FACTORS: \"What makes it better or worse?\" (e.g., antacids, bowel movement, movement)      Denies  10. OTHER SYMPTOMS: \"Do you have any other symptoms?\" (e.g., back pain, diarrhea, fever, urination pain, vaginal bleeding, vaginal discharge, vomiting)        Denies fever, urinary concerns,vaginal bleeding, vomiting, diarrhea  11. MIGUEL: \"What date are you expecting to deliver?\"        2025 9w0d    Protocols used: Pregnancy - Abdominal Pain Less Than 20 " Weeks EGA-Adult-[place]

## (undated) DEVICE — SUT MONOCRYL 0 CTX 36 IN Y398H

## (undated) DEVICE — SURGICEL NU-KNIT 3 X 4

## (undated) DEVICE — PACK C-SECTION PBDS

## (undated) DEVICE — CHLORAPREP HI-LITE 26ML ORANGE

## (undated) DEVICE — ADHESIVE SKIN HIGH VISCOSITY EXOFIN 1ML

## (undated) DEVICE — TELFA NON-ADHERENT ABSORBENT DRESSING: Brand: TELFA

## (undated) DEVICE — ABDOMINAL PAD: Brand: DERMACEA

## (undated) DEVICE — SKIN MARKER DUAL TIP WITH RULER CAP, FLEXIBLE RULER AND LABELS: Brand: DEVON

## (undated) DEVICE — GLOVE INDICATOR PI UNDERGLOVE SZ 7 BLUE

## (undated) DEVICE — Device

## (undated) DEVICE — GLOVE PI ULTRA TOUCH SZ.6.5

## (undated) DEVICE — GAUZE SPONGES,16 PLY: Brand: CURITY

## (undated) DEVICE — SUT VICRYL 0 CT-1 36 IN J946H

## (undated) DEVICE — SUT VICRYL 2-0 CT-1 27 IN J259H